# Patient Record
Sex: FEMALE | Race: WHITE | Employment: FULL TIME | URBAN - METROPOLITAN AREA
[De-identification: names, ages, dates, MRNs, and addresses within clinical notes are randomized per-mention and may not be internally consistent; named-entity substitution may affect disease eponyms.]

---

## 2017-06-06 ENCOUNTER — OFFICE VISIT (OUTPATIENT)
Dept: INTERNAL MEDICINE CLINIC | Facility: CLINIC | Age: 42
End: 2017-06-06

## 2017-06-06 VITALS
HEART RATE: 100 BPM | SYSTOLIC BLOOD PRESSURE: 144 MMHG | DIASTOLIC BLOOD PRESSURE: 98 MMHG | BODY MASS INDEX: 44.3 KG/M2 | OXYGEN SATURATION: 98 % | HEIGHT: 63 IN | WEIGHT: 250 LBS

## 2017-06-06 DIAGNOSIS — Z00.00 ANNUAL PHYSICAL EXAM: Primary | ICD-10-CM

## 2017-06-06 DIAGNOSIS — Z72.0 TOBACCO ABUSE: ICD-10-CM

## 2017-06-06 DIAGNOSIS — E66.01 MORBID OBESITY, UNSPECIFIED OBESITY TYPE (HCC): ICD-10-CM

## 2017-06-06 DIAGNOSIS — I10 ESSENTIAL HYPERTENSION: ICD-10-CM

## 2017-06-06 DIAGNOSIS — L83 ACANTHOSIS NIGRICANS, ACQUIRED: ICD-10-CM

## 2017-06-06 PROCEDURE — 99386 PREV VISIT NEW AGE 40-64: CPT | Performed by: FAMILY MEDICINE

## 2017-06-06 RX ORDER — LISINOPRIL AND HYDROCHLOROTHIAZIDE 25; 20 MG/1; MG/1
1 TABLET ORAL DAILY
Qty: 90 TABLET | Refills: 3 | Status: SHIPPED | OUTPATIENT
Start: 2017-06-06 | End: 2017-10-06

## 2017-06-06 RX ORDER — BUPROPION HYDROCHLORIDE 100 MG/1
100 TABLET, EXTENDED RELEASE ORAL DAILY
Qty: 90 TABLET | Refills: 1 | Status: ON HOLD | OUTPATIENT
Start: 2017-06-06 | End: 2018-01-29

## 2017-06-06 NOTE — PROGRESS NOTES
HPI:   Ulisses Burton is a 40year old female who presents for a complete physical exam.   Last pap:  2015-->NORMAL  Last mammogram:  10/16  Menses:  NONE (ON MIRENA IUD)  Family hx of breast, ovarian, cervical or colon CA:  NO    PMHX:  HTN  SOCIAL:  MAR developed, OBESE,in no apparent distress  SKIN: no rashes,no suspicious lesions  HEENT: atraumatic, normocephalic,throat are clear; dentition good  EYES:PERRLA, EOMI,conjunctiva are clear  NECK: supple,no adenopathy  LUNGS: clear to auscultation  CARDIO: R

## 2017-06-30 ENCOUNTER — NURSE ONLY (OUTPATIENT)
Dept: INTERNAL MEDICINE CLINIC | Facility: CLINIC | Age: 42
End: 2017-06-30

## 2017-06-30 PROCEDURE — 80053 COMPREHEN METABOLIC PANEL: CPT | Performed by: FAMILY MEDICINE

## 2017-06-30 PROCEDURE — 36415 COLL VENOUS BLD VENIPUNCTURE: CPT | Performed by: FAMILY MEDICINE

## 2017-06-30 PROCEDURE — 80061 LIPID PANEL: CPT | Performed by: FAMILY MEDICINE

## 2017-06-30 PROCEDURE — 84443 ASSAY THYROID STIM HORMONE: CPT | Performed by: FAMILY MEDICINE

## 2017-07-08 RX ORDER — HYDROCHLOROTHIAZIDE 25 MG/1
TABLET ORAL
Qty: 30 TABLET | Refills: 5 | OUTPATIENT
Start: 2017-07-08

## 2017-07-08 NOTE — TELEPHONE ENCOUNTER
Lisinopril-Hydrochlorothiazide 20-25 MG Oral Tab 90 tablet 3 6/6/2017     Sig - Route:  Take 1 tablet by mouth daily. - Oral    E-Prescribing Status: Receipt confirmed by pharmacy (6/6/2017  2:43 PM CDT)    Print Trail   Printed On Printed By Printed To Rep

## 2017-07-10 ENCOUNTER — OFFICE VISIT (OUTPATIENT)
Dept: INTERNAL MEDICINE CLINIC | Facility: CLINIC | Age: 42
End: 2017-07-10

## 2017-07-10 VITALS
TEMPERATURE: 99 F | BODY MASS INDEX: 44.3 KG/M2 | HEART RATE: 103 BPM | OXYGEN SATURATION: 98 % | WEIGHT: 250 LBS | HEIGHT: 63 IN | SYSTOLIC BLOOD PRESSURE: 128 MMHG | DIASTOLIC BLOOD PRESSURE: 94 MMHG
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
## 2017-07-10 PROCEDURE — 99212 OFFICE O/P EST SF 10 MIN: CPT | Performed by: FAMILY MEDICINE

## 2017-07-10 RX ORDER — DOXYCYCLINE HYCLATE 100 MG/1
100 CAPSULE ORAL 2 TIMES DAILY
Qty: 10 CAPSULE | Refills: 0 | Status: ON HOLD | OUTPATIENT
Start: 2017-07-10 | End: 2018-01-29

## 2017-07-10 NOTE — PROGRESS NOTES
CC:  Bite Sting,Insect (integumentary) (Pt presents to clinic for c/o possible bug bite on upper back by left shoulder-->causing redness and itchiness.  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

## 2017-10-05 RX ORDER — LISINOPRIL AND HYDROCHLOROTHIAZIDE 25; 20 MG/1; MG/1
1 TABLET ORAL DAILY
Qty: 90 TABLET | Refills: 3 | Status: CANCELLED
Start: 2017-10-05

## 2017-10-06 DIAGNOSIS — I10 ESSENTIAL HYPERTENSION: Primary | ICD-10-CM

## 2017-10-06 RX ORDER — LISINOPRIL AND HYDROCHLOROTHIAZIDE 25; 20 MG/1; MG/1
1 TABLET ORAL DAILY
Qty: 90 TABLET | Refills: 2 | Status: SHIPPED | OUTPATIENT
Start: 2017-10-06 | End: 2018-01-30

## 2018-01-29 ENCOUNTER — APPOINTMENT (OUTPATIENT)
Dept: CT IMAGING | Facility: HOSPITAL | Age: 43
DRG: 394 | End: 2018-01-29
Attending: EMERGENCY MEDICINE
Payer: COMMERCIAL

## 2018-01-29 ENCOUNTER — HOSPITAL ENCOUNTER (INPATIENT)
Facility: HOSPITAL | Age: 43
LOS: 1 days | Discharge: HOME | DRG: 394 | End: 2018-01-30
Attending: EMERGENCY MEDICINE | Admitting: HOSPITALIST
Payer: COMMERCIAL

## 2018-01-29 DIAGNOSIS — K52.9 COLITIS: Primary | ICD-10-CM

## 2018-01-29 DIAGNOSIS — K63.5 POLYP OF ASCENDING COLON: ICD-10-CM

## 2018-01-29 DIAGNOSIS — K63.5 POLYP OF SIGMOID COLON: ICD-10-CM

## 2018-01-29 LAB
ADENOVIRUS F 40/41 PCR: NEGATIVE
ANION GAP SERPL CALC-SCNC: 13 MMOL/L (ref 0–18)
ASTROVIRUS PCR: NEGATIVE
BASOPHILS # BLD: 0 K/UL (ref 0–0.2)
BASOPHILS NFR BLD: 0 %
BILIRUB UR QL: NEGATIVE
BUN SERPL-MCNC: 18 MG/DL (ref 8–20)
BUN/CREAT SERPL: 22.5 (ref 10–20)
C CAYETANENSIS DNA SPEC QL NAA+PROBE: NEGATIVE
C. DIFFICILE TOXIN A/B PCR: NEGATIVE
CALCIUM SERPL-MCNC: 10.9 MG/DL (ref 8.5–10.5)
CAMPY SP DNA.DIARRHEA STL QL NAA+PROBE: NEGATIVE
CHLORIDE SERPL-SCNC: 99 MMOL/L (ref 95–110)
CO2 SERPL-SCNC: 21 MMOL/L (ref 22–32)
COLOR UR: YELLOW
CREAT SERPL-MCNC: 0.8 MG/DL (ref 0.5–1.5)
CRYPTOSP DNA SPEC QL NAA+PROBE: NEGATIVE
EAEC PAA PLAS AGGR+AATA ST NAA+NON-PRB: NEGATIVE
EC STX1+STX2 + H7 FLIC SPEC NAA+PROBE: NEGATIVE
ENTAMOEBA HISTOLYTICA PCR: NEGATIVE
EOSINOPHIL # BLD: 0.1 K/UL (ref 0–0.7)
EOSINOPHIL NFR BLD: 0 %
EPEC EAE GENE STL QL NAA+NON-PROBE: NEGATIVE
ERYTHROCYTE [DISTWIDTH] IN BLOOD BY AUTOMATED COUNT: 13.6 % (ref 11–15)
ETEC LTA+ST1A+ST1B TOX ST NAA+NON-PROBE: NEGATIVE
GIARDIA LAMBLIA PCR: NEGATIVE
GLUCOSE SERPL-MCNC: 110 MG/DL (ref 70–99)
GLUCOSE UR-MCNC: NEGATIVE MG/DL
HCT VFR BLD AUTO: 45 % (ref 35–48)
HGB BLD-MCNC: 14.8 G/DL (ref 12–16)
KETONES UR-MCNC: NEGATIVE MG/DL
LYMPHOCYTES # BLD: 2.3 K/UL (ref 1–4)
LYMPHOCYTES NFR BLD: 15 %
MCH RBC QN AUTO: 29 PG (ref 27–32)
MCHC RBC AUTO-ENTMCNC: 32.9 G/DL (ref 32–37)
MCV RBC AUTO: 88.1 FL (ref 80–100)
MONOCYTES # BLD: 0.9 K/UL (ref 0–1)
MONOCYTES NFR BLD: 6 %
NEUTROPHILS # BLD AUTO: 11.6 K/UL (ref 1.8–7.7)
NEUTROPHILS NFR BLD: 78 %
NITRITE UR QL STRIP.AUTO: NEGATIVE
NOROVIRUS GI/GII PCR: NEGATIVE
OSMOLALITY UR CALC.SUM OF ELEC: 279 MOSM/KG (ref 275–295)
P SHIGELLOIDES DNA STL QL NAA+PROBE: NEGATIVE
PH UR: 5 [PH] (ref 5–8)
PLATELET # BLD AUTO: 354 K/UL (ref 140–400)
PMV BLD AUTO: 8.6 FL (ref 7.4–10.3)
POTASSIUM SERPL-SCNC: 3.9 MMOL/L (ref 3.3–5.1)
PROT UR-MCNC: NEGATIVE MG/DL
RBC # BLD AUTO: 5.11 M/UL (ref 3.7–5.4)
RBC #/AREA URNS AUTO: 5 /HPF
ROTAVIRUS A PCR: NEGATIVE
SALMONELLA DNA SPEC QL NAA+PROBE: NEGATIVE
SAPOVIRUS PCR: NEGATIVE
SHIGELLA SP+EIEC IPAH ST NAA+NON-PROBE: NEGATIVE
SODIUM SERPL-SCNC: 133 MMOL/L (ref 136–144)
SP GR UR STRIP: 1.01 (ref 1–1.03)
UROBILINOGEN UR STRIP-ACNC: <2
V CHOLERAE DNA SPEC QL NAA+PROBE: NEGATIVE
VIBRIO DNA SPEC NAA+PROBE: NEGATIVE
VIT C UR-MCNC: NEGATIVE MG/DL
WBC # BLD AUTO: 14.8 K/UL (ref 4–11)
WBC #/AREA URNS AUTO: 6 /HPF
YERSINIA DNA SPEC NAA+PROBE: NEGATIVE

## 2018-01-29 PROCEDURE — 99222 1ST HOSP IP/OBS MODERATE 55: CPT | Performed by: HOSPITALIST

## 2018-01-29 PROCEDURE — 74177 CT ABD & PELVIS W/CONTRAST: CPT | Performed by: EMERGENCY MEDICINE

## 2018-01-29 PROCEDURE — 99254 IP/OBS CNSLTJ NEW/EST MOD 60: CPT | Performed by: INTERNAL MEDICINE

## 2018-01-29 RX ORDER — ACETAMINOPHEN 325 MG/1
650 TABLET ORAL EVERY 6 HOURS PRN
Status: DISCONTINUED | OUTPATIENT
Start: 2018-01-29 | End: 2018-01-30

## 2018-01-29 RX ORDER — SODIUM CHLORIDE 0.9 % (FLUSH) 0.9 %
3 SYRINGE (ML) INJECTION AS NEEDED
Status: DISCONTINUED | OUTPATIENT
Start: 2018-01-29 | End: 2018-01-30

## 2018-01-29 RX ORDER — BISACODYL 10 MG
10 SUPPOSITORY, RECTAL RECTAL
Status: DISCONTINUED | OUTPATIENT
Start: 2018-01-29 | End: 2018-01-30

## 2018-01-29 RX ORDER — LISINOPRIL AND HYDROCHLOROTHIAZIDE 25; 20 MG/1; MG/1
1 TABLET ORAL DAILY
Status: DISCONTINUED | OUTPATIENT
Start: 2018-01-29 | End: 2018-01-29 | Stop reason: RX

## 2018-01-29 RX ORDER — ZOLPIDEM TARTRATE 5 MG/1
5 TABLET ORAL NIGHTLY PRN
Status: DISCONTINUED | OUTPATIENT
Start: 2018-01-29 | End: 2018-01-30

## 2018-01-29 RX ORDER — ONDANSETRON 2 MG/ML
4 INJECTION INTRAMUSCULAR; INTRAVENOUS ONCE
Status: COMPLETED | OUTPATIENT
Start: 2018-01-29 | End: 2018-01-29

## 2018-01-29 RX ORDER — HYDROCODONE BITARTRATE AND ACETAMINOPHEN 5; 325 MG/1; MG/1
2 TABLET ORAL EVERY 4 HOURS PRN
Status: DISCONTINUED | OUTPATIENT
Start: 2018-01-29 | End: 2018-01-30

## 2018-01-29 RX ORDER — ONDANSETRON 2 MG/ML
4 INJECTION INTRAMUSCULAR; INTRAVENOUS EVERY 6 HOURS PRN
Status: DISCONTINUED | OUTPATIENT
Start: 2018-01-29 | End: 2018-01-30

## 2018-01-29 RX ORDER — POLYETHYLENE GLYCOL 3350 17 G/17G
17 POWDER, FOR SOLUTION ORAL DAILY PRN
Status: DISCONTINUED | OUTPATIENT
Start: 2018-01-29 | End: 2018-01-30

## 2018-01-29 RX ORDER — MORPHINE SULFATE 2 MG/ML
1 INJECTION, SOLUTION INTRAMUSCULAR; INTRAVENOUS EVERY 2 HOUR PRN
Status: DISCONTINUED | OUTPATIENT
Start: 2018-01-29 | End: 2018-01-30

## 2018-01-29 RX ORDER — SODIUM CHLORIDE 9 MG/ML
INJECTION, SOLUTION INTRAVENOUS CONTINUOUS
Status: DISCONTINUED | OUTPATIENT
Start: 2018-01-29 | End: 2018-01-30

## 2018-01-29 RX ORDER — ACETAMINOPHEN 325 MG/1
650 TABLET ORAL EVERY 4 HOURS PRN
Status: DISCONTINUED | OUTPATIENT
Start: 2018-01-29 | End: 2018-01-30

## 2018-01-29 RX ORDER — MORPHINE SULFATE 4 MG/ML
4 INJECTION, SOLUTION INTRAMUSCULAR; INTRAVENOUS EVERY 2 HOUR PRN
Status: DISCONTINUED | OUTPATIENT
Start: 2018-01-29 | End: 2018-01-30

## 2018-01-29 RX ORDER — HEPARIN SODIUM 5000 [USP'U]/ML
5000 INJECTION, SOLUTION INTRAVENOUS; SUBCUTANEOUS EVERY 12 HOURS SCHEDULED
Status: DISCONTINUED | OUTPATIENT
Start: 2018-01-29 | End: 2018-01-30

## 2018-01-29 RX ORDER — BUPROPION HYDROCHLORIDE 100 MG/1
100 TABLET, EXTENDED RELEASE ORAL DAILY
Status: DISCONTINUED | OUTPATIENT
Start: 2018-01-29 | End: 2018-01-29

## 2018-01-29 RX ORDER — DOCUSATE SODIUM 100 MG/1
100 CAPSULE, LIQUID FILLED ORAL 2 TIMES DAILY PRN
Status: DISCONTINUED | OUTPATIENT
Start: 2018-01-29 | End: 2018-01-30

## 2018-01-29 RX ORDER — MORPHINE SULFATE 2 MG/ML
2 INJECTION, SOLUTION INTRAMUSCULAR; INTRAVENOUS EVERY 2 HOUR PRN
Status: DISCONTINUED | OUTPATIENT
Start: 2018-01-29 | End: 2018-01-30

## 2018-01-29 RX ORDER — HYDROCODONE BITARTRATE AND ACETAMINOPHEN 5; 325 MG/1; MG/1
1 TABLET ORAL EVERY 4 HOURS PRN
Status: DISCONTINUED | OUTPATIENT
Start: 2018-01-29 | End: 2018-01-30

## 2018-01-29 NOTE — CONSULTS
[facility]   Gastroenterology Consultation Note    Kvng Galvez  Patient Status:    Emergency  Date of Admission:         1/29/2018, Hospital day #0  Attending:   No att. providers found  PCP:     Danielle De Souza MD    Reason for [facility] ill. She endorses episodes of cramping previously, however no blood in her BMs. No hematemesis, dysphagia, odynophagia, heartburn/dyspepsia or epigastric pain. The lower abdominal cramping did radiate to the flanks yesterday.  Endorses feeling warm, no feve Systems:  CONSTITUTIONAL:  +subjective fevers yesterday   EYES:  negative for diplopia or change in vision   RESPIRATORY:  negative for severe shortness of breath  CARDIOVASCULAR:  negative for crushing sub-sternal chest pain  GASTROINTESTINAL:  see HPI  G nonspecific colitis of infectious or inflammatory etiology. 2.  Post cholecystectomy. No biliary ductal dilatation. 3.  13 x 16 mm left adrenal nodule, which could represent an adenoma however it is not well characterized due to imaging technique.  4.  Sat Colonoscopy consent: I have discussed the risks, benefits, and alternatives to colonoscopy with the patient [who demonstrated understanding], including but not limited to the risks of bleeding, infection, pain, death, as well as the risks of anesthesia discontinue the antihypertensive medication.

## 2018-01-29 NOTE — H&P
[facility]    PATIENT'S NAME: Georgie Lozano   ATTENDING PHYSICIAN: Patel Bullock MD   PATIENT ACCOUNT#:   [de-identified]    LOCATION:  [place] [address] #:   [id number]       YOB: 1975  ADMISSION DATE:       01/29/2018 HISTORY:  Father is 77 and has MS. Mother is 58 and well. She has a sister who had Hodgkin lymphoma at age 25. Maternal grandmother who had a breast cancer at age 48. SOCIAL HISTORY:  She smokes half-pack a day and has a 10-pack-year smoking history. creatinine 0.8. Abdomen and pelvis CT shows descending colon, thickening colitis. Await scope and other recommendations. ASSESSMENT AND PLAN:    1. Biliary duct dilatation. 2.   Adrenal nodule, may need to be rechecked.   3.   Implanted intrauteri

## 2018-01-29 NOTE — ED PROVIDER NOTES
Patient Seen in: [facility] AND [facility] Emergency Department    History   Patient presents with:  Rectal Bleeding    Stated Complaint: rectal bleed    HPI    18-year-old female with history of hypertension and IBS presents with complaints of abdominal pain an injection  ENT, Mouth: mucous membranes moist  Respiratory: there are no retractions, lungs are clear to auscultation  Cardiovascular: regular rate and rhythm  Gastrointestinal:  abdomen is soft with some tenderness to the epigastric area and left lower qu aaron Daniel.      Admission disposition: 1/29/2018 11:10 AM           Disposition and Plan     Clinical Impression:  Colitis  (primary encounter diagnosis)    Disposition:  Admit  1/29/2018 11:10 am    Follow-up:  No follow-up provider specified.

## 2018-01-29 NOTE — ED INITIAL ASSESSMENT (HPI)
Pt reports \"bright red\" bleeding from rectum when attempting to have a bm. Bleeding onset yesterday. Had one episode of diarrhea last night. Also has intermittent lower abd cramping.

## 2018-01-30 ENCOUNTER — ANESTHESIA EVENT (OUTPATIENT)
Dept: ENDOSCOPY | Facility: HOSPITAL | Age: 43
DRG: 394 | End: 2018-01-30
Payer: COMMERCIAL

## 2018-01-30 ENCOUNTER — ANESTHESIA (OUTPATIENT)
Dept: ENDOSCOPY | Facility: HOSPITAL | Age: 43
DRG: 394 | End: 2018-01-30
Payer: COMMERCIAL

## 2018-01-30 ENCOUNTER — SURGERY (OUTPATIENT)
Age: 43
End: 2018-01-30

## 2018-01-30 VITALS
TEMPERATURE: 98 F | SYSTOLIC BLOOD PRESSURE: 125 MMHG | DIASTOLIC BLOOD PRESSURE: 56 MMHG | WEIGHT: 241.63 LBS | BODY MASS INDEX: 42.81 KG/M2 | HEIGHT: 63 IN | OXYGEN SATURATION: 98 % | RESPIRATION RATE: 16 BRPM | HEART RATE: 58 BPM

## 2018-01-30 LAB
ALBUMIN SERPL BCP-MCNC: 3.5 G/DL (ref 3.5–4.8)
ALBUMIN/GLOB SERPL: 1.5 {RATIO} (ref 1–2)
ALP SERPL-CCNC: 68 U/L (ref 32–100)
ALT SERPL-CCNC: 25 U/L (ref 14–54)
ANION GAP SERPL CALC-SCNC: 7 MMOL/L (ref 0–18)
AST SERPL-CCNC: 22 U/L (ref 15–41)
BASOPHILS # BLD: 0 K/UL (ref 0–0.2)
BASOPHILS NFR BLD: 0 %
BILIRUB SERPL-MCNC: 0.9 MG/DL (ref 0.3–1.2)
BUN SERPL-MCNC: 11 MG/DL (ref 8–20)
BUN/CREAT SERPL: 16.2 (ref 10–20)
CALCIUM SERPL-MCNC: 9.3 MG/DL (ref 8.5–10.5)
CHLORIDE SERPL-SCNC: 105 MMOL/L (ref 95–110)
CHOLEST SERPL-MCNC: 118 MG/DL (ref 110–200)
CO2 SERPL-SCNC: 23 MMOL/L (ref 22–32)
CREAT SERPL-MCNC: 0.68 MG/DL (ref 0.5–1.5)
EOSINOPHIL # BLD: 0.1 K/UL (ref 0–0.7)
EOSINOPHIL NFR BLD: 1 %
ERYTHROCYTE [DISTWIDTH] IN BLOOD BY AUTOMATED COUNT: 14 % (ref 11–15)
GLOBULIN PLAS-MCNC: 2.3 G/DL (ref 2.5–3.7)
GLUCOSE SERPL-MCNC: 87 MG/DL (ref 70–99)
HCT VFR BLD AUTO: 39.5 % (ref 35–48)
HDLC SERPL-MCNC: 27 MG/DL
HGB BLD-MCNC: 13.3 G/DL (ref 12–16)
LDLC SERPL CALC-MCNC: 67 MG/DL (ref 0–99)
LYMPHOCYTES # BLD: 3.8 K/UL (ref 1–4)
LYMPHOCYTES NFR BLD: 35 %
MAGNESIUM SERPL-MCNC: 1.7 MG/DL (ref 1.8–2.5)
MCH RBC QN AUTO: 29.5 PG (ref 27–32)
MCHC RBC AUTO-ENTMCNC: 33.6 G/DL (ref 32–37)
MCV RBC AUTO: 87.8 FL (ref 80–100)
MONOCYTES # BLD: 0.7 K/UL (ref 0–1)
MONOCYTES NFR BLD: 6 %
NEUTROPHILS # BLD AUTO: 6.3 K/UL (ref 1.8–7.7)
NEUTROPHILS NFR BLD: 57 %
NONHDLC SERPL-MCNC: 91 MG/DL
OSMOLALITY UR CALC.SUM OF ELEC: 279 MOSM/KG (ref 275–295)
PHOSPHATE SERPL-MCNC: 2.7 MG/DL (ref 2.4–4.7)
PLATELET # BLD AUTO: 272 K/UL (ref 140–400)
PMV BLD AUTO: 8.4 FL (ref 7.4–10.3)
POTASSIUM SERPL-SCNC: 3.2 MMOL/L (ref 3.3–5.1)
PROT SERPL-MCNC: 5.8 G/DL (ref 5.9–8.4)
RBC # BLD AUTO: 4.5 M/UL (ref 3.7–5.4)
SODIUM SERPL-SCNC: 135 MMOL/L (ref 136–144)
TRIGL SERPL-MCNC: 121 MG/DL (ref 1–149)
TSH SERPL-ACNC: 2.08 UIU/ML (ref 0.45–5.33)
WBC # BLD AUTO: 10.9 K/UL (ref 4–11)

## 2018-01-30 PROCEDURE — 0DBN8ZZ EXCISION OF SIGMOID COLON, VIA NATURAL OR ARTIFICIAL OPENING ENDOSCOPIC: ICD-10-PCS | Performed by: INTERNAL MEDICINE

## 2018-01-30 PROCEDURE — 0DBM8ZX EXCISION OF DESCENDING COLON, VIA NATURAL OR ARTIFICIAL OPENING ENDOSCOPIC, DIAGNOSTIC: ICD-10-PCS | Performed by: INTERNAL MEDICINE

## 2018-01-30 PROCEDURE — 99239 HOSP IP/OBS DSCHRG MGMT >30: CPT | Performed by: HOSPITALIST

## 2018-01-30 PROCEDURE — 0DBK8ZZ EXCISION OF ASCENDING COLON, VIA NATURAL OR ARTIFICIAL OPENING ENDOSCOPIC: ICD-10-PCS | Performed by: INTERNAL MEDICINE

## 2018-01-30 PROCEDURE — 45385 COLONOSCOPY W/LESION REMOVAL: CPT | Performed by: INTERNAL MEDICINE

## 2018-01-30 RX ORDER — NALOXONE HYDROCHLORIDE 0.4 MG/ML
80 INJECTION, SOLUTION INTRAMUSCULAR; INTRAVENOUS; SUBCUTANEOUS AS NEEDED
Status: DISCONTINUED | OUTPATIENT
Start: 2018-01-30 | End: 2018-01-30 | Stop reason: HOSPADM

## 2018-01-30 RX ORDER — LIDOCAINE HYDROCHLORIDE 10 MG/ML
INJECTION, SOLUTION EPIDURAL; INFILTRATION; INTRACAUDAL; PERINEURAL AS NEEDED
Status: DISCONTINUED | OUTPATIENT
Start: 2018-01-30 | End: 2018-01-30 | Stop reason: SURG

## 2018-01-30 RX ORDER — SODIUM CHLORIDE, SODIUM LACTATE, POTASSIUM CHLORIDE, CALCIUM CHLORIDE 600; 310; 30; 20 MG/100ML; MG/100ML; MG/100ML; MG/100ML
INJECTION, SOLUTION INTRAVENOUS CONTINUOUS
Status: DISCONTINUED | OUTPATIENT
Start: 2018-01-30 | End: 2018-01-30

## 2018-01-30 RX ORDER — MIDAZOLAM HYDROCHLORIDE 1 MG/ML
INJECTION INTRAMUSCULAR; INTRAVENOUS AS NEEDED
Status: DISCONTINUED | OUTPATIENT
Start: 2018-01-30 | End: 2018-01-30 | Stop reason: SURG

## 2018-01-30 RX ORDER — PSEUDOEPHEDRINE HCL 30 MG
100 TABLET ORAL 2 TIMES DAILY PRN
Qty: 20 CAPSULE | Refills: 0 | Status: SHIPPED | OUTPATIENT
Start: 2018-01-30 | End: 2018-02-06

## 2018-01-30 RX ORDER — MAGNESIUM OXIDE 400 MG (241.3 MG MAGNESIUM) TABLET
400 TABLET ONCE
Status: COMPLETED | OUTPATIENT
Start: 2018-01-30 | End: 2018-01-30

## 2018-01-30 RX ORDER — POTASSIUM CHLORIDE 20 MEQ/1
20 TABLET, EXTENDED RELEASE ORAL EVERY 4 HOURS
Status: DISCONTINUED | OUTPATIENT
Start: 2018-01-30 | End: 2018-01-30

## 2018-01-30 RX ADMIN — SODIUM CHLORIDE: 9 INJECTION, SOLUTION INTRAVENOUS at 12:55:00

## 2018-01-30 RX ADMIN — LIDOCAINE HYDROCHLORIDE 50 MG: 10 INJECTION, SOLUTION EPIDURAL; INFILTRATION; INTRACAUDAL; PERINEURAL at 13:03:00

## 2018-01-30 RX ADMIN — SODIUM CHLORIDE: 9 INJECTION, SOLUTION INTRAVENOUS at 13:33:00

## 2018-01-30 RX ADMIN — MIDAZOLAM HYDROCHLORIDE 2 MG: 1 INJECTION INTRAMUSCULAR; INTRAVENOUS at 12:57:00

## 2018-01-30 NOTE — DISCHARGE SUMMARY
More than 30 min spent on dc  Dc summary # [id number]  Hospital Discharge Diagnoses: colitis poss ischemic    Lace+ Score: 16  59-90 High Risk  29-58 Medium Risk  0-28   Low Risk. TCM Follow-Up Recommendation:  LACE 29-58:  Moderate Risk of readmission aft

## 2018-01-30 NOTE — ANESTHESIA PREPROCEDURE EVALUATION
Anesthesia PreOp Note    HPI:     Simba Shen is a 40year old female who presents for preoperative consultation requested by: Sierra Galvan MD    Date of Surgery: 1/29/2018 - 1/30/2018    Procedure(s):  COLONOSCOPY  Indication: Abdominal P (NORCO) 5-325 MG per tab 1 tablet 1 tablet Oral Q4H PRN Delmy Angel MD    Or        HYDROcodone-acetaminophen (NORCO) 5-325 MG per tab 2 tablet 2 tablet Oral Q4H PRN Delmy Angel MD    morphINE sulfate (PF) 2 MG/ML injection 1 mg 1 Narrative   None on file       Available pre-op labs reviewed.     Lab Results  Component Value Date   WBC 10.9 01/30/2018   RBC 4.50 01/30/2018   HGB 13.3 01/30/2018   HCT 39.5 01/30/2018   MCV 87.8 01/30/2018   MCH 29.5 01/30/2018   MCHC 33.6 01/30/2018 management. All of the patient's questions were answered to the best of my ability. The patient desires the anesthetic management as planned.   Pritesh Montiel  1/30/2018 11:45 AM

## 2018-01-30 NOTE — PLAN OF CARE
Patient/Family Goals    • Patient/Family Long Term Goal Completed    • Patient/Family Short Term Goal Completed        Pt will be D/C home today with her father who is at the bedside. Pt's belongings are at the bedside.  Pt was explained all D/C instruction

## 2018-01-30 NOTE — OPERATIVE REPORT
COLONOSCOPY PROCEDURE REPORT     DATE OF PROCEDURE:  1/30/2018     PCP: Dari Jenkins MD     PREOPERATIVE DIAGNOSIS: Acute abdominal pain, change in bowel patterns, blood in stool, abnormal CT scan of the colon. POSTOPERATIVE DIAGNOSIS:  See impression. hemorrhoids    RECOMMENDATIONS:  · Follow-up above colitis biopsy results. Suspect ischemic colitis as per admitting consultation.   · Follow-up above colon polyp pathology results  · Repeat colonoscopy examination in 3 years or as per colon polyp patholog

## 2018-01-30 NOTE — ANESTHESIA POSTPROCEDURE EVALUATION
Patient: Cullen Avila    Procedure Summary     Date:  01/30/18 Room / Location:  [facility] ENDOSCOPY 01 / [facility] ENDOSCOPY    Anesthesia Start:  6813 Anesthesia Stop:  7332    Procedure:  COLONOSCOPY (N/A ) Diagnosis:  (polyps, colitis)    Surgeon:  Abran Bowser

## 2018-01-31 ENCOUNTER — TELEPHONE (OUTPATIENT)
Dept: INTERNAL MEDICINE UNIT | Facility: HOSPITAL | Age: 43
End: 2018-01-31

## 2018-01-31 LAB — PTH-INTACT SERPL-MCNC: 43.5 PG/ML (ref 12–88)

## 2018-01-31 NOTE — TELEPHONE ENCOUNTER
Phoned to schedule Hospital Follow Up. Patient states her PCP is Dr. Octavio Galvan not Dr. Evi Rader.

## 2018-02-01 LAB — B-HCG UR QL: NEGATIVE

## 2018-02-01 NOTE — DISCHARGE SUMMARY
[facility]    PATIENT'S NAME: Liana Adam   ATTENDING PHYSICIAN: Tanya Angel MD   PATIENT ACCOUNT#:   [id number]    LOCATION:  [address] [place] [address] #:   [id number]       YOB: 1975  ADMISSION DATE:       01/29 Adrenal nodule. Will need to be rechecked with a scan. 6.   Hypercalcemia. Await PTH, but unfortunately is seems that TSH was done instead. Endocrine workup as an outpatient. CONDITION UPON DISCHARGE:  Stable. CODE STATUS:  FULL CODE.     DIET:

## 2018-02-02 ENCOUNTER — OFFICE VISIT (OUTPATIENT)
Dept: INTERNAL MEDICINE CLINIC | Facility: CLINIC | Age: 43
End: 2018-02-02

## 2018-02-02 VITALS
HEART RATE: 82 BPM | BODY MASS INDEX: 41.64 KG/M2 | SYSTOLIC BLOOD PRESSURE: 118 MMHG | RESPIRATION RATE: 17 BRPM | DIASTOLIC BLOOD PRESSURE: 90 MMHG | OXYGEN SATURATION: 100 % | WEIGHT: 235 LBS | HEIGHT: 63 IN

## 2018-02-02 DIAGNOSIS — E27.8 ADRENAL NODULE (HCC): ICD-10-CM

## 2018-02-02 DIAGNOSIS — I95.2 HYPOTENSION DUE TO DRUGS: ICD-10-CM

## 2018-02-02 DIAGNOSIS — Z09 EXAMINATION, FOLLOW UP: ICD-10-CM

## 2018-02-02 DIAGNOSIS — D12.6 ADENOMATOUS POLYP OF COLON, UNSPECIFIED PART OF COLON: Primary | ICD-10-CM

## 2018-02-02 PROCEDURE — 99213 OFFICE O/P EST LOW 20 MIN: CPT | Performed by: FAMILY MEDICINE

## 2018-02-03 NOTE — PROGRESS NOTES
CC:  Hospital F/U (Pt presents to clinic for hospital f/up to hospital visit for colitis-->still feeling week. )      Hx of CC:  S/P LIGHTHEADEDNESS AND ABDOMINAL PAIN/RECTAL BLEEDING-->SEEN AT ER & HOSPITALIZED. HAD LOW BP'S AND BP MEDICINE STOPPED.   ALS

## 2018-02-06 ENCOUNTER — OFFICE VISIT (OUTPATIENT)
Dept: OBGYN CLINIC | Facility: CLINIC | Age: 43
End: 2018-02-06

## 2018-02-06 VITALS
BODY MASS INDEX: 41.5 KG/M2 | DIASTOLIC BLOOD PRESSURE: 92 MMHG | HEIGHT: 63 IN | SYSTOLIC BLOOD PRESSURE: 136 MMHG | WEIGHT: 234.19 LBS | HEART RATE: 92 BPM

## 2018-02-06 DIAGNOSIS — Z12.31 ENCOUNTER FOR SCREENING MAMMOGRAM FOR BREAST CANCER: ICD-10-CM

## 2018-02-06 DIAGNOSIS — Z01.419 ENCOUNTER FOR GYNECOLOGICAL EXAMINATION: Primary | ICD-10-CM

## 2018-02-06 PROCEDURE — 99396 PREV VISIT EST AGE 40-64: CPT | Performed by: OBSTETRICS & GYNECOLOGY

## 2018-02-06 RX ORDER — LISINOPRIL AND HYDROCHLOROTHIAZIDE 25; 20 MG/1; MG/1
1 TABLET ORAL
Refills: 3 | COMMUNITY
Start: 2017-12-04 | End: 2018-02-06

## 2018-02-07 NOTE — PROGRESS NOTES
León Winters is a 40year old female [id number] Patient's last menstrual period was 01/14/2018. here for annual exam.       Last seen 9/6/16. Last pap 9/2015 normal with neg HPV. Had 2nd Mirena IUD placed by YVETTE in 3/2013. No periods.   However had pe blurred or double vision  Cardiovascular:  denies chest pain or palpitations  Respiratory:  denies shortness of breath  Gastrointestinal:  denies heartburn, abdominal pain, diarrhea or constipation  Genitourinary:  denies dysuria, incontinence, abnormal va reviewed. Encouraged annual exam.   Order for mammogram.   Pt will make appt for IUD removal/reinsertion. RTC 1 year or prn    2.  Encounter for screening mammogram for breast cancer        No prescriptions requested or ordered in this encounter

## 2018-02-10 ENCOUNTER — HOSPITAL ENCOUNTER (OUTPATIENT)
Dept: CT IMAGING | Facility: HOSPITAL | Age: 43
Discharge: HOME | End: 2018-02-10
Attending: FAMILY MEDICINE
Payer: COMMERCIAL

## 2018-02-10 DIAGNOSIS — E27.8 ADRENAL NODULE (HCC): ICD-10-CM

## 2018-02-10 PROCEDURE — 74176 CT ABD & PELVIS W/O CONTRAST: CPT | Performed by: FAMILY MEDICINE

## 2018-02-15 ENCOUNTER — TELEPHONE (OUTPATIENT)
Dept: GASTROENTEROLOGY | Facility: CLINIC | Age: 43
End: 2018-02-15

## 2018-02-15 NOTE — TELEPHONE ENCOUNTER
Message   Received: Today   Message Contents   Ghada Dykes MD  [facility] Clinical Staff             GI RNs - 1.  Please print and mail this letter to patient; 2. Recall for colonoscopy exam in 3 years      Results letter mailed to patient and co

## 2018-03-03 ENCOUNTER — HOSPITAL ENCOUNTER (OUTPATIENT)
Dept: MAMMOGRAPHY | Facility: HOSPITAL | Age: 43
Discharge: HOME | End: 2018-03-03
Attending: OBSTETRICS & GYNECOLOGY
Payer: COMMERCIAL

## 2018-03-03 DIAGNOSIS — Z12.31 ENCOUNTER FOR SCREENING MAMMOGRAM FOR BREAST CANCER: ICD-10-CM

## 2018-03-03 PROCEDURE — 77067 SCR MAMMO BI INCL CAD: CPT | Performed by: OBSTETRICS & GYNECOLOGY

## 2018-03-06 ENCOUNTER — OFFICE VISIT (OUTPATIENT)
Dept: OBGYN CLINIC | Facility: CLINIC | Age: 43
End: 2018-03-06

## 2018-03-06 VITALS
WEIGHT: 230 LBS | DIASTOLIC BLOOD PRESSURE: 85 MMHG | HEART RATE: 101 BPM | BODY MASS INDEX: 41 KG/M2 | SYSTOLIC BLOOD PRESSURE: 139 MMHG

## 2018-03-06 DIAGNOSIS — Z30.433 ENCOUNTER FOR REMOVAL AND REINSERTION OF INTRAUTERINE CONTRACEPTIVE DEVICE: Primary | ICD-10-CM

## 2018-03-06 PROCEDURE — 58301 REMOVE INTRAUTERINE DEVICE: CPT | Performed by: OBSTETRICS & GYNECOLOGY

## 2018-03-06 PROCEDURE — 58300 INSERT INTRAUTERINE DEVICE: CPT | Performed by: OBSTETRICS & GYNECOLOGY

## 2018-03-08 NOTE — PROCEDURES
IUD Removal     Consent signed. Procedure discussed with the patient in detail including indication, risks, benefits, alternatives and complications.     Pelvic Exam Findings:  Lesion description:  IUD strings seen from cervix    Procedure:  Speculum lee

## 2018-05-29 ENCOUNTER — OFFICE VISIT (OUTPATIENT)
Dept: INTERNAL MEDICINE CLINIC | Facility: CLINIC | Age: 43
End: 2018-05-29

## 2018-05-29 VITALS
RESPIRATION RATE: 13 BRPM | TEMPERATURE: 99 F | OXYGEN SATURATION: 98 % | DIASTOLIC BLOOD PRESSURE: 80 MMHG | WEIGHT: 232 LBS | SYSTOLIC BLOOD PRESSURE: 124 MMHG | HEIGHT: 63 IN | HEART RATE: 98 BPM | BODY MASS INDEX: 41.11 KG/M2

## 2018-05-29 DIAGNOSIS — Z72.0 TOBACCO ABUSE: ICD-10-CM

## 2018-05-29 DIAGNOSIS — R60.0 BILATERAL LEG EDEMA: Primary | ICD-10-CM

## 2018-05-29 DIAGNOSIS — I73.9: ICD-10-CM

## 2018-05-29 DIAGNOSIS — E65 LIPOHYPERTROPHY: ICD-10-CM

## 2018-05-29 PROCEDURE — 99213 OFFICE O/P EST LOW 20 MIN: CPT | Performed by: FAMILY MEDICINE

## 2018-05-29 NOTE — PROGRESS NOTES
CC:  Swelling (c/c left leg swelling) and Other (due for pneumonia vaccine and tobacco cessation)      Hx of CC:  H/O CHRONIC ( X YEARS) LEG EDEMA (L > R) WITH LONG CAR TRIPS OR WITH HOT WEATHER.   HAD LEG ULTRASOUND SEVERAL YEARS AGO WHICH WAS NEGATIVE FOR

## 2019-01-20 ENCOUNTER — HOSPITAL ENCOUNTER (EMERGENCY)
Facility: HOSPITAL | Age: 44
Discharge: HOME | End: 2019-01-20
Attending: EMERGENCY MEDICINE
Payer: COMMERCIAL

## 2019-01-20 VITALS
HEIGHT: 63 IN | HEART RATE: 104 BPM | SYSTOLIC BLOOD PRESSURE: 137 MMHG | TEMPERATURE: 98 F | BODY MASS INDEX: 40.75 KG/M2 | RESPIRATION RATE: 18 BRPM | DIASTOLIC BLOOD PRESSURE: 98 MMHG | WEIGHT: 230 LBS | OXYGEN SATURATION: 97 %

## 2019-01-20 DIAGNOSIS — T78.40XA ALLERGIC REACTION, INITIAL ENCOUNTER: Primary | ICD-10-CM

## 2019-01-20 DIAGNOSIS — L50.9 URTICARIA: ICD-10-CM

## 2019-01-20 PROCEDURE — S0028 INJECTION, FAMOTIDINE, 20 MG: HCPCS | Performed by: EMERGENCY MEDICINE

## 2019-01-20 PROCEDURE — 96375 TX/PRO/DX INJ NEW DRUG ADDON: CPT

## 2019-01-20 PROCEDURE — 99284 EMERGENCY DEPT VISIT MOD MDM: CPT

## 2019-01-20 PROCEDURE — 96374 THER/PROPH/DIAG INJ IV PUSH: CPT

## 2019-01-20 RX ORDER — DIPHENHYDRAMINE HYDROCHLORIDE 50 MG/ML
50 INJECTION INTRAMUSCULAR; INTRAVENOUS ONCE
Status: COMPLETED | OUTPATIENT
Start: 2019-01-20 | End: 2019-01-20

## 2019-01-20 RX ORDER — FAMOTIDINE 10 MG/ML
20 INJECTION, SOLUTION INTRAVENOUS ONCE
Status: COMPLETED | OUTPATIENT
Start: 2019-01-20 | End: 2019-01-20

## 2019-01-20 RX ORDER — METHYLPREDNISOLONE SODIUM SUCCINATE 125 MG/2ML
125 INJECTION, POWDER, LYOPHILIZED, FOR SOLUTION INTRAMUSCULAR; INTRAVENOUS ONCE
Status: COMPLETED | OUTPATIENT
Start: 2019-01-20 | End: 2019-01-20

## 2019-01-20 RX ORDER — FAMOTIDINE 20 MG/1
20 TABLET ORAL DAILY
Qty: 7 TABLET | Refills: 0 | Status: SHIPPED | OUTPATIENT
Start: 2019-01-20 | End: 2019-01-27

## 2019-01-20 RX ORDER — DIPHENHYDRAMINE HCL 25 MG
50 CAPSULE ORAL EVERY 6 HOURS PRN
Qty: 40 CAPSULE | Refills: 0 | Status: SHIPPED | OUTPATIENT
Start: 2019-01-20 | End: 2019-01-25

## 2019-01-20 RX ORDER — PREDNISONE 20 MG/1
60 TABLET ORAL DAILY
Qty: 15 TABLET | Refills: 0 | Status: SHIPPED | OUTPATIENT
Start: 2019-01-20 | End: 2019-01-25

## 2019-01-21 NOTE — ED NOTES
Pt comes in w generalized rash to entire body since 1pm. Denies SOB/FLAVIO. Face slightly swollen. Airway patient. Iv started.  MD at bedside

## 2019-01-22 ENCOUNTER — OFFICE VISIT (OUTPATIENT)
Dept: INTERNAL MEDICINE CLINIC | Facility: CLINIC | Age: 44
End: 2019-01-22

## 2019-01-22 VITALS
BODY MASS INDEX: 40.93 KG/M2 | SYSTOLIC BLOOD PRESSURE: 120 MMHG | WEIGHT: 231 LBS | OXYGEN SATURATION: 98 % | RESPIRATION RATE: 17 BRPM | HEIGHT: 63 IN | DIASTOLIC BLOOD PRESSURE: 82 MMHG | HEART RATE: 92 BPM

## 2019-01-22 DIAGNOSIS — L50.9 FULL BODY HIVES: ICD-10-CM

## 2019-01-22 DIAGNOSIS — Z09 EXAMINATION, FOLLOW UP: Primary | ICD-10-CM

## 2019-01-22 DIAGNOSIS — Z78.9 PATIENT ON KETOGENIC DIET: ICD-10-CM

## 2019-01-22 PROCEDURE — 99213 OFFICE O/P EST LOW 20 MIN: CPT | Performed by: FAMILY MEDICINE

## 2019-01-23 NOTE — PROGRESS NOTES
CC:  ER F/U (Pt following up on ER cisit-->diagnosed with rash. Pt wondering if can be \"keto rash\".)      Hx of CC:  SEEN AT ER FOR URTICARIA/HIVES. ON PREDNISONE 60 MG QD, PEPCID AND BENADRYL. IMPROVING.   NO NEW TRIGGERS BUT ON KETOGENIC DIET AND HAS

## 2019-02-15 ENCOUNTER — OFFICE VISIT (OUTPATIENT)
Dept: OBGYN CLINIC | Facility: CLINIC | Age: 44
End: 2019-02-15

## 2019-02-15 VITALS
DIASTOLIC BLOOD PRESSURE: 98 MMHG | HEART RATE: 105 BPM | BODY MASS INDEX: 41.22 KG/M2 | SYSTOLIC BLOOD PRESSURE: 136 MMHG | HEIGHT: 63 IN | WEIGHT: 232.63 LBS

## 2019-02-15 DIAGNOSIS — Z01.419 ENCOUNTER FOR GYNECOLOGICAL EXAMINATION: Primary | ICD-10-CM

## 2019-02-15 DIAGNOSIS — Z12.31 ENCOUNTER FOR SCREENING MAMMOGRAM FOR BREAST CANCER: ICD-10-CM

## 2019-02-15 DIAGNOSIS — Z12.4 SCREENING FOR MALIGNANT NEOPLASM OF CERVIX: ICD-10-CM

## 2019-02-15 PROCEDURE — 99396 PREV VISIT EST AGE 40-64: CPT | Performed by: OBSTETRICS & GYNECOLOGY

## 2019-02-15 NOTE — PROGRESS NOTES
Chilo Coates is a 44year old female [id number] No LMP recorded. Patient is not currently having periods (Reason: IUD - Intrauterine Device). here for annual exam.       Last seen 3/6/18. Had old Mirena IUD removed and new one replaced on 3/2018.   Has occa vision  Cardiovascular:  denies chest pain or palpitations  Respiratory:  denies shortness of breath  Gastrointestinal:  denies heartburn, abdominal pain, diarrhea or constipation  Genitourinary:  denies dysuria, incontinence, abnormal vaginal discharge, v mammogram.  RTC 1 year or prn    2. Encounter for screening mammogram for breast cancer   [facility]ON 2D+3D SCREENING BILAT (CPT=77067/11718);  Future        Requested Prescriptions      No prescriptions requested or ordered in this encounter         Belinda Dailey

## 2019-02-17 LAB — HPV I/H RISK 1 DNA SPEC QL NAA+PROBE: NEGATIVE

## 2019-03-16 ENCOUNTER — HOSPITAL ENCOUNTER (OUTPATIENT)
Dept: MAMMOGRAPHY | Facility: HOSPITAL | Age: 44
Discharge: HOME | End: 2019-03-16
Attending: OBSTETRICS & GYNECOLOGY
Payer: COMMERCIAL

## 2019-03-16 DIAGNOSIS — Z12.31 ENCOUNTER FOR SCREENING MAMMOGRAM FOR BREAST CANCER: ICD-10-CM

## 2019-03-16 PROCEDURE — 77063 BREAST TOMOSYNTHESIS BI: CPT | Performed by: OBSTETRICS & GYNECOLOGY

## 2019-03-16 PROCEDURE — 77067 SCR MAMMO BI INCL CAD: CPT | Performed by: OBSTETRICS & GYNECOLOGY

## 2019-05-07 DIAGNOSIS — I10 ESSENTIAL HYPERTENSION: ICD-10-CM

## 2019-05-08 RX ORDER — LISINOPRIL AND HYDROCHLOROTHIAZIDE 25; 20 MG/1; MG/1
1 TABLET ORAL DAILY
Qty: 90 TABLET | Refills: 0 | OUTPATIENT
Start: 2019-05-08

## 2019-05-08 RX ORDER — LISINOPRIL AND HYDROCHLOROTHIAZIDE 12.5; 1 MG/1; MG/1
1 TABLET ORAL DAILY
Qty: 90 TABLET | Refills: 1 | Status: SHIPPED | OUTPATIENT
Start: 2019-05-08 | End: 2019-10-29

## 2019-05-09 DIAGNOSIS — I10 ESSENTIAL HYPERTENSION: ICD-10-CM

## 2019-05-09 RX ORDER — LISINOPRIL AND HYDROCHLOROTHIAZIDE 25; 20 MG/1; MG/1
1 TABLET ORAL DAILY
Qty: 90 TABLET | Refills: 0 | OUTPATIENT
Start: 2019-05-09

## 2019-05-09 NOTE — TELEPHONE ENCOUNTER
Hypertension Medications Protocol Failed5/9 11:46 AM   CMP or BMP in past 12 months    Last serum creatinine< 2.0    Appointment in past 6 or next 3 months        Last OV 01/22/19  No Future appt

## 2019-10-29 RX ORDER — LISINOPRIL AND HYDROCHLOROTHIAZIDE 12.5; 1 MG/1; MG/1
TABLET ORAL
Qty: 90 TABLET | Refills: 1 | Status: SHIPPED | OUTPATIENT
Start: 2019-10-29 | End: 2020-05-04

## 2020-01-16 NOTE — ED PROVIDER NOTES
Patient Seen in: [facility] AND [facility] Emergency Department    History   Patient presents with:  Hives    Stated Complaint:     HPI    45-year-old female who is healthy who started having urticaria and itching and hives across the body earlier today that is distress. Head: Normocephalic and atraumatic. Eyes: Conjunctivae are normal. Pupils are equal, round, and reactive to light. Neck: Normal range of motion. Neck supple. No stridor. No lymphadenopathy.   Cardiovascular: Normal rate, regular rhythm and (50 mg total) by mouth every 6 (six) hours as needed for Itching or Allergies. Qty: 40 capsule Refills: 0    famoTIDine (PEPCID) 20 MG Oral Tab  Take 1 tablet (20 mg total) by mouth daily for 7 days.   Qty: 7 tablet Refills: 0 Bilateral Helical Rim Advancement Flap Text: The defect edges were debeveled with a #15 blade scalpel.  Given the location of the defect and the proximity to free margins (helical rim) a bilateral helical rim advancement flap was deemed most appropriate.  Using a sterile surgical marker, the appropriate advancement flaps were drawn incorporating the defect and placing the expected incisions between the helical rim and antihelix where possible.  The area thus outlined was incised through and through with a #15 scalpel blade.  With a skin hook and iris scissors, the flaps were gently and sharply undermined and freed up.

## 2020-05-04 RX ORDER — LISINOPRIL AND HYDROCHLOROTHIAZIDE 12.5; 1 MG/1; MG/1
TABLET ORAL
Qty: 90 TABLET | Refills: 1 | Status: SHIPPED | OUTPATIENT
Start: 2020-05-04 | End: 2020-09-29

## 2020-06-22 DIAGNOSIS — D49.2 ABNORMAL SKIN GROWTH: Primary | ICD-10-CM

## 2020-06-23 ENCOUNTER — OFFICE VISIT (OUTPATIENT)
Dept: INTERNAL MEDICINE CLINIC | Facility: CLINIC | Age: 45
End: 2020-06-23

## 2020-06-23 VITALS
HEART RATE: 127 BPM | OXYGEN SATURATION: 98 % | SYSTOLIC BLOOD PRESSURE: 154 MMHG | HEIGHT: 63 IN | DIASTOLIC BLOOD PRESSURE: 118 MMHG | BODY MASS INDEX: 47.66 KG/M2 | WEIGHT: 269 LBS

## 2020-06-23 DIAGNOSIS — Z12.39 BREAST CANCER SCREENING: ICD-10-CM

## 2020-06-23 DIAGNOSIS — R21 RASH AND NONSPECIFIC SKIN ERUPTION: ICD-10-CM

## 2020-06-23 DIAGNOSIS — J45.20 MILD INTERMITTENT ASTHMA WITHOUT COMPLICATION: ICD-10-CM

## 2020-06-23 DIAGNOSIS — R00.0 TACHYCARDIA: ICD-10-CM

## 2020-06-23 DIAGNOSIS — I10 ESSENTIAL HYPERTENSION: Primary | ICD-10-CM

## 2020-06-23 DIAGNOSIS — Z00.00 ANNUAL PHYSICAL EXAM: ICD-10-CM

## 2020-06-23 DIAGNOSIS — K52.9 COLITIS: ICD-10-CM

## 2020-06-23 DIAGNOSIS — E66.01 MORBID OBESITY (HCC): ICD-10-CM

## 2020-06-23 DIAGNOSIS — Z72.0 TOBACCO ABUSE: ICD-10-CM

## 2020-06-23 PROCEDURE — 99214 OFFICE O/P EST MOD 30 MIN: CPT | Performed by: FAMILY MEDICINE

## 2020-06-23 RX ORDER — BUDESONIDE AND FORMOTEROL FUMARATE DIHYDRATE 160; 4.5 UG/1; UG/1
2 AEROSOL RESPIRATORY (INHALATION) 2 TIMES DAILY
Qty: 3 INHALER | Refills: 1 | Status: SHIPPED | OUTPATIENT
Start: 2020-06-23 | End: 2020-11-27

## 2020-06-23 RX ORDER — PEN NEEDLE, DIABETIC 30 GX3/16"
1 NEEDLE, DISPOSABLE MISCELLANEOUS DAILY
Qty: 90 EACH | Refills: 0 | Status: SHIPPED | OUTPATIENT
Start: 2020-06-23 | End: 2020-08-11

## 2020-06-23 RX ORDER — ATENOLOL 50 MG/1
50 TABLET ORAL DAILY
Qty: 90 TABLET | Refills: 1 | Status: SHIPPED | OUTPATIENT
Start: 2020-06-23 | End: 2020-11-30

## 2020-06-24 PROBLEM — J45.20 MILD INTERMITTENT ASTHMA WITHOUT COMPLICATION: Status: ACTIVE | Noted: 2020-06-24

## 2020-06-24 PROBLEM — J45.20 MILD INTERMITTENT ASTHMA WITHOUT COMPLICATION (HCC): Status: ACTIVE | Noted: 2020-06-24

## 2020-06-24 NOTE — PROGRESS NOTES
CC:  Derm Problem (red spots on arm )      Hx of CC:  HAS NOTICED SEVERAL RED SPOTS ON ARMS (FOREARMS) & CONCERNED THEY MAY BE PRE-CANCEROUS. HAS APPT WITH DERMATOLOGY IN 6 WEEKS.     W[facility] NEED LAB[facility] & FORM FILLE Future    4. Tobacco abuse  Encouraged quitting, nicotine replacement to help    5. Colitis  Qualifies for medical marihuana if desired    6. Annual physical exam  CBC  - COMP METABOLIC PANEL (14); Future  - LIPID PANEL;  Future  - TSH W REFLEX TO FREE T4;

## 2020-06-25 DIAGNOSIS — R61 NIGHT SWEATS: Primary | ICD-10-CM

## 2020-07-01 ENCOUNTER — NURSE ONLY (OUTPATIENT)
Dept: INTERNAL MEDICINE CLINIC | Facility: CLINIC | Age: 45
End: 2020-07-01

## 2020-07-01 DIAGNOSIS — Z00.00 ANNUAL PHYSICAL EXAM: ICD-10-CM

## 2020-07-01 DIAGNOSIS — E65 LIPOHYPERTROPHY: ICD-10-CM

## 2020-07-01 DIAGNOSIS — I10 ESSENTIAL HYPERTENSION: ICD-10-CM

## 2020-07-01 DIAGNOSIS — R61 NIGHT SWEATS: ICD-10-CM

## 2020-07-01 DIAGNOSIS — E66.01 MORBID OBESITY (HCC): ICD-10-CM

## 2020-07-01 DIAGNOSIS — D12.6 ADENOMATOUS POLYP OF COLON, UNSPECIFIED PART OF COLON: ICD-10-CM

## 2020-07-01 LAB
ALBUMIN SERPL-MCNC: 3.8 G/DL (ref 3.4–5)
ALBUMIN/GLOB SERPL: 1.1 {RATIO} (ref 1–2)
ALP LIVER SERPL-CCNC: 163 U/L (ref 37–98)
ALT SERPL-CCNC: 80 U/L (ref 13–56)
ANION GAP SERPL CALC-SCNC: 7 MMOL/L (ref 0–18)
AST SERPL-CCNC: 30 U/L (ref 15–37)
BILIRUB SERPL-MCNC: 0.3 MG/DL (ref 0.1–2)
BUN BLD-MCNC: 20 MG/DL (ref 7–18)
BUN/CREAT SERPL: 24.7 (ref 10–20)
CALCIUM BLD-MCNC: 10 MG/DL (ref 8.5–10.1)
CHLORIDE SERPL-SCNC: 112 MMOL/L (ref 98–112)
CHOLEST SMN-MCNC: 165 MG/DL (ref ?–200)
CO2 SERPL-SCNC: 23 MMOL/L (ref 21–32)
CREAT BLD-MCNC: 0.81 MG/DL (ref 0.55–1.02)
DEPRECATED RDW RBC AUTO: 43.8 FL (ref 35.1–46.3)
ERYTHROCYTE [DISTWIDTH] IN BLOOD BY AUTOMATED COUNT: 13.1 % (ref 11–15)
FSH SERPL-ACNC: 2.7 MIU/ML
GLOBULIN PLAS-MCNC: 3.6 G/DL (ref 2.8–4.4)
GLUCOSE BLD-MCNC: 95 MG/DL (ref 70–99)
HCT VFR BLD AUTO: 47.6 % (ref 35–48)
HDLC SERPL-MCNC: 33 MG/DL (ref 40–59)
HGB BLD-MCNC: 15.6 G/DL (ref 12–16)
LDLC SERPL CALC-MCNC: 106 MG/DL (ref ?–100)
LH SERPL-ACNC: 5 MIU/ML
M PROTEIN MFR SERPL ELPH: 7.4 G/DL (ref 6.4–8.2)
MCH RBC QN AUTO: 29.8 PG (ref 26–34)
MCHC RBC AUTO-ENTMCNC: 32.8 G/DL (ref 31–37)
MCV RBC AUTO: 91 FL (ref 80–100)
NONHDLC SERPL-MCNC: 132 MG/DL (ref ?–130)
OSMOLALITY SERPL CALC.SUM OF ELEC: 296 MOSM/KG (ref 275–295)
PATIENT FASTING Y/N/NP: YES
PATIENT FASTING Y/N/NP: YES
PLATELET # BLD AUTO: 331 10(3)UL (ref 150–450)
POTASSIUM SERPL-SCNC: 4.1 MMOL/L (ref 3.5–5.1)
RBC # BLD AUTO: 5.23 X10(6)UL (ref 3.8–5.3)
SODIUM SERPL-SCNC: 142 MMOL/L (ref 136–145)
T3FREE SERPL-MCNC: 2.78 PG/ML (ref 2.4–4.2)
T4 FREE SERPL-MCNC: 1 NG/DL (ref 0.8–1.7)
TRIGL SERPL-MCNC: 132 MG/DL (ref 30–149)
TSI SER-ACNC: 0.29 MIU/ML (ref 0.36–3.74)
VLDLC SERPL CALC-MCNC: 26 MG/DL (ref 0–30)
WBC # BLD AUTO: 14.2 X10(3) UL (ref 4–11)

## 2020-07-01 PROCEDURE — 36415 COLL VENOUS BLD VENIPUNCTURE: CPT | Performed by: FAMILY MEDICINE

## 2020-07-01 PROCEDURE — 83001 ASSAY OF GONADOTROPIN (FSH): CPT | Performed by: FAMILY MEDICINE

## 2020-07-01 PROCEDURE — 84481 FREE ASSAY (FT-3): CPT | Performed by: FAMILY MEDICINE

## 2020-07-01 PROCEDURE — 80061 LIPID PANEL: CPT | Performed by: FAMILY MEDICINE

## 2020-07-01 PROCEDURE — 84439 ASSAY OF FREE THYROXINE: CPT | Performed by: FAMILY MEDICINE

## 2020-07-01 PROCEDURE — 85027 COMPLETE CBC AUTOMATED: CPT | Performed by: FAMILY MEDICINE

## 2020-07-01 PROCEDURE — 80053 COMPREHEN METABOLIC PANEL: CPT | Performed by: FAMILY MEDICINE

## 2020-07-01 PROCEDURE — 83002 ASSAY OF GONADOTROPIN (LH): CPT | Performed by: FAMILY MEDICINE

## 2020-07-01 PROCEDURE — 84443 ASSAY THYROID STIM HORMONE: CPT | Performed by: FAMILY MEDICINE

## 2020-07-01 NOTE — PROGRESS NOTES
Pt presented to clinic today for blood draw. Per physician able to draw orders. Orders  documented within chart. Pt tolerated lab draw well.  verified.   Orders drawn include: cmp, lipid, tdh, cbc, LH, FSH  Site of draw: rt shayla Strong CMA

## 2020-07-27 ENCOUNTER — HOSPITAL ENCOUNTER (OUTPATIENT)
Dept: MAMMOGRAPHY | Age: 45
Discharge: HOME | End: 2020-07-27
Attending: FAMILY MEDICINE
Payer: COMMERCIAL

## 2020-07-27 DIAGNOSIS — Z12.39 BREAST CANCER SCREENING: ICD-10-CM

## 2020-07-27 PROCEDURE — 77063 BREAST TOMOSYNTHESIS BI: CPT | Performed by: FAMILY MEDICINE

## 2020-07-27 PROCEDURE — 77067 SCR MAMMO BI INCL CAD: CPT | Performed by: FAMILY MEDICINE

## 2020-08-11 DIAGNOSIS — E66.01 MORBID OBESITY (HCC): ICD-10-CM

## 2020-08-11 RX ORDER — PEN NEEDLE, DIABETIC 30 GX3/16"
1 NEEDLE, DISPOSABLE MISCELLANEOUS DAILY
Qty: 90 EACH | Refills: 0 | Status: SHIPPED | OUTPATIENT
Start: 2020-08-11 | End: 2020-09-29

## 2020-08-12 DIAGNOSIS — E66.01 MORBID OBESITY (HCC): ICD-10-CM

## 2020-08-19 ENCOUNTER — OFFICE VISIT (OUTPATIENT)
Dept: DERMATOLOGY CLINIC | Facility: CLINIC | Age: 45
End: 2020-08-19

## 2020-08-19 DIAGNOSIS — D23.30 BENIGN NEOPLASM OF SKIN OF FACE: ICD-10-CM

## 2020-08-19 DIAGNOSIS — D23.60 BENIGN NEOPLASM OF SKIN OF UPPER LIMB, INCLUDING SHOULDER, UNSPECIFIED LATERALITY: ICD-10-CM

## 2020-08-19 DIAGNOSIS — D23.5 BENIGN NEOPLASM OF SKIN OF TRUNK, EXCEPT SCROTUM: ICD-10-CM

## 2020-08-19 DIAGNOSIS — D23.70 BENIGN NEOPLASM OF SKIN OF LOWER LIMB, INCLUDING HIP, UNSPECIFIED LATERALITY: ICD-10-CM

## 2020-08-19 DIAGNOSIS — D23.4 BENIGN NEOPLASM OF SCALP AND SKIN OF NECK: ICD-10-CM

## 2020-08-19 DIAGNOSIS — D22.9 MULTIPLE NEVI: Primary | ICD-10-CM

## 2020-08-19 PROCEDURE — 99203 OFFICE O/P NEW LOW 30 MIN: CPT | Performed by: DERMATOLOGY

## 2020-08-24 NOTE — PROGRESS NOTES
Andrew Hsieh is a 41year old female. HPI:     CC:  Patient presents with:  Full Skin Exam: LOV 6/28/2016 Patient present for full body skin exam. Patient denies any hx of sc        Allergies:  Patient has no known allergies.     HISTORY:    Past Medica Diagnosis Date   • Asthma    • Disorder of liver     Fatty Liver   • Essential hypertension    • Obesity    • Sleep apnea      Past Surgical History:   Procedure Laterality Date   • CHOLECYSTECTOMY  2002   • COLONOSCOPY N/A 1/30/2018    Performed by Joe Not Asked        Blood Transfusions: Not Asked        Caffeine Concern: Yes          1 cup daily soda/coffee        Occupational Exposure: Not Asked        Hobby Hazards: Not Asked        Sleep Concern: Not Asked        Stress Concern: Not Asked        Evelia Jacobo medications, allergies reviewed as noted. Physical Examination:     Well-developed well-nourished patient alert oriented in no acute distress.   Exam total-body performed, including scalp, head, neck, face,nails, hair, external eyes, including conjunc reviewed. See  Medications in grid. Instructions reviewed at length. Benign nevi, seborrheic  keratoses, cherry angiomas:  Reassurance regarding other benign skin lesions. Signs and symptoms of skin cancer, ABCDE's of melanoma discussed with patient.  Nito Sweeney

## 2020-09-28 DIAGNOSIS — E66.01 MORBID OBESITY (HCC): ICD-10-CM

## 2020-09-29 RX ORDER — LISINOPRIL AND HYDROCHLOROTHIAZIDE 12.5; 1 MG/1; MG/1
TABLET ORAL
Qty: 90 TABLET | Refills: 1 | Status: SHIPPED | OUTPATIENT
Start: 2020-09-29 | End: 2021-06-17 | Stop reason: SINTOL

## 2020-09-29 RX ORDER — PEN NEEDLE, DIABETIC 32GX 5/32"
NEEDLE, DISPOSABLE MISCELLANEOUS
Qty: 100 EACH | Refills: 0 | Status: SHIPPED | OUTPATIENT
Start: 2020-09-29 | End: 2021-08-11 | Stop reason: ALTCHOICE

## 2020-10-04 DIAGNOSIS — E66.01 MORBID OBESITY (HCC): ICD-10-CM

## 2020-10-05 RX ORDER — LIRAGLUTIDE 6 MG/ML
3 INJECTION, SOLUTION SUBCUTANEOUS DAILY
Qty: 5 PEN | Refills: 5 | Status: SHIPPED | OUTPATIENT
Start: 2020-10-05 | End: 2021-06-17 | Stop reason: ALTCHOICE

## 2020-10-28 ENCOUNTER — TELEPHONE (OUTPATIENT)
Dept: CASE MANAGEMENT | Age: 45
End: 2020-10-28

## 2020-10-28 NOTE — TELEPHONE ENCOUNTER
Insurance prefers Advair Diskus inhaler over Symbicort. Spoke with patient who states she had been on the Advair in the past and it did not work as well for her. She prefers to continue with Symbicort at this time.

## 2020-11-25 DIAGNOSIS — J45.20 MILD INTERMITTENT ASTHMA WITHOUT COMPLICATION: ICD-10-CM

## 2020-11-27 RX ORDER — BUDESONIDE AND FORMOTEROL FUMARATE DIHYDRATE 160; 4.5 UG/1; UG/1
2 AEROSOL RESPIRATORY (INHALATION) 2 TIMES DAILY
Qty: 2 INHALER | Refills: 0 | Status: SHIPPED | OUTPATIENT
Start: 2020-11-27 | End: 2021-01-26

## 2020-11-28 DIAGNOSIS — I10 ESSENTIAL HYPERTENSION: ICD-10-CM

## 2020-11-28 DIAGNOSIS — R00.0 TACHYCARDIA: ICD-10-CM

## 2020-11-28 RX ORDER — ATENOLOL 50 MG/1
50 TABLET ORAL DAILY
Qty: 90 TABLET | Refills: 1 | Status: CANCELLED | OUTPATIENT
Start: 2020-11-28

## 2020-11-30 ENCOUNTER — TELEPHONE (OUTPATIENT)
Dept: GASTROENTEROLOGY | Facility: CLINIC | Age: 45
End: 2020-11-30

## 2020-11-30 DIAGNOSIS — I10 ESSENTIAL HYPERTENSION: ICD-10-CM

## 2020-11-30 DIAGNOSIS — R00.0 TACHYCARDIA: ICD-10-CM

## 2020-11-30 RX ORDER — ATENOLOL 50 MG/1
50 TABLET ORAL DAILY
Qty: 90 TABLET | Refills: 1 | Status: SHIPPED | OUTPATIENT
Start: 2020-11-30 | End: 2021-07-31

## 2020-11-30 NOTE — TELEPHONE ENCOUNTER
----- Message from Anthony Amaya, [address] sent at 2/15/2018  5:07 PM CST -----  Regarding: recall colon  Recall colon in 3 years per CB.  Colon done 1-30-18

## 2020-12-01 ENCOUNTER — PATIENT MESSAGE (OUTPATIENT)
Dept: INTERNAL MEDICINE CLINIC | Facility: CLINIC | Age: 45
End: 2020-12-01

## 2020-12-01 DIAGNOSIS — Z12.11 SCREENING FOR COLON CANCER: Primary | ICD-10-CM

## 2020-12-02 NOTE — TELEPHONE ENCOUNTER
From: Gary Dockery  To: Matt Tavarez MD  Sent: 12/1/2020 3:20 PM CST  Subject: Other    Hello,    I would like a referral to see Dr Yash Corrales in January for my 3 year colonoscopy.  Please let me know when you send it over, so I can schedule my ap

## 2020-12-07 ENCOUNTER — TELEPHONE (OUTPATIENT)
Dept: GASTROENTEROLOGY | Facility: CLINIC | Age: 45
End: 2020-12-07

## 2020-12-07 DIAGNOSIS — Z86.010 PERSONAL HISTORY OF COLONIC POLYPS: Primary | ICD-10-CM

## 2020-12-09 RX ORDER — POLYETHYLENE GLYCOL 3350, SODIUM CHLORIDE, POTASSIUM CHLORIDE, SODIUM BICARBONATE, AND SODIUM SULFATE 240; 5.84; 2.98; 6.72; 22.72 G/4L; G/4L; G/4L; G/4L; G/4L
POWDER, FOR SOLUTION ORAL
Qty: 1 BOTTLE | Refills: 0 | Status: SHIPPED | OUTPATIENT
Start: 2020-12-09 | End: 2021-06-17 | Stop reason: ALTCHOICE

## 2020-12-09 NOTE — TELEPHONE ENCOUNTER
Last Procedure, Date, MD:  01/302018, colonoscopy, Dr Andrea Sutherland  Last Diagnosis:  Hyperplastic and adenomas  Recalled for (mth/yrs): 3 years  Sedation used previously: MAC   Last Prep Used (if known: Golytely  Quality of prep (if known): fair  Anticoagulants:

## 2020-12-09 NOTE — TELEPHONE ENCOUNTER
Thanks all. OV with Dr Bob Mina 6/23/2020 reviewed. Morbid obesity and smoking discussed.     Labs 7/1/2020 showed new abnormal LFTs      GI schedulers –    Please schedule colonoscopy exam at [address] [place] [address] only    BMI Readings from Last 1 Encounters:  06/23/

## 2020-12-09 NOTE — TELEPHONE ENCOUNTER
COLONOSCOPY PROCEDURE REPORT     DATE OF PROCEDURE:  1/30/2018      PCP: Tuyet Waite MD     PREOPERATIVE DIAGNOSIS: Acute abdominal pain, change in bowel patterns, blood in stool, abnormal CT scan of the colon. POSTOPERATIVE DIAGNOSIS:  See impression. hemorrhoids     RECOMMENDATIONS:  · Follow-up above colitis biopsy results. Suspect ischemic colitis as per admitting consultation.   · Follow-up above colon polyp pathology results  · Repeat colonoscopy examination in 3 years or as per colon polyp patholo

## 2020-12-15 NOTE — TELEPHONE ENCOUNTER
Scheduled for:  Colonoscopy - 94926  Provider Name:  Dr. Ernst Reynoso  Date:  2/2/21  Location:  [facility]  Sedation:  MAC  Time:  1:30 pm (pt is aware to arrive at 12:30 pm)  Prep:  Golytely, Prep instructions were given to pt over the phone, pt verbalized understand

## 2020-12-30 ENCOUNTER — TELEPHONE (OUTPATIENT)
Dept: GASTROENTEROLOGY | Facility: CLINIC | Age: 45
End: 2020-12-30

## 2020-12-30 DIAGNOSIS — Z86.010 PERSONAL HISTORY OF COLONIC POLYPS: Primary | ICD-10-CM

## 2020-12-30 RX ORDER — SODIUM, POTASSIUM,MAG SULFATES 17.5-3.13G
SOLUTION, RECONSTITUTED, ORAL ORAL
Qty: 1 BOTTLE | Refills: 0 | Status: SHIPPED | OUTPATIENT
Start: 2020-12-30 | End: 2021-06-17 | Stop reason: ALTCHOICE

## 2020-12-30 NOTE — TELEPHONE ENCOUNTER
Recheduled for:  Colonoscopy - 12951  Provider Name:  Dr. Doroteo Orantes  Date:  FROM - 2/2/21                    TO - 3/30/21  Location:  [facility]  Sedation:  MAC  Time:  FROM - 1:30 pm                  TO - 7:30 am (pt is aware to arrive at 6:30 am)  Prep:  Golytely,

## 2020-12-30 NOTE — TELEPHONE ENCOUNTER
I spoke to the pharmacist at [facility].    Golytely is on backorder    Suprep is $60.00. I sent an order for the [address].  Pt can decide if they want to  the Suprep or the Golytely closer to the procedure date  03/30/2021

## 2020-12-30 NOTE — TELEPHONE ENCOUNTER
Pt has CLN scheduled for 2/2/2021 and inquiring if there is an appt available to have CLN on 3/30/2021.  Please call [phone]

## 2020-12-30 NOTE — TELEPHONE ENCOUNTER
Pt states pharmacy does not carry prep and requesting to have script changed to SUPREP bowel kit.          Current Outpatient Medications:   •  PEG 3350-KCl-NaBcb-NaCl-NaSulf (PEG 3350/ELECTROLYTES) 240 g Oral Recon Soln, Take as directed according to colon

## 2021-01-26 DIAGNOSIS — J45.20 MILD INTERMITTENT ASTHMA WITHOUT COMPLICATION: ICD-10-CM

## 2021-01-26 RX ORDER — BUDESONIDE AND FORMOTEROL FUMARATE DIHYDRATE 160; 4.5 UG/1; UG/1
2 AEROSOL RESPIRATORY (INHALATION) 2 TIMES DAILY
Qty: 1 INHALER | Refills: 0 | Status: SHIPPED | OUTPATIENT
Start: 2021-01-26 | End: 2021-03-10

## 2021-02-01 DIAGNOSIS — Z23 NEED FOR VACCINATION: ICD-10-CM

## 2021-02-06 ENCOUNTER — IMMUNIZATION (OUTPATIENT)
Dept: LAB | Age: 46
End: 2021-02-06
Attending: HOSPITALIST
Payer: COMMERCIAL

## 2021-02-06 DIAGNOSIS — Z23 NEED FOR VACCINATION: Primary | ICD-10-CM

## 2021-02-06 PROCEDURE — 0001A SARSCOV2 VAC 30MCG/0.3ML IM: CPT

## 2021-02-17 DIAGNOSIS — J45.20 MILD INTERMITTENT ASTHMA WITHOUT COMPLICATION: ICD-10-CM

## 2021-02-17 RX ORDER — BUDESONIDE AND FORMOTEROL FUMARATE DIHYDRATE 160; 4.5 UG/1; UG/1
AEROSOL RESPIRATORY (INHALATION)
Qty: 10.2 INHALER | Refills: 0 | OUTPATIENT
Start: 2021-02-17

## 2021-02-27 ENCOUNTER — IMMUNIZATION (OUTPATIENT)
Dept: LAB | Age: 46
End: 2021-02-27
Attending: HOSPITALIST
Payer: COMMERCIAL

## 2021-02-27 DIAGNOSIS — Z23 NEED FOR VACCINATION: Primary | ICD-10-CM

## 2021-02-27 PROCEDURE — 0002A SARSCOV2 VAC 30MCG/0.3ML IM: CPT

## 2021-03-09 DIAGNOSIS — J45.20 MILD INTERMITTENT ASTHMA WITHOUT COMPLICATION: ICD-10-CM

## 2021-03-10 RX ORDER — BUDESONIDE AND FORMOTEROL FUMARATE DIHYDRATE 160; 4.5 UG/1; UG/1
2 AEROSOL RESPIRATORY (INHALATION) 2 TIMES DAILY
Qty: 10.2 INHALER | Refills: 0 | Status: SHIPPED | OUTPATIENT
Start: 2021-03-10 | End: 2021-06-17

## 2021-03-26 ENCOUNTER — TELEPHONE (OUTPATIENT)
Dept: GASTROENTEROLOGY | Facility: CLINIC | Age: 46
End: 2021-03-26

## 2021-03-27 ENCOUNTER — LAB ENCOUNTER (OUTPATIENT)
Dept: LAB | Age: 46
End: 2021-03-27
Attending: INTERNAL MEDICINE
Payer: COMMERCIAL

## 2021-03-27 DIAGNOSIS — Z01.818 PRE-OP TESTING: ICD-10-CM

## 2021-03-27 LAB — SARS-COV-2 RNA RESP QL NAA+PROBE: NOT DETECTED

## 2021-03-29 NOTE — TELEPHONE ENCOUNTER
Pt just wanted to make sure she is supposed to start her prep at 5 pm this evening. I confirmed this was correct.      She will start the second half at 1:30 am and will arrive at the hospital at 6:30 am    All questions were answered

## 2021-03-30 ENCOUNTER — ANESTHESIA EVENT (OUTPATIENT)
Dept: ENDOSCOPY | Facility: HOSPITAL | Age: 46
End: 2021-03-30
Payer: COMMERCIAL

## 2021-03-30 ENCOUNTER — HOSPITAL ENCOUNTER (OUTPATIENT)
Facility: HOSPITAL | Age: 46
Setting detail: HOSPITAL OUTPATIENT SURGERY
Discharge: HOME | End: 2021-03-30
Attending: INTERNAL MEDICINE | Admitting: INTERNAL MEDICINE
Payer: COMMERCIAL

## 2021-03-30 ENCOUNTER — ANESTHESIA (OUTPATIENT)
Dept: ENDOSCOPY | Facility: HOSPITAL | Age: 46
End: 2021-03-30
Payer: COMMERCIAL

## 2021-03-30 VITALS
DIASTOLIC BLOOD PRESSURE: 93 MMHG | TEMPERATURE: 97 F | OXYGEN SATURATION: 96 % | RESPIRATION RATE: 14 BRPM | WEIGHT: 260 LBS | HEIGHT: 63 IN | HEART RATE: 88 BPM | SYSTOLIC BLOOD PRESSURE: 136 MMHG | BODY MASS INDEX: 46.07 KG/M2

## 2021-03-30 DIAGNOSIS — Z86.010 PERSONAL HISTORY OF COLONIC POLYPS: ICD-10-CM

## 2021-03-30 DIAGNOSIS — Z01.818 PRE-OP TESTING: Primary | ICD-10-CM

## 2021-03-30 PROCEDURE — 0DBN8ZX EXCISION OF SIGMOID COLON, VIA NATURAL OR ARTIFICIAL OPENING ENDOSCOPIC, DIAGNOSTIC: ICD-10-PCS | Performed by: INTERNAL MEDICINE

## 2021-03-30 PROCEDURE — 45385 COLONOSCOPY W/LESION REMOVAL: CPT | Performed by: INTERNAL MEDICINE

## 2021-03-30 PROCEDURE — 0DBL8ZX EXCISION OF TRANSVERSE COLON, VIA NATURAL OR ARTIFICIAL OPENING ENDOSCOPIC, DIAGNOSTIC: ICD-10-PCS | Performed by: INTERNAL MEDICINE

## 2021-03-30 RX ORDER — NALOXONE HYDROCHLORIDE 0.4 MG/ML
80 INJECTION, SOLUTION INTRAMUSCULAR; INTRAVENOUS; SUBCUTANEOUS AS NEEDED
Status: DISCONTINUED | OUTPATIENT
Start: 2021-03-30 | End: 2021-03-30

## 2021-03-30 RX ORDER — LIDOCAINE HYDROCHLORIDE 10 MG/ML
INJECTION, SOLUTION EPIDURAL; INFILTRATION; INTRACAUDAL; PERINEURAL AS NEEDED
Status: DISCONTINUED | OUTPATIENT
Start: 2021-03-30 | End: 2021-03-30 | Stop reason: SURG

## 2021-03-30 RX ORDER — SODIUM CHLORIDE, SODIUM LACTATE, POTASSIUM CHLORIDE, CALCIUM CHLORIDE 600; 310; 30; 20 MG/100ML; MG/100ML; MG/100ML; MG/100ML
INJECTION, SOLUTION INTRAVENOUS CONTINUOUS
Status: DISCONTINUED | OUTPATIENT
Start: 2021-03-30 | End: 2021-03-30

## 2021-03-30 RX ADMIN — LIDOCAINE HYDROCHLORIDE 30 MG: 10 INJECTION, SOLUTION EPIDURAL; INFILTRATION; INTRACAUDAL; PERINEURAL at 07:47:00

## 2021-03-30 RX ADMIN — SODIUM CHLORIDE, SODIUM LACTATE, POTASSIUM CHLORIDE, CALCIUM CHLORIDE: 600; 310; 30; 20 INJECTION, SOLUTION INTRAVENOUS at 08:15:00

## 2021-03-30 NOTE — ANESTHESIA PREPROCEDURE EVALUATION
Anesthesia PreOp Note    HPI:     Nimco Comer is a 41year old female who presents for preoperative consultation requested by: Lucia De La Cruz MD    Date of Surgery: 3/30/2021    Procedure(s):  COLONOSCOPY  Indication: Personal history of col 3/23/2021  Liraglutide -Weight Management (SAXENDA) 18 MG/3ML Subcutaneous Solution Pen-injector, Inject 3 mg into the skin daily. , Disp: 5 pen, Rfl: 5, 3/23/2021  LISINOPRIL-HYDROCHLOROTHIAZIDE 10-12.5 MG Oral Tab, TAKE 1 TABLET BY MOUTH EVERY DAY, Disp: Caffeine Concern: Yes          1 cup daily soda/coffee        Occupational Exposure: Not Asked        Hobby Hazards: Not Asked        Sleep Concern: Not Asked        Stress Concern: Not Asked        Weight Concern: Not Asked        Special Diet: Not Ask saturation is 96%.     03/30/21  0706   BP: (!) 153/97   Pulse: 99   Resp: 18   Temp: 98.4 °F (36.9 °C)   TempSrc: Oral   SpO2: 96%   Weight: 117.9 kg (260 lb)   Height: 1.6 m (5' 3\")        Anesthesia Evaluation     Patient summary reviewed and Nursing no

## 2021-03-30 NOTE — ANESTHESIA POSTPROCEDURE EVALUATION
Patient: Elvis Tejada    Procedure Summary     Date: 03/30/21 Room / Location: [facility] ENDOSCOPY 05 / [facility] ENDOSCOPY    Anesthesia Start: 9896 Anesthesia Stop: 8675    Procedure: COLONOSCOPY (N/A ) Diagnosis:       Personal history of colonic polyps      (co

## 2021-03-30 NOTE — OPERATIVE REPORT
COLONOSCOPY PROCEDURE REPORT     DATE OF PROCEDURE:  3/30/2021     PCP: Matt Tavarez MD     PREOPERATIVE DIAGNOSIS:  History adenomatous colon polyps     POSTOPERATIVE DIAGNOSIS:  See impression. SURGEON:  DA Dominguez Net:    MA

## 2021-03-30 NOTE — H&P
History & Physical Examination    Patient Name: Concha Ortega  MRN: [id number]  CSN: [id number]  YOB: 1975    Diagnosis: History adenomatous colon polyps    Present Illness: 72-year-old woman with BMI 46, smoking history, history of acute is Sleep apnea      Past Surgical History:   Procedure Laterality Date   • CHOLECYSTECTOMY  2002   • COLONOSCOPY N/A 1/30/2018    Performed by Parker Lerner MD at [facility] ENDOSCOPY   • CYST ASPIRATION LEFT     • [address]

## 2021-04-20 ENCOUNTER — TELEPHONE (OUTPATIENT)
Dept: GASTROENTEROLOGY | Facility: CLINIC | Age: 46
End: 2021-04-20

## 2021-04-20 NOTE — TELEPHONE ENCOUNTER
Results letter mailed to patient per   Colon recall entered for repeat in 5 yrs,3/30/2026  Colon done in 3/30/2021  HM Updated and Patient Outreach was placed for Colon recall    Jose Elias Decker MD  P [facility] Gi Clinical Staff  GI RNs - 1.  Pl

## 2021-06-17 ENCOUNTER — OFFICE VISIT (OUTPATIENT)
Dept: INTERNAL MEDICINE CLINIC | Facility: CLINIC | Age: 46
End: 2021-06-17

## 2021-06-17 VITALS
RESPIRATION RATE: 17 BRPM | DIASTOLIC BLOOD PRESSURE: 98 MMHG | BODY MASS INDEX: 48.9 KG/M2 | HEART RATE: 77 BPM | SYSTOLIC BLOOD PRESSURE: 152 MMHG | HEIGHT: 63 IN | WEIGHT: 276 LBS | OXYGEN SATURATION: 98 %

## 2021-06-17 DIAGNOSIS — M79.672 PAIN OF LEFT HEEL: Primary | ICD-10-CM

## 2021-06-17 DIAGNOSIS — J45.20 MILD INTERMITTENT ASTHMA WITHOUT COMPLICATION: ICD-10-CM

## 2021-06-17 PROCEDURE — 3080F DIAST BP >= 90 MM HG: CPT | Performed by: FAMILY MEDICINE

## 2021-06-17 PROCEDURE — 3077F SYST BP >= 140 MM HG: CPT | Performed by: FAMILY MEDICINE

## 2021-06-17 PROCEDURE — 3008F BODY MASS INDEX DOCD: CPT | Performed by: FAMILY MEDICINE

## 2021-06-17 PROCEDURE — 99214 OFFICE O/P EST MOD 30 MIN: CPT | Performed by: FAMILY MEDICINE

## 2021-06-17 RX ORDER — BUDESONIDE AND FORMOTEROL FUMARATE DIHYDRATE 160; 4.5 UG/1; UG/1
2 AEROSOL RESPIRATORY (INHALATION) 2 TIMES DAILY
Qty: 10.2 G | Refills: 3 | Status: SHIPPED | OUTPATIENT
Start: 2021-06-17 | End: 2021-07-21

## 2021-06-17 RX ORDER — ALBUTEROL SULFATE 90 UG/1
2 AEROSOL, METERED RESPIRATORY (INHALATION) EVERY 6 HOURS PRN
Qty: 6.7 G | Refills: 0 | Status: SHIPPED | OUTPATIENT
Start: 2021-06-17 | End: 2021-07-17

## 2021-06-17 RX ORDER — LISINOPRIL 20 MG/1
20 TABLET ORAL DAILY
Qty: 30 TABLET | Refills: 3 | Status: SHIPPED | OUTPATIENT
Start: 2021-06-17 | End: 2021-08-31

## 2021-06-17 NOTE — PROGRESS NOTES
PATIENT IDENTIFICATION  Name: Elvis Tejada  MRN: [id number]    Diagnoses:   Pain of left heel  (primary encounter diagnosis)  Mild intermittent asthma without complication  BMI 37.8-07.0, adult ([facility] [address]    SUBJECTIVE:  Elvis Tejada is a 40year old fema Alcohol/week: 0.0 standard drinks      Comment: 2-3 drinks/month    Drug use: No      Current Outpatient Medications   Medication Sig Dispense Refill   • lisinopril 20 MG Oral Tab Take 1 tablet (20 mg total) by mouth daily.  30 tablet 3   • Budesonide-Formo regular cardio and muscle building exercise. Referred to bariatrics for further discussion of options. Patient to return soon for full physical.     Patient was given return and ED precautions and endorsed understanding.      Norberto Santana MD    Encounter

## 2021-06-18 ENCOUNTER — PATIENT MESSAGE (OUTPATIENT)
Dept: INTERNAL MEDICINE CLINIC | Facility: CLINIC | Age: 46
End: 2021-06-18

## 2021-07-02 ENCOUNTER — OFFICE VISIT (OUTPATIENT)
Dept: INTERNAL MEDICINE CLINIC | Facility: CLINIC | Age: 46
End: 2021-07-02

## 2021-07-02 VITALS
WEIGHT: 274 LBS | OXYGEN SATURATION: 97 % | SYSTOLIC BLOOD PRESSURE: 138 MMHG | HEART RATE: 84 BPM | DIASTOLIC BLOOD PRESSURE: 88 MMHG | BODY MASS INDEX: 49 KG/M2

## 2021-07-02 DIAGNOSIS — Z13.1 SCREENING FOR DIABETES MELLITUS: ICD-10-CM

## 2021-07-02 DIAGNOSIS — I10 ESSENTIAL HYPERTENSION: ICD-10-CM

## 2021-07-02 DIAGNOSIS — R79.89 ABNORMAL TSH: ICD-10-CM

## 2021-07-02 DIAGNOSIS — R74.8 ELEVATED ALKALINE PHOSPHATASE LEVEL: ICD-10-CM

## 2021-07-02 DIAGNOSIS — Z00.00 PREVENTATIVE HEALTH CARE: Primary | ICD-10-CM

## 2021-07-02 DIAGNOSIS — Z12.31 VISIT FOR SCREENING MAMMOGRAM: ICD-10-CM

## 2021-07-02 DIAGNOSIS — Z13.220 SCREENING FOR LIPID DISORDERS: ICD-10-CM

## 2021-07-02 LAB
ALBUMIN SERPL-MCNC: 3.8 G/DL (ref 3.4–5)
ALBUMIN/GLOB SERPL: 1 {RATIO} (ref 1–2)
ALP LIVER SERPL-CCNC: 130 U/L
ALT SERPL-CCNC: 61 U/L
ANION GAP SERPL CALC-SCNC: 10 MMOL/L (ref 0–18)
AST SERPL-CCNC: 33 U/L (ref 15–37)
BILIRUB SERPL-MCNC: 0.5 MG/DL (ref 0.1–2)
BUN BLD-MCNC: 17 MG/DL (ref 7–18)
BUN/CREAT SERPL: 23.3 (ref 10–20)
CALCIUM BLD-MCNC: 10.2 MG/DL (ref 8.5–10.1)
CHLORIDE SERPL-SCNC: 109 MMOL/L (ref 98–112)
CHOLEST SMN-MCNC: 209 MG/DL (ref ?–200)
CO2 SERPL-SCNC: 22 MMOL/L (ref 21–32)
CREAT BLD-MCNC: 0.73 MG/DL
DEPRECATED RDW RBC AUTO: 43.8 FL (ref 35.1–46.3)
ERYTHROCYTE [DISTWIDTH] IN BLOOD BY AUTOMATED COUNT: 13 % (ref 11–15)
EST. AVERAGE GLUCOSE BLD GHB EST-MCNC: 114 MG/DL (ref 68–126)
GGT SERPL-CCNC: 91 U/L
GLOBULIN PLAS-MCNC: 4 G/DL (ref 2.8–4.4)
GLUCOSE BLD-MCNC: 89 MG/DL (ref 70–99)
HBA1C MFR BLD HPLC: 5.6 % (ref ?–5.7)
HCT VFR BLD AUTO: 48.6 %
HDLC SERPL-MCNC: 45 MG/DL (ref 40–59)
HGB BLD-MCNC: 15.4 G/DL
LDLC SERPL CALC-MCNC: 136 MG/DL (ref ?–100)
M PROTEIN MFR SERPL ELPH: 7.8 G/DL (ref 6.4–8.2)
MCH RBC QN AUTO: 29.2 PG (ref 26–34)
MCHC RBC AUTO-ENTMCNC: 31.7 G/DL (ref 31–37)
MCV RBC AUTO: 92 FL
NONHDLC SERPL-MCNC: 164 MG/DL (ref ?–130)
OSMOLALITY SERPL CALC.SUM OF ELEC: 293 MOSM/KG (ref 275–295)
PATIENT FASTING Y/N/NP: YES
PATIENT FASTING Y/N/NP: YES
PLATELET # BLD AUTO: 416 10(3)UL (ref 150–450)
POTASSIUM SERPL-SCNC: 4.2 MMOL/L (ref 3.5–5.1)
RBC # BLD AUTO: 5.28 X10(6)UL
SODIUM SERPL-SCNC: 141 MMOL/L (ref 136–145)
TRIGL SERPL-MCNC: 155 MG/DL (ref 30–149)
TSI SER-ACNC: 0.65 MIU/ML (ref 0.36–3.74)
VLDLC SERPL CALC-MCNC: 28 MG/DL (ref 0–30)
WBC # BLD AUTO: 10.9 X10(3) UL (ref 4–11)

## 2021-07-02 PROCEDURE — 80061 LIPID PANEL: CPT | Performed by: FAMILY MEDICINE

## 2021-07-02 PROCEDURE — 82977 ASSAY OF GGT: CPT | Performed by: FAMILY MEDICINE

## 2021-07-02 PROCEDURE — 84443 ASSAY THYROID STIM HORMONE: CPT | Performed by: FAMILY MEDICINE

## 2021-07-02 PROCEDURE — 85027 COMPLETE CBC AUTOMATED: CPT | Performed by: FAMILY MEDICINE

## 2021-07-02 PROCEDURE — 3079F DIAST BP 80-89 MM HG: CPT | Performed by: FAMILY MEDICINE

## 2021-07-02 PROCEDURE — 83036 HEMOGLOBIN GLYCOSYLATED A1C: CPT | Performed by: FAMILY MEDICINE

## 2021-07-02 PROCEDURE — 80053 COMPREHEN METABOLIC PANEL: CPT | Performed by: FAMILY MEDICINE

## 2021-07-02 PROCEDURE — 99396 PREV VISIT EST AGE 40-64: CPT | Performed by: FAMILY MEDICINE

## 2021-07-02 PROCEDURE — 3075F SYST BP GE 130 - 139MM HG: CPT | Performed by: FAMILY MEDICINE

## 2021-07-06 ENCOUNTER — OFFICE VISIT (OUTPATIENT)
Dept: PODIATRY CLINIC | Facility: CLINIC | Age: 46
End: 2021-07-06

## 2021-07-06 VITALS
BODY MASS INDEX: 47.84 KG/M2 | HEIGHT: 63 IN | SYSTOLIC BLOOD PRESSURE: 128 MMHG | DIASTOLIC BLOOD PRESSURE: 78 MMHG | HEART RATE: 80 BPM | WEIGHT: 270 LBS

## 2021-07-06 DIAGNOSIS — R79.89 ELEVATED LFTS: Primary | ICD-10-CM

## 2021-07-06 DIAGNOSIS — M21.6X2 ACQUIRED EQUINUS DEFORMITY OF LEFT FOOT: ICD-10-CM

## 2021-07-06 DIAGNOSIS — M79.672 PAIN OF LEFT HEEL: ICD-10-CM

## 2021-07-06 DIAGNOSIS — M72.2 PLANTAR FASCIITIS OF LEFT FOOT: Primary | ICD-10-CM

## 2021-07-06 PROCEDURE — 3078F DIAST BP <80 MM HG: CPT | Performed by: PODIATRIST

## 2021-07-06 PROCEDURE — 3074F SYST BP LT 130 MM HG: CPT | Performed by: PODIATRIST

## 2021-07-06 PROCEDURE — 20550 NJX 1 TENDON SHEATH/LIGAMENT: CPT | Performed by: PODIATRIST

## 2021-07-06 PROCEDURE — 99203 OFFICE O/P NEW LOW 30 MIN: CPT | Performed by: PODIATRIST

## 2021-07-06 PROCEDURE — 3008F BODY MASS INDEX DOCD: CPT | Performed by: PODIATRIST

## 2021-07-06 RX ORDER — TRIAMCINOLONE ACETONIDE 40 MG/ML
40 INJECTION, SUSPENSION INTRA-ARTICULAR; INTRAMUSCULAR ONCE
Status: COMPLETED | OUTPATIENT
Start: 2021-07-06 | End: 2021-07-06

## 2021-07-06 RX ADMIN — TRIAMCINOLONE ACETONIDE 40 MG: 40 INJECTION, SUSPENSION INTRA-ARTICULAR; INTRAMUSCULAR at 16:18:00

## 2021-07-06 NOTE — PROGRESS NOTES
[facility] Podiatry  Progress Note    Elvis Tejada is a 40year old female. Patient presents with:   Foot Pain: Left instep x 1 month, no known trauma        HPI:     This is a pleasant female that presents to clinic today due to left arch pain x 1 Hodgkin's Lymphoma   • Other (Multiple Sclerosis) Father 64   • Other (Alive and well) Mother    • Breast Cancer Maternal Grandmother 48      Social History    Socioeconomic History      Marital status:       Spouse name: Not on file      Number 0 degrees with increased ankle DF with knee extended.       LABS & IMAGING:     Lab Results   Component Value Date    GLU 89 07/02/2021    BUN 17 07/02/2021    CREATSERUM 0.73 07/02/2021    BUNCREA 23.3 (H) 07/02/2021    ANIONGAP 10 07/02/2021    GFRAA 115 foot  After reviewing options for cortisone injection and risks/potential complications, pt has agreed to proceed.   Using aseptic technique, injection was then given using 1cc 40mg/ml Kenalog mixed with 1 cc of 0.5% marcaine plain using 25 gauge 1.5\" need

## 2021-07-17 RX ORDER — ALBUTEROL SULFATE 90 UG/1
2 AEROSOL, METERED RESPIRATORY (INHALATION) EVERY 6 HOURS PRN
Qty: 18 G | Refills: 1 | Status: SHIPPED | OUTPATIENT
Start: 2021-07-17 | End: 2021-08-31

## 2021-07-21 DIAGNOSIS — J45.20 MILD INTERMITTENT ASTHMA WITHOUT COMPLICATION: ICD-10-CM

## 2021-07-21 RX ORDER — BUDESONIDE AND FORMOTEROL FUMARATE DIHYDRATE 160; 4.5 UG/1; UG/1
2 AEROSOL RESPIRATORY (INHALATION) 2 TIMES DAILY
Qty: 10.2 G | Refills: 2 | Status: SHIPPED | OUTPATIENT
Start: 2021-07-21 | End: 2021-10-19

## 2021-07-27 ENCOUNTER — OFFICE VISIT (OUTPATIENT)
Dept: PODIATRY CLINIC | Facility: CLINIC | Age: 46
End: 2021-07-27

## 2021-07-27 ENCOUNTER — TELEPHONE (OUTPATIENT)
Dept: PODIATRY CLINIC | Facility: CLINIC | Age: 46
End: 2021-07-27

## 2021-07-27 DIAGNOSIS — M72.2 PLANTAR FASCIITIS OF LEFT FOOT: Primary | ICD-10-CM

## 2021-07-27 DIAGNOSIS — M21.6X2 ACQUIRED EQUINUS DEFORMITY OF LEFT FOOT: ICD-10-CM

## 2021-07-27 PROCEDURE — 99212 OFFICE O/P EST SF 10 MIN: CPT | Performed by: PODIATRIST

## 2021-07-27 NOTE — TELEPHONE ENCOUNTER
Pt came to appt w/ Dr Marie Welsh on 07/27/21 and was inquiring on status of orthotic approval. Pt stated someone was going to contact her insurance and call her to discuss making custom orthotics. Please call pt to advise.

## 2021-07-27 NOTE — TELEPHONE ENCOUNTER
[facility] dept -- please see message below. Referral from 07/06/21 for custom orthotics is still pending. Do you mind f/u with this? Thank you.

## 2021-07-27 NOTE — PROGRESS NOTES
[facility], [facility] Podiatry  Progress Note    Veto Stephens is a 40year old female. Patient presents with:   Follow - Up: L foot plantart fascitis, pt states her foot feels much better after cortisone, denies any pain        HPI:     This is a pleasant fema 65   • Other (Alive and well) Mother    • Breast Cancer Maternal Grandmother 48      Social History    Socioeconomic History      Marital status:       Spouse name: Not on file      Number of children: Not on file      Years of education: Not on marc IMAGING:     Lab Results   Component Value Date    GLU 89 07/02/2021    BUN 17 07/02/2021    CREATSERUM 0.73 07/02/2021    BUNCREA 23.3 (H) 07/02/2021    ANIONGAP 10 07/02/2021    GFRAA 115 07/02/2021    GFRNAA 100 07/02/2021    CA 10.2 (H) 07/02/2021    N follow-ups on file.     Benitez Waters, ORTIZ  7/27/21

## 2021-07-28 ENCOUNTER — HOSPITAL ENCOUNTER (OUTPATIENT)
Dept: MAMMOGRAPHY | Age: 46
Discharge: HOME | End: 2021-07-28
Attending: FAMILY MEDICINE
Payer: COMMERCIAL

## 2021-07-28 DIAGNOSIS — Z12.31 VISIT FOR SCREENING MAMMOGRAM: ICD-10-CM

## 2021-07-28 PROCEDURE — 77063 BREAST TOMOSYNTHESIS BI: CPT | Performed by: FAMILY MEDICINE

## 2021-07-28 PROCEDURE — 77067 SCR MAMMO BI INCL CAD: CPT | Performed by: FAMILY MEDICINE

## 2021-07-28 NOTE — TELEPHONE ENCOUNTER
Patient notified. She will inquire of her plan any out of pocket cost and call back to schedule for casting. no memory loss/no hyperactivity/no suicidal ideation/no depression/no visual hallucinations/no insomnia/no agitation/no auditory hallucinations/no anxiety

## 2021-07-30 DIAGNOSIS — I10 ESSENTIAL HYPERTENSION: ICD-10-CM

## 2021-07-30 DIAGNOSIS — R00.0 TACHYCARDIA: ICD-10-CM

## 2021-07-31 RX ORDER — ATENOLOL 50 MG/1
TABLET ORAL
Qty: 90 TABLET | Refills: 1 | Status: SHIPPED | OUTPATIENT
Start: 2021-07-31

## 2021-08-02 ENCOUNTER — HOSPITAL ENCOUNTER (OUTPATIENT)
Dept: ULTRASOUND IMAGING | Facility: HOSPITAL | Age: 46
Discharge: HOME | End: 2021-08-02
Attending: FAMILY MEDICINE
Payer: COMMERCIAL

## 2021-08-02 DIAGNOSIS — R79.89 ELEVATED LFTS: ICD-10-CM

## 2021-08-02 DIAGNOSIS — K76.9 LIVER LESION: Primary | ICD-10-CM

## 2021-08-02 PROCEDURE — 76705 ECHO EXAM OF ABDOMEN: CPT | Performed by: FAMILY MEDICINE

## 2021-08-11 ENCOUNTER — OFFICE VISIT (OUTPATIENT)
Dept: SURGERY | Facility: CLINIC | Age: 46
End: 2021-08-11

## 2021-08-11 VITALS
HEART RATE: 71 BPM | RESPIRATION RATE: 16 BRPM | OXYGEN SATURATION: 97 % | DIASTOLIC BLOOD PRESSURE: 91 MMHG | SYSTOLIC BLOOD PRESSURE: 148 MMHG | BODY MASS INDEX: 49.75 KG/M2 | WEIGHT: 280.81 LBS | HEIGHT: 63 IN

## 2021-08-11 DIAGNOSIS — R16.0 LIVER MASS: Primary | ICD-10-CM

## 2021-08-11 PROCEDURE — 3080F DIAST BP >= 90 MM HG: CPT | Performed by: SURGERY

## 2021-08-11 PROCEDURE — 3008F BODY MASS INDEX DOCD: CPT | Performed by: SURGERY

## 2021-08-11 PROCEDURE — 99245 OFF/OP CONSLTJ NEW/EST HI 55: CPT | Performed by: SURGERY

## 2021-08-11 PROCEDURE — 3077F SYST BP >= 140 MM HG: CPT | Performed by: SURGERY

## 2021-08-11 NOTE — CONSULTS
[facility]    Patient Name:  Rogerio Agustin   YOB: 1975   Gender:  Female   Appt Date:  8/11/2021   Provider:  Anant Torres MD   Insurance:   [address]  Referring Provider: Mar and maternal grandmother with breast cancer at age 48.  Surgical history remarkable for lap cholecystectomy      Vital Signs:  BP (!) 148/91 (BP Location: Left arm, Patient Position: Sitting, Cuff Size: large)   Pulse 71   Resp 16   Ht 1.6 m (5' 3\")   Wt 1 Years: 20.00        Pack years: 10        Types: Cigarettes      Smokeless tobacco: Never Used    Substance and Sexual Activity      Alcohol use:  Yes        Alcohol/week: 0.0 standard drinks        Comment: 2-3 drinks/month      Drug use: No      Sexual a tenderness. There is no guarding or rebound. Hernia: No hernia is present. Musculoskeletal:         General: Normal range of motion. Cervical back: Normal range of motion. Skin:     General: Skin is warm and dry.    Neurological:      Mental S arthropathy. ABDOMINAL WALL: No mass or hernia is perceived.    OTHER: No free air or fluid is seen in the abdomen or pelvis.         Dictated by (CST): Shahnaz Alvarado MD on 2/10/2018 at 8:00       Approved by (CST): Shahnaz Alvarado MD on 2/10/2018 at 8: on MRI findings. I will be calling her. John Porras MD  Complex General Surgical Oncology  Salome Nicole  Pager [phone]  [email or web address]. [email or web address]        Follow Up:  No follow-ups on file.    Pending MRI results

## 2021-08-26 ENCOUNTER — HOSPITAL ENCOUNTER (OUTPATIENT)
Dept: MRI IMAGING | Age: 46
Discharge: HOME | End: 2021-08-26
Attending: PHYSICIAN ASSISTANT
Payer: COMMERCIAL

## 2021-08-26 DIAGNOSIS — R16.0 LIVER MASS: ICD-10-CM

## 2021-08-26 LAB — CREAT BLD-MCNC: 0.6 MG/DL

## 2021-08-26 PROCEDURE — 82565 ASSAY OF CREATININE: CPT

## 2021-08-26 PROCEDURE — 74183 MRI ABD W/O CNTR FLWD CNTR: CPT | Performed by: PHYSICIAN ASSISTANT

## 2021-08-26 PROCEDURE — A9575 INJ GADOTERATE MEGLUMI 0.1ML: HCPCS | Performed by: PHYSICIAN ASSISTANT

## 2021-08-27 ENCOUNTER — TELEPHONE (OUTPATIENT)
Dept: SURGERY | Facility: CLINIC | Age: 46
End: 2021-08-27

## 2021-08-27 DIAGNOSIS — R16.0 LIVER MASS, RIGHT LOBE: Primary | ICD-10-CM

## 2021-08-27 NOTE — TELEPHONE ENCOUNTER
Called, no answer, could not leave VM  MRI most consistent with benign FNH  Follow up with me in 6 months with MRI LIVER WITH EOVIST to document stability recommended  Will try again later  Orders placed    Marti Alvarez MD  Complex General Surgical Oncol

## 2021-08-31 RX ORDER — ALBUTEROL SULFATE 90 UG/1
2 AEROSOL, METERED RESPIRATORY (INHALATION) EVERY 6 HOURS PRN
Qty: 18 G | Refills: 1 | Status: SHIPPED | OUTPATIENT
Start: 2021-08-31 | End: 2021-11-15

## 2021-08-31 RX ORDER — LISINOPRIL 20 MG/1
TABLET ORAL
Qty: 90 TABLET | Refills: 1 | Status: SHIPPED | OUTPATIENT
Start: 2021-08-31 | End: 2022-03-19

## 2021-09-10 ENCOUNTER — OFFICE VISIT (OUTPATIENT)
Dept: FAMILY MEDICINE CLINIC | Facility: CLINIC | Age: 46
End: 2021-09-10

## 2021-09-10 VITALS
WEIGHT: 280 LBS | HEIGHT: 63 IN | OXYGEN SATURATION: 98 % | SYSTOLIC BLOOD PRESSURE: 140 MMHG | HEART RATE: 98 BPM | TEMPERATURE: 100 F | RESPIRATION RATE: 16 BRPM | BODY MASS INDEX: 49.61 KG/M2 | DIASTOLIC BLOOD PRESSURE: 80 MMHG

## 2021-09-10 DIAGNOSIS — K05.10 GINGIVITIS: ICD-10-CM

## 2021-09-10 DIAGNOSIS — K13.79: Primary | ICD-10-CM

## 2021-09-10 DIAGNOSIS — H92.01 OTALGIA, RIGHT: ICD-10-CM

## 2021-09-10 DIAGNOSIS — S00.512A: Primary | ICD-10-CM

## 2021-09-10 PROCEDURE — 3008F BODY MASS INDEX DOCD: CPT | Performed by: NURSE PRACTITIONER

## 2021-09-10 PROCEDURE — 99213 OFFICE O/P EST LOW 20 MIN: CPT | Performed by: NURSE PRACTITIONER

## 2021-09-10 PROCEDURE — 3077F SYST BP >= 140 MM HG: CPT | Performed by: NURSE PRACTITIONER

## 2021-09-10 PROCEDURE — 3079F DIAST BP 80-89 MM HG: CPT | Performed by: NURSE PRACTITIONER

## 2021-09-10 RX ORDER — AMOXICILLIN AND CLAVULANATE POTASSIUM 875; 125 MG/1; MG/1
1 TABLET, FILM COATED ORAL 2 TIMES DAILY
Qty: 20 TABLET | Refills: 0 | Status: SHIPPED | OUTPATIENT
Start: 2021-09-10 | End: 2021-09-20

## 2021-09-10 NOTE — PATIENT INSTRUCTIONS
Earache, No Infection (Adult)   Earaches can happen without an infection. They can occur when air and fluid build up behind the eardrum. They may cause a feeling of fullness and discomfort. They may also impair hearing.  This is called otitis media with e prescribed. If you have chronic liver or kidney disease or ever had a stomach ulcer or GI bleeding, talk with your healthcare provider before using any medicines. · Aspirin should never be used in anyone younger than age 25 who has a fever.  It may cause s untreated, gingivitis can lead to a more severe form of periodontal disease called periodontitis. Over time, this can cause tooth loss if not treated. What causes gingivitis? Gingivitis is most often caused when plaque builds up on your teeth.  Plaque is healthcare provider before using these medicines.     When to call your healthcare provider  Call your healthcare provider if you have:  · Pain that doesn’t go away or gets worse  · Gums that have pulled away from your teeth  · A bad taste in your mouth alta

## 2021-09-10 NOTE — PROGRESS NOTES
CHIEF COMPLAINT:   Patient presents with:  Ear Pain      HPI:   Ulisses Burton is a 40year old female who presents to clinic today with complaints of R ear pain. Has had for 5  days. Pain is described as sharp.   Patient denies history of ear infections OF SYSTEMS:   GENERAL: Feeling well otherwise. SKIN: no unusual skin lesions or rashes  HEENT: See HPI  LUNGS: No cough, shortness of breath, or wheezing. GI: No N/V/C/D. NEURO: denies headaches or dizziness    EXAM:   /80   Pulse 98   Temp 100. clavulanate 875-125 MG Oral Tab 20 tablet 0     Sig: Take 1 tablet by mouth 2 (two) times daily for 10 days. Patient Instructions     Earache, No Infection (Adult)   Earaches can happen without an infection.  They can occur when air and fluid buil care of yourself:  · You may use over-the-counter medicine as directed by your healthcare provider to control pain, unless medicine was prescribed.  If you have chronic liver or kidney disease or ever had a stomach ulcer or GI bleeding, talk with your healt or slightly below the gumline. A severe infection can cause small painful sores on the gums, bad breath, and bleeding gums. If untreated, gingivitis can lead to a more severe form of periodontal disease called periodontitis.  Over time, this can cause tooth pain or fever. If you have liver or kidney disease or ever had a stomach ulcer or gastrointestinal bleeding, talk with your healthcare provider before using these medicines.     When to call your healthcare provider  Call your healthcare provider if you hav

## 2021-09-23 ENCOUNTER — LAB ENCOUNTER (OUTPATIENT)
Dept: LAB | Facility: HOSPITAL | Age: 46
End: 2021-09-23
Attending: INTERNAL MEDICINE
Payer: COMMERCIAL

## 2021-09-23 ENCOUNTER — OFFICE VISIT (OUTPATIENT)
Dept: SURGERY | Facility: CLINIC | Age: 46
End: 2021-09-23

## 2021-09-23 VITALS
HEIGHT: 63 IN | DIASTOLIC BLOOD PRESSURE: 80 MMHG | WEIGHT: 286 LBS | OXYGEN SATURATION: 98 % | SYSTOLIC BLOOD PRESSURE: 132 MMHG | HEART RATE: 86 BPM | BODY MASS INDEX: 50.68 KG/M2

## 2021-09-23 DIAGNOSIS — K76.0 FATTY LIVER: ICD-10-CM

## 2021-09-23 DIAGNOSIS — R63.5 WEIGHT GAIN: ICD-10-CM

## 2021-09-23 DIAGNOSIS — E78.5 DYSLIPIDEMIA: ICD-10-CM

## 2021-09-23 DIAGNOSIS — I10 ESSENTIAL HYPERTENSION: ICD-10-CM

## 2021-09-23 DIAGNOSIS — L30.4 INTERTRIGO: ICD-10-CM

## 2021-09-23 DIAGNOSIS — R63.2 BINGE EATING: Primary | ICD-10-CM

## 2021-09-23 DIAGNOSIS — E66.01 MORBID OBESITY WITH BMI OF 50.0-59.9, ADULT (HCC): ICD-10-CM

## 2021-09-23 DIAGNOSIS — R63.2 BINGE EATING: ICD-10-CM

## 2021-09-23 PROCEDURE — 3075F SYST BP GE 130 - 139MM HG: CPT | Performed by: INTERNAL MEDICINE

## 2021-09-23 PROCEDURE — 93010 ELECTROCARDIOGRAM REPORT: CPT | Performed by: INTERNAL MEDICINE

## 2021-09-23 PROCEDURE — 3079F DIAST BP 80-89 MM HG: CPT | Performed by: INTERNAL MEDICINE

## 2021-09-23 PROCEDURE — 99204 OFFICE O/P NEW MOD 45 MIN: CPT | Performed by: INTERNAL MEDICINE

## 2021-09-23 PROCEDURE — 3008F BODY MASS INDEX DOCD: CPT | Performed by: INTERNAL MEDICINE

## 2021-09-23 PROCEDURE — 93005 ELECTROCARDIOGRAM TRACING: CPT

## 2021-09-23 RX ORDER — HYDROCHLOROTHIAZIDE 12.5 MG/1
12.5 CAPSULE, GELATIN COATED ORAL DAILY
Qty: 30 CAPSULE | Refills: 1 | Status: SHIPPED | OUTPATIENT
Start: 2021-09-23 | End: 2021-10-18

## 2021-09-23 RX ORDER — NYSTATIN 100000 [USP'U]/G
1 POWDER TOPICAL 4 TIMES DAILY
Qty: 30 G | Refills: 1 | Status: SHIPPED | OUTPATIENT
Start: 2021-09-23

## 2021-09-23 NOTE — PROGRESS NOTES
[facility] Consultation Note       Patient:  Kvng Galvez  :      1975  MRN:      [id number]    Referring Provider: Dr. Sneha Cox       Chief Complaint:  Patient presents with:  Consult  Weight Management      SUBJECTIVE     Histo Base) MCG/ACT Inhalation Aero Soln Inhale 2 puffs into the lungs every 6 (six) hours as needed for Wheezing or Shortness of Breath.  18 g 1   • LISINOPRIL 20 MG Oral Tab TAKE 1 TABLET BY MOUTH EVERY DAY 90 tablet 1   • ATENOLOL 50 MG Oral Tab TAKE 1 TABLET Asked        Pt has a pacemaker: Not Asked        Pt has a defibrillator: No        Breast feeding: No        Reaction to local anesthetic: No    Social History Narrative      Not on file    Social Determinants of Health  Financial Resource Strain:       D Sister 25        Hodgkin's Lymphoma   • Other (Multiple Sclerosis) Father 64   • Other (Alive and well) Mother    • Breast Cancer Maternal Grandmother 48           Typical Dietary Intake:  Breakfast AM Snack Lunch PM Snack Dinner   Coffee with creamer  Col irritation noted under skin folds  Neurologic: Grossly normal    ASSESSMENT     HYPERTENSION:  The patient's blood pressure has been well controlled. she has been checking it as instructed and has remained in relatively good control.         Encounter Diag Future    Weight gain  -     EKG 12-LEAD; Future    Dyslipidemia  -     EKG 12-LEAD; Future    Fatty liver  -     EKG 12-LEAD; Future    Intertrigo  -     Nystatin 543673 UNIT/GM External Powder; Apply 1 Application topically 4 (four) times daily.  Apply to

## 2021-10-16 DIAGNOSIS — I10 ESSENTIAL HYPERTENSION: ICD-10-CM

## 2021-10-18 RX ORDER — HYDROCHLOROTHIAZIDE 12.5 MG/1
CAPSULE, GELATIN COATED ORAL
Qty: 30 CAPSULE | Refills: 1 | Status: SHIPPED | OUTPATIENT
Start: 2021-10-18 | End: 2021-11-11

## 2021-10-23 DIAGNOSIS — R63.2 BINGE EATING: ICD-10-CM

## 2021-10-25 RX ORDER — LISDEXAMFETAMINE DIMESYLATE CAPSULES 20 MG/1
20 CAPSULE ORAL DAILY
Qty: 30 CAPSULE | Refills: 0 | OUTPATIENT
Start: 2021-10-25 | End: 2021-11-24

## 2021-11-10 DIAGNOSIS — I10 ESSENTIAL HYPERTENSION: ICD-10-CM

## 2021-11-11 RX ORDER — HYDROCHLOROTHIAZIDE 12.5 MG/1
CAPSULE, GELATIN COATED ORAL
Qty: 30 CAPSULE | Refills: 1 | Status: SHIPPED | OUTPATIENT
Start: 2021-11-11 | End: 2022-01-12

## 2021-11-15 ENCOUNTER — OFFICE VISIT (OUTPATIENT)
Dept: PODIATRY CLINIC | Facility: CLINIC | Age: 46
End: 2021-11-15

## 2021-11-15 VITALS
WEIGHT: 265 LBS | HEIGHT: 62 IN | SYSTOLIC BLOOD PRESSURE: 128 MMHG | BODY MASS INDEX: 48.76 KG/M2 | DIASTOLIC BLOOD PRESSURE: 88 MMHG | HEART RATE: 88 BPM

## 2021-11-15 DIAGNOSIS — M72.2 PLANTAR FASCIITIS OF LEFT FOOT: Primary | ICD-10-CM

## 2021-11-15 DIAGNOSIS — M79.672 PAIN OF LEFT HEEL: ICD-10-CM

## 2021-11-15 DIAGNOSIS — M21.6X2 ACQUIRED EQUINUS DEFORMITY OF LEFT FOOT: ICD-10-CM

## 2021-11-15 PROCEDURE — 3079F DIAST BP 80-89 MM HG: CPT | Performed by: PODIATRIST

## 2021-11-15 PROCEDURE — 3074F SYST BP LT 130 MM HG: CPT | Performed by: PODIATRIST

## 2021-11-15 PROCEDURE — 20550 NJX 1 TENDON SHEATH/LIGAMENT: CPT | Performed by: PODIATRIST

## 2021-11-15 PROCEDURE — 3008F BODY MASS INDEX DOCD: CPT | Performed by: PODIATRIST

## 2021-11-15 RX ORDER — ALBUTEROL SULFATE 90 UG/1
2 AEROSOL, METERED RESPIRATORY (INHALATION) EVERY 6 HOURS PRN
Qty: 18 G | Refills: 1 | Status: SHIPPED | OUTPATIENT
Start: 2021-11-15 | End: 2022-01-20

## 2021-11-15 RX ORDER — TRIAMCINOLONE ACETONIDE 40 MG/ML
40 INJECTION, SUSPENSION INTRA-ARTICULAR; INTRAMUSCULAR ONCE
Status: COMPLETED | OUTPATIENT
Start: 2021-11-15 | End: 2021-11-15

## 2021-11-15 RX ORDER — BUDESONIDE AND FORMOTEROL FUMARATE DIHYDRATE 160; 4.5 UG/1; UG/1
2 AEROSOL RESPIRATORY (INHALATION) 2 TIMES DAILY
Qty: 10.2 G | Refills: 2 | Status: SHIPPED | OUTPATIENT
Start: 2021-11-15 | End: 2022-02-13

## 2021-11-15 RX ADMIN — TRIAMCINOLONE ACETONIDE 40 MG: 40 INJECTION, SUSPENSION INTRA-ARTICULAR; INTRAMUSCULAR at 16:05:00

## 2021-11-15 NOTE — PROCEDURES
Per Dr. Jigna Sanchez verbal instruction, draw up 1 mL Kenalog-40 and 1 mL Marcaine 0.5% for left foot injection. Patient had left prior to obtaining post-injection vitals.

## 2021-11-15 NOTE — PROGRESS NOTES
[facility], [facility] Podiatry  Progress Note    Felicita Sarmiento is a 40year old female. Patient presents with:   Foot Pain: Pt here for f/u on plantar fascitis, injection last visit did very well for 4 mo, pain today 4/10        HPI:     This is a pleasant fema Procedure: COLONOSCOPY;  Surgeon: Chandana Lehman MD;  Location: [address] [place] [address] ENDOSCOPY   • CYST ASPIRATION LEFT     • KNEE ARTHROSCOPY  1994      Family History   Problem Relation Age of Onset   • Cancer Sister 25        Hodgkin's Lymphoma   • Other ( Plantar fascial insertion site  Decreased left ankle DF with knee extended approx 0 degrees with increased ankle DF with knee extended.       LABS & IMAGING:     Lab Results   Component Value Date    GLU 89 07/02/2021    BUN 17 07/02/2021    CREATSERUM 0.73 symptoms. Will proceed with left 2nd PF injection  PROCEDURE: 93400- left foot  After reviewing options for cortisone injection and risks/potential complications, pt has agreed to proceed.   Using aseptic technique, injection was then given using 1cc 40m

## 2021-11-22 ENCOUNTER — OFFICE VISIT (OUTPATIENT)
Dept: SURGERY | Facility: CLINIC | Age: 46
End: 2021-11-22

## 2021-11-22 VITALS
OXYGEN SATURATION: 95 % | DIASTOLIC BLOOD PRESSURE: 90 MMHG | HEART RATE: 90 BPM | HEIGHT: 63 IN | SYSTOLIC BLOOD PRESSURE: 135 MMHG | WEIGHT: 267 LBS | BODY MASS INDEX: 47.31 KG/M2

## 2021-11-22 DIAGNOSIS — R63.2 BINGE EATING: ICD-10-CM

## 2021-11-22 DIAGNOSIS — E78.5 DYSLIPIDEMIA: ICD-10-CM

## 2021-11-22 DIAGNOSIS — I10 ESSENTIAL HYPERTENSION: Primary | ICD-10-CM

## 2021-11-22 DIAGNOSIS — E66.01 MORBID OBESITY WITH BMI OF 45.0-49.9, ADULT (HCC): ICD-10-CM

## 2021-11-22 DIAGNOSIS — F43.9 STRESS: ICD-10-CM

## 2021-11-22 DIAGNOSIS — K76.0 FATTY LIVER: ICD-10-CM

## 2021-11-22 PROCEDURE — 3080F DIAST BP >= 90 MM HG: CPT | Performed by: INTERNAL MEDICINE

## 2021-11-22 PROCEDURE — 3075F SYST BP GE 130 - 139MM HG: CPT | Performed by: INTERNAL MEDICINE

## 2021-11-22 PROCEDURE — 3008F BODY MASS INDEX DOCD: CPT | Performed by: INTERNAL MEDICINE

## 2021-11-22 PROCEDURE — 99214 OFFICE O/P EST MOD 30 MIN: CPT | Performed by: INTERNAL MEDICINE

## 2021-11-22 NOTE — PROGRESS NOTES
[address], [facility]  [address] [facility] [address]  Dept: [phone]       Patient:  Riana Mail  :      1975  MRN:      [id number]    Chief Complaint:  Patient presen 1   • LISINOPRIL 20 MG Oral Tab TAKE 1 TABLET BY MOUTH EVERY DAY 90 tablet 1   • ATENOLOL 50 MG Oral Tab TAKE 1 TABLET BY MOUTH EVERY DAY 90 tablet 1   • levonorgestrel 20 MCG/24HR Intrauterine IUD Use as directed  Mirena Lot [id number]   11-10     • [START ON Determinants of Health  Financial Resource Strain: Not on file  Food Insecurity: Not on file  Transportation Needs: Not on file  Physical Activity: Not on file  Stress: Not on file  Social Connections: Not on file  Intimate Partner Violence: Not on file  H Identify triggers for eating and manage cues and Eat slowly and take 20 to 30 minutes to complete each meal    Exercise Goals Reviewed and Discussed    Needs to keep moving    ROS:    Constitutional: negative  Respiratory: negative  Cardiovascular: negativ plan and medication. Liver function stable.     Lab Results   Component Value Date/Time    CHOLEST 209 (H) 07/02/2021 10:55 AM     (H) 07/02/2021 10:55 AM    HDL 45 07/02/2021 10:55 AM    TRIG 155 (H) 07/02/2021 10:55 AM    VLDL 28 07/02/2021 10:55 A

## 2021-12-08 DIAGNOSIS — I10 ESSENTIAL HYPERTENSION: ICD-10-CM

## 2021-12-08 RX ORDER — HYDROCHLOROTHIAZIDE 12.5 MG/1
CAPSULE, GELATIN COATED ORAL
Qty: 30 CAPSULE | Refills: 1 | OUTPATIENT
Start: 2021-12-08

## 2021-12-15 DIAGNOSIS — F43.9 STRESS: ICD-10-CM

## 2021-12-15 DIAGNOSIS — R63.2 BINGE EATING: ICD-10-CM

## 2021-12-22 ENCOUNTER — OFFICE VISIT (OUTPATIENT)
Dept: INTERNAL MEDICINE CLINIC | Facility: CLINIC | Age: 46
End: 2021-12-22

## 2021-12-22 VITALS
WEIGHT: 265 LBS | SYSTOLIC BLOOD PRESSURE: 136 MMHG | DIASTOLIC BLOOD PRESSURE: 86 MMHG | HEIGHT: 63 IN | BODY MASS INDEX: 46.95 KG/M2

## 2021-12-22 DIAGNOSIS — Z23 NEED FOR INFLUENZA VACCINATION: ICD-10-CM

## 2021-12-22 DIAGNOSIS — L98.9 SKIN LESION: Primary | ICD-10-CM

## 2021-12-22 PROCEDURE — 3079F DIAST BP 80-89 MM HG: CPT | Performed by: FAMILY MEDICINE

## 2021-12-22 PROCEDURE — 90472 IMMUNIZATION ADMIN EACH ADD: CPT | Performed by: FAMILY MEDICINE

## 2021-12-22 PROCEDURE — 90686 IIV4 VACC NO PRSV 0.5 ML IM: CPT | Performed by: FAMILY MEDICINE

## 2021-12-22 PROCEDURE — 99213 OFFICE O/P EST LOW 20 MIN: CPT | Performed by: FAMILY MEDICINE

## 2021-12-22 PROCEDURE — 90732 PPSV23 VACC 2 YRS+ SUBQ/IM: CPT | Performed by: FAMILY MEDICINE

## 2021-12-22 PROCEDURE — 3075F SYST BP GE 130 - 139MM HG: CPT | Performed by: FAMILY MEDICINE

## 2021-12-22 PROCEDURE — 90471 IMMUNIZATION ADMIN: CPT | Performed by: FAMILY MEDICINE

## 2021-12-22 PROCEDURE — 3008F BODY MASS INDEX DOCD: CPT | Performed by: FAMILY MEDICINE

## 2021-12-22 NOTE — PROGRESS NOTES
PATIENT IDENTIFICATION  Name: Kj Rivera  MRN: [id number]    Diagnoses:   Skin lesion  (primary encounter diagnosis)  Need for influenza vaccination    SUBJECTIVE:  Kj Rivera is a 40year old female who presents for skin lesion.       Skin lesion Dimesylate (VYVANSE) 20 MG Oral Cap Take 1 capsule (20 mg total) by mouth daily. 30 capsule 0   • albuterol 108 (90 Base) MCG/ACT Inhalation Aero Soln Inhale 2 puffs into the lungs every 6 (six) hours as needed for Wheezing or Shortness of Breath.  18 g 1

## 2021-12-27 ENCOUNTER — TELEPHONE (OUTPATIENT)
Dept: OBGYN CLINIC | Facility: CLINIC | Age: 46
End: 2021-12-27

## 2021-12-27 NOTE — TELEPHONE ENCOUNTER
Patient notified okay to keep appt, she confirms she is not having symptoms. If she develops symptoms before appt, she will let us know.

## 2021-12-27 NOTE — TELEPHONE ENCOUNTER
Patient's son is showing covid symptoms. His test results are still pending. Patient would like to know if her appointment tomorrow needs to be rescheduled. She is vaccinated and boosted. Please advise.

## 2022-01-12 DIAGNOSIS — I10 ESSENTIAL HYPERTENSION: ICD-10-CM

## 2022-01-12 RX ORDER — HYDROCHLOROTHIAZIDE 12.5 MG/1
CAPSULE, GELATIN COATED ORAL
Qty: 30 CAPSULE | Refills: 1 | Status: SHIPPED | OUTPATIENT
Start: 2022-01-12

## 2022-01-18 DIAGNOSIS — R63.2 BINGE EATING: ICD-10-CM

## 2022-01-18 DIAGNOSIS — F43.9 STRESS: ICD-10-CM

## 2022-01-19 NOTE — TELEPHONE ENCOUNTER
Requested Prescriptions     Pending Prescriptions Disp Refills   • VENTOLIN  (90 Base) MCG/ACT Inhalation Aero Soln [Pharmacy Med Name: VENTOLIN HFA 90 MCG INHALER] 18 each 1     Sig: INHALE 2 PUFFS BY MOUTH EVERY 6 HOURS AS NEEDED FOR WHEEZE OR NANCY

## 2022-01-31 ENCOUNTER — PATIENT MESSAGE (OUTPATIENT)
Dept: INTERNAL MEDICINE CLINIC | Facility: CLINIC | Age: 47
End: 2022-01-31

## 2022-02-12 ENCOUNTER — HOSPITAL ENCOUNTER (OUTPATIENT)
Dept: MRI IMAGING | Facility: HOSPITAL | Age: 47
Discharge: HOME | End: 2022-02-12
Attending: SURGERY
Payer: COMMERCIAL

## 2022-02-12 DIAGNOSIS — R16.0 LIVER MASS, RIGHT LOBE: ICD-10-CM

## 2022-02-12 LAB — CREAT BLD-MCNC: 0.7 MG/DL

## 2022-02-12 PROCEDURE — 74183 MRI ABD W/O CNTR FLWD CNTR: CPT | Performed by: SURGERY

## 2022-02-12 PROCEDURE — A9581 GADOXETATE DISODIUM INJ: HCPCS | Performed by: SURGERY

## 2022-02-12 PROCEDURE — 82565 ASSAY OF CREATININE: CPT

## 2022-02-14 ENCOUNTER — OFFICE VISIT (OUTPATIENT)
Dept: SURGERY | Facility: CLINIC | Age: 47
End: 2022-02-14
Payer: COMMERCIAL

## 2022-02-14 VITALS
HEART RATE: 71 BPM | BODY MASS INDEX: 46.42 KG/M2 | WEIGHT: 262 LBS | HEIGHT: 63 IN | OXYGEN SATURATION: 97 % | SYSTOLIC BLOOD PRESSURE: 132 MMHG | DIASTOLIC BLOOD PRESSURE: 70 MMHG

## 2022-02-14 DIAGNOSIS — R63.2 BINGE EATING: ICD-10-CM

## 2022-02-14 DIAGNOSIS — F43.9 STRESS: ICD-10-CM

## 2022-02-14 DIAGNOSIS — E78.5 DYSLIPIDEMIA: ICD-10-CM

## 2022-02-14 DIAGNOSIS — I10 ESSENTIAL HYPERTENSION: Primary | ICD-10-CM

## 2022-02-14 DIAGNOSIS — E66.01 MORBID OBESITY WITH BMI OF 45.0-49.9, ADULT (HCC): ICD-10-CM

## 2022-02-14 DIAGNOSIS — K76.0 FATTY LIVER: ICD-10-CM

## 2022-02-14 PROCEDURE — 3008F BODY MASS INDEX DOCD: CPT | Performed by: INTERNAL MEDICINE

## 2022-02-14 PROCEDURE — 3075F SYST BP GE 130 - 139MM HG: CPT | Performed by: INTERNAL MEDICINE

## 2022-02-14 PROCEDURE — 99214 OFFICE O/P EST MOD 30 MIN: CPT | Performed by: INTERNAL MEDICINE

## 2022-02-14 PROCEDURE — 3078F DIAST BP <80 MM HG: CPT | Performed by: INTERNAL MEDICINE

## 2022-02-14 RX ORDER — DIETHYLPROPION HYDROCHLORIDE 75 MG/1
1 TABLET ORAL DAILY
Qty: 30 TABLET | Refills: 2 | Status: SHIPPED | OUTPATIENT
Start: 2022-02-14 | End: 2022-03-16

## 2022-02-14 RX ORDER — SERTRALINE HYDROCHLORIDE 100 MG/1
100 TABLET, FILM COATED ORAL EVERY EVENING
Qty: 90 TABLET | Refills: 2 | Status: SHIPPED | OUTPATIENT
Start: 2022-02-14 | End: 2022-05-15

## 2022-02-14 RX ORDER — HYDROCHLOROTHIAZIDE 12.5 MG/1
CAPSULE, GELATIN COATED ORAL
Qty: 30 CAPSULE | Refills: 1 | Status: SHIPPED | OUTPATIENT
Start: 2022-02-14 | End: 2022-03-10

## 2022-02-15 ENCOUNTER — TELEPHONE (OUTPATIENT)
Dept: SURGERY | Facility: CLINIC | Age: 47
End: 2022-02-15

## 2022-02-15 NOTE — TELEPHONE ENCOUNTER
LVM to go over MRI results. Callback number given.      Jonathan Oconnor PA-C    Department of Surgical Oncology  [place] [address]  [phone]  Pager: [phone]

## 2022-02-15 NOTE — TELEPHONE ENCOUNTER
Called and LVM    Anna Souza MD  Complex General Surgical Oncology  [facility]  Pager [phone]  [email or web address]

## 2022-02-23 ENCOUNTER — OFFICE VISIT (OUTPATIENT)
Dept: SURGERY | Facility: CLINIC | Age: 47
End: 2022-02-23
Payer: COMMERCIAL

## 2022-02-23 VITALS
SYSTOLIC BLOOD PRESSURE: 135 MMHG | HEART RATE: 76 BPM | BODY MASS INDEX: 47 KG/M2 | WEIGHT: 263.38 LBS | TEMPERATURE: 97 F | RESPIRATION RATE: 20 BRPM | OXYGEN SATURATION: 97 % | DIASTOLIC BLOOD PRESSURE: 83 MMHG

## 2022-02-23 DIAGNOSIS — R16.0 LIVER MASS, RIGHT LOBE: Primary | ICD-10-CM

## 2022-02-23 PROCEDURE — 3075F SYST BP GE 130 - 139MM HG: CPT | Performed by: SURGERY

## 2022-02-23 PROCEDURE — 99215 OFFICE O/P EST HI 40 MIN: CPT | Performed by: SURGERY

## 2022-02-23 PROCEDURE — 3079F DIAST BP 80-89 MM HG: CPT | Performed by: SURGERY

## 2022-03-10 RX ORDER — HYDROCHLOROTHIAZIDE 12.5 MG/1
CAPSULE, GELATIN COATED ORAL
Qty: 30 CAPSULE | Refills: 1 | Status: SHIPPED | OUTPATIENT
Start: 2022-03-10

## 2022-03-19 RX ORDER — LISINOPRIL 20 MG/1
TABLET ORAL
Qty: 90 TABLET | Refills: 1 | Status: SHIPPED | OUTPATIENT
Start: 2022-03-19

## 2022-04-05 RX ORDER — BUDESONIDE AND FORMOTEROL FUMARATE DIHYDRATE 160; 4.5 UG/1; UG/1
2 AEROSOL RESPIRATORY (INHALATION) 2 TIMES DAILY
Qty: 30.6 EACH | Refills: 1 | Status: SHIPPED | OUTPATIENT
Start: 2022-04-05 | End: 2022-09-29

## 2022-04-05 RX ORDER — NYSTATIN 100000 [USP'U]/G
1 POWDER TOPICAL 4 TIMES DAILY
Qty: 30 G | Refills: 1 | Status: SHIPPED | OUTPATIENT
Start: 2022-04-05

## 2022-04-05 RX ORDER — ALBUTEROL SULFATE 90 UG/1
2 AEROSOL, METERED RESPIRATORY (INHALATION) EVERY 6 HOURS PRN
Qty: 18 EACH | Refills: 1 | Status: SHIPPED | OUTPATIENT
Start: 2022-04-05 | End: 2022-09-29

## 2022-04-06 RX ORDER — HYDROCHLOROTHIAZIDE 12.5 MG/1
CAPSULE, GELATIN COATED ORAL
Qty: 30 CAPSULE | Refills: 1 | Status: SHIPPED | OUTPATIENT
Start: 2022-04-06

## 2022-04-09 ENCOUNTER — OFFICE VISIT (OUTPATIENT)
Dept: FAMILY MEDICINE CLINIC | Facility: CLINIC | Age: 47
End: 2022-04-09
Payer: COMMERCIAL

## 2022-04-09 VITALS
HEART RATE: 92 BPM | OXYGEN SATURATION: 98 % | RESPIRATION RATE: 14 BRPM | SYSTOLIC BLOOD PRESSURE: 110 MMHG | DIASTOLIC BLOOD PRESSURE: 80 MMHG | TEMPERATURE: 97 F | WEIGHT: 260 LBS | BODY MASS INDEX: 46.07 KG/M2 | HEIGHT: 63 IN

## 2022-04-09 DIAGNOSIS — R30.0 DYSURIA: Primary | ICD-10-CM

## 2022-04-09 LAB
GLUCOSE (URINE DIPSTICK): NEGATIVE MG/DL
KETONES (URINE DIPSTICK): NEGATIVE MG/DL
MULTISTIX LOT#: ABNORMAL NUMERIC
NITRITE, URINE: POSITIVE
PH, URINE: 5 (ref 4.5–8)
PROTEIN (URINE DIPSTICK): >=300 MG/DL
SPECIFIC GRAVITY: 1.03 (ref 1–1.03)
URINE-COLOR: YELLOW
UROBILINOGEN,SEMI-QN: 0.2 MG/DL (ref 0–1.9)

## 2022-04-09 PROCEDURE — 3008F BODY MASS INDEX DOCD: CPT | Performed by: NURSE PRACTITIONER

## 2022-04-09 PROCEDURE — 81003 URINALYSIS AUTO W/O SCOPE: CPT | Performed by: NURSE PRACTITIONER

## 2022-04-09 PROCEDURE — 99212 OFFICE O/P EST SF 10 MIN: CPT | Performed by: NURSE PRACTITIONER

## 2022-04-09 PROCEDURE — 87088 URINE BACTERIA CULTURE: CPT | Performed by: NURSE PRACTITIONER

## 2022-04-09 PROCEDURE — 3074F SYST BP LT 130 MM HG: CPT | Performed by: NURSE PRACTITIONER

## 2022-04-09 PROCEDURE — 87086 URINE CULTURE/COLONY COUNT: CPT | Performed by: NURSE PRACTITIONER

## 2022-04-09 PROCEDURE — 87186 SC STD MICRODIL/AGAR DIL: CPT | Performed by: NURSE PRACTITIONER

## 2022-04-09 PROCEDURE — 3079F DIAST BP 80-89 MM HG: CPT | Performed by: NURSE PRACTITIONER

## 2022-04-09 RX ORDER — NITROFURANTOIN 25; 75 MG/1; MG/1
100 CAPSULE ORAL 2 TIMES DAILY
Qty: 10 CAPSULE | Refills: 0 | Status: SHIPPED | OUTPATIENT
Start: 2022-04-09 | End: 2022-04-14

## 2022-04-09 RX ORDER — DIETHYLPROPION HYDROCHLORIDE 75 MG/1
1 TABLET ORAL DAILY
COMMUNITY
Start: 2022-03-17

## 2022-04-29 RX ORDER — HYDROCHLOROTHIAZIDE 12.5 MG/1
CAPSULE, GELATIN COATED ORAL
Qty: 30 CAPSULE | Refills: 1 | Status: SHIPPED | OUTPATIENT
Start: 2022-04-29

## 2022-05-06 RX ORDER — ATENOLOL 50 MG/1
50 TABLET ORAL DAILY
Qty: 90 TABLET | Refills: 1 | OUTPATIENT
Start: 2022-05-06

## 2022-05-06 NOTE — TELEPHONE ENCOUNTER
Called and left vm regarding BP meds. Informed pt to call and let us know if she taking BP meds in the last 6 months consistently, if not needs to make an appointment for BP f/u.

## 2022-05-09 RX ORDER — ATENOLOL 50 MG/1
50 TABLET ORAL DAILY
Qty: 90 TABLET | Refills: 0 | Status: SHIPPED | OUTPATIENT
Start: 2022-05-09

## 2022-05-16 ENCOUNTER — OFFICE VISIT (OUTPATIENT)
Dept: SURGERY | Facility: CLINIC | Age: 47
End: 2022-05-16
Payer: COMMERCIAL

## 2022-05-16 VITALS
WEIGHT: 253 LBS | SYSTOLIC BLOOD PRESSURE: 112 MMHG | BODY MASS INDEX: 44.83 KG/M2 | OXYGEN SATURATION: 92 % | HEIGHT: 63 IN | DIASTOLIC BLOOD PRESSURE: 80 MMHG | HEART RATE: 90 BPM

## 2022-05-16 DIAGNOSIS — F43.9 STRESS: ICD-10-CM

## 2022-05-16 DIAGNOSIS — K76.0 FATTY LIVER: ICD-10-CM

## 2022-05-16 DIAGNOSIS — L30.4 INTERTRIGO: ICD-10-CM

## 2022-05-16 DIAGNOSIS — R63.2 BINGE EATING: ICD-10-CM

## 2022-05-16 DIAGNOSIS — I10 ESSENTIAL HYPERTENSION: Primary | ICD-10-CM

## 2022-05-16 DIAGNOSIS — E66.01 MORBID OBESITY WITH BMI OF 40.0-44.9, ADULT (HCC): ICD-10-CM

## 2022-05-16 PROCEDURE — 3079F DIAST BP 80-89 MM HG: CPT | Performed by: INTERNAL MEDICINE

## 2022-05-16 PROCEDURE — 3074F SYST BP LT 130 MM HG: CPT | Performed by: INTERNAL MEDICINE

## 2022-05-16 PROCEDURE — 99214 OFFICE O/P EST MOD 30 MIN: CPT | Performed by: INTERNAL MEDICINE

## 2022-05-16 PROCEDURE — 3008F BODY MASS INDEX DOCD: CPT | Performed by: INTERNAL MEDICINE

## 2022-05-16 RX ORDER — SERTRALINE HYDROCHLORIDE 100 MG/1
150 TABLET, FILM COATED ORAL EVERY EVENING
Qty: 135 TABLET | Refills: 2 | Status: SHIPPED | OUTPATIENT
Start: 2022-05-16 | End: 2022-08-14

## 2022-05-16 RX ORDER — TOPIRAMATE 25 MG/1
25 TABLET ORAL EVERY EVENING
Qty: 30 TABLET | Refills: 2 | Status: SHIPPED | OUTPATIENT
Start: 2022-05-16

## 2022-05-16 RX ORDER — DIETHYLPROPION HYDROCHLORIDE 75 MG/1
1 TABLET ORAL DAILY
Qty: 30 TABLET | Refills: 2 | Status: SHIPPED | OUTPATIENT
Start: 2022-05-16

## 2022-06-01 DIAGNOSIS — I10 ESSENTIAL HYPERTENSION: ICD-10-CM

## 2022-06-01 RX ORDER — HYDROCHLOROTHIAZIDE 12.5 MG/1
CAPSULE, GELATIN COATED ORAL
Qty: 30 CAPSULE | Refills: 1 | Status: SHIPPED | OUTPATIENT
Start: 2022-06-01

## 2022-07-03 DIAGNOSIS — I10 ESSENTIAL HYPERTENSION: ICD-10-CM

## 2022-07-11 RX ORDER — HYDROCHLOROTHIAZIDE 12.5 MG/1
CAPSULE, GELATIN COATED ORAL
Qty: 30 CAPSULE | Refills: 1 | Status: SHIPPED | OUTPATIENT
Start: 2022-07-11

## 2022-08-22 RX ORDER — TOPIRAMATE 25 MG/1
TABLET ORAL
Qty: 90 TABLET | Refills: 0 | Status: SHIPPED | OUTPATIENT
Start: 2022-08-22

## 2022-08-23 ENCOUNTER — HOSPITAL ENCOUNTER (OUTPATIENT)
Dept: MRI IMAGING | Facility: HOSPITAL | Age: 47
Discharge: HOME | End: 2022-08-23
Attending: PHYSICIAN ASSISTANT
Payer: COMMERCIAL

## 2022-08-23 DIAGNOSIS — R16.0 LIVER MASS, RIGHT LOBE: ICD-10-CM

## 2022-08-23 LAB
CREAT BLD-MCNC: 0.9 MG/DL
GFR SERPLBLD BASED ON 1.73 SQ M-ARVRAT: 80 ML/MIN/1.73M2 (ref 60–?)

## 2022-08-23 PROCEDURE — A9575 INJ GADOTERATE MEGLUMI 0.1ML: HCPCS | Performed by: PHYSICIAN ASSISTANT

## 2022-08-23 PROCEDURE — 74183 MRI ABD W/O CNTR FLWD CNTR: CPT | Performed by: PHYSICIAN ASSISTANT

## 2022-08-23 PROCEDURE — 82565 ASSAY OF CREATININE: CPT

## 2022-08-24 ENCOUNTER — OFFICE VISIT (OUTPATIENT)
Dept: SURGERY | Facility: CLINIC | Age: 47
End: 2022-08-24
Payer: COMMERCIAL

## 2022-08-24 VITALS
HEIGHT: 63 IN | RESPIRATION RATE: 16 BRPM | TEMPERATURE: 98 F | DIASTOLIC BLOOD PRESSURE: 80 MMHG | BODY MASS INDEX: 43.41 KG/M2 | HEART RATE: 82 BPM | OXYGEN SATURATION: 98 % | SYSTOLIC BLOOD PRESSURE: 118 MMHG | WEIGHT: 245 LBS

## 2022-08-24 DIAGNOSIS — R16.0 LIVER MASS, RIGHT LOBE: Primary | ICD-10-CM

## 2022-08-24 PROCEDURE — 99215 OFFICE O/P EST HI 40 MIN: CPT | Performed by: SURGERY

## 2022-08-24 PROCEDURE — 3079F DIAST BP 80-89 MM HG: CPT | Performed by: SURGERY

## 2022-08-24 PROCEDURE — 3074F SYST BP LT 130 MM HG: CPT | Performed by: SURGERY

## 2022-08-24 PROCEDURE — 3008F BODY MASS INDEX DOCD: CPT | Performed by: SURGERY

## 2022-09-07 RX ORDER — DIETHYLPROPION HYDROCHLORIDE 75 MG/1
TABLET ORAL
Qty: 30 TABLET | Refills: 2 | Status: SHIPPED | OUTPATIENT
Start: 2022-09-07

## 2022-09-15 ENCOUNTER — OFFICE VISIT (OUTPATIENT)
Dept: SURGERY | Facility: CLINIC | Age: 47
End: 2022-09-15
Payer: COMMERCIAL

## 2022-09-15 VITALS
SYSTOLIC BLOOD PRESSURE: 115 MMHG | HEART RATE: 88 BPM | BODY MASS INDEX: 44.26 KG/M2 | HEIGHT: 63 IN | DIASTOLIC BLOOD PRESSURE: 73 MMHG | WEIGHT: 249.81 LBS | OXYGEN SATURATION: 97 %

## 2022-09-15 DIAGNOSIS — L30.4 INTERTRIGO: ICD-10-CM

## 2022-09-15 DIAGNOSIS — F43.9 STRESS: ICD-10-CM

## 2022-09-15 DIAGNOSIS — E66.01 MORBID OBESITY WITH BMI OF 40.0-44.9, ADULT (HCC): ICD-10-CM

## 2022-09-15 DIAGNOSIS — I10 ESSENTIAL HYPERTENSION: Primary | ICD-10-CM

## 2022-09-15 DIAGNOSIS — K76.0 FATTY LIVER: ICD-10-CM

## 2022-09-15 DIAGNOSIS — R63.2 BINGE EATING: ICD-10-CM

## 2022-09-15 PROCEDURE — 3008F BODY MASS INDEX DOCD: CPT | Performed by: INTERNAL MEDICINE

## 2022-09-15 PROCEDURE — 3078F DIAST BP <80 MM HG: CPT | Performed by: INTERNAL MEDICINE

## 2022-09-15 PROCEDURE — 3074F SYST BP LT 130 MM HG: CPT | Performed by: INTERNAL MEDICINE

## 2022-09-15 PROCEDURE — 99214 OFFICE O/P EST MOD 30 MIN: CPT | Performed by: INTERNAL MEDICINE

## 2022-09-15 RX ORDER — PHENTERMINE HYDROCHLORIDE 30 MG/1
30 CAPSULE ORAL EVERY MORNING
Qty: 30 CAPSULE | Refills: 2 | Status: SHIPPED | OUTPATIENT
Start: 2022-09-15

## 2022-09-15 RX ORDER — SERTRALINE HYDROCHLORIDE 100 MG/1
200 TABLET, FILM COATED ORAL
Qty: 180 TABLET | Refills: 1 | Status: SHIPPED | OUTPATIENT
Start: 2022-09-15 | End: 2022-12-14

## 2022-09-29 ENCOUNTER — OFFICE VISIT (OUTPATIENT)
Dept: FAMILY MEDICINE CLINIC | Facility: CLINIC | Age: 47
End: 2022-09-29

## 2022-09-29 VITALS
DIASTOLIC BLOOD PRESSURE: 69 MMHG | HEART RATE: 73 BPM | TEMPERATURE: 97 F | SYSTOLIC BLOOD PRESSURE: 104 MMHG | WEIGHT: 250 LBS | HEIGHT: 63 IN | BODY MASS INDEX: 44.3 KG/M2

## 2022-09-29 DIAGNOSIS — E66.01 MORBID OBESITY WITH BMI OF 40.0-44.9, ADULT (HCC): ICD-10-CM

## 2022-09-29 DIAGNOSIS — Z01.419 ENCOUNTER FOR GYNECOLOGICAL EXAMINATION: ICD-10-CM

## 2022-09-29 DIAGNOSIS — R74.8 ELEVATED ALKALINE PHOSPHATASE LEVEL: ICD-10-CM

## 2022-09-29 DIAGNOSIS — F17.200 SMOKER: ICD-10-CM

## 2022-09-29 DIAGNOSIS — R00.0 TACHYCARDIA: ICD-10-CM

## 2022-09-29 DIAGNOSIS — J45.30 MILD PERSISTENT ASTHMA WITHOUT COMPLICATION: ICD-10-CM

## 2022-09-29 DIAGNOSIS — K76.0 FATTY LIVER: ICD-10-CM

## 2022-09-29 DIAGNOSIS — Z12.31 ENCOUNTER FOR SCREENING MAMMOGRAM FOR BREAST CANCER: ICD-10-CM

## 2022-09-29 DIAGNOSIS — R74.01 ELEVATED ALT MEASUREMENT: ICD-10-CM

## 2022-09-29 DIAGNOSIS — L83 ACANTHOSIS NIGRICANS, ACQUIRED: ICD-10-CM

## 2022-09-29 DIAGNOSIS — R16.0 LIVER MASS, RIGHT LOBE: ICD-10-CM

## 2022-09-29 DIAGNOSIS — Z97.5 IUD CONTRACEPTION: ICD-10-CM

## 2022-09-29 DIAGNOSIS — Z23 NEED FOR INFLUENZA VACCINATION: ICD-10-CM

## 2022-09-29 DIAGNOSIS — I10 ESSENTIAL HYPERTENSION: ICD-10-CM

## 2022-09-29 DIAGNOSIS — Z23 NEED FOR TDAP VACCINATION: ICD-10-CM

## 2022-09-29 DIAGNOSIS — R60.0 BILATERAL LEG EDEMA: ICD-10-CM

## 2022-09-29 DIAGNOSIS — D12.6 ADENOMATOUS POLYP OF COLON, UNSPECIFIED PART OF COLON: ICD-10-CM

## 2022-09-29 DIAGNOSIS — Z00.00 WELL ADULT EXAM: Primary | ICD-10-CM

## 2022-09-29 PROCEDURE — 90472 IMMUNIZATION ADMIN EACH ADD: CPT | Performed by: FAMILY MEDICINE

## 2022-09-29 PROCEDURE — 90686 IIV4 VACC NO PRSV 0.5 ML IM: CPT | Performed by: FAMILY MEDICINE

## 2022-09-29 PROCEDURE — 90715 TDAP VACCINE 7 YRS/> IM: CPT | Performed by: FAMILY MEDICINE

## 2022-09-29 PROCEDURE — 90471 IMMUNIZATION ADMIN: CPT | Performed by: FAMILY MEDICINE

## 2022-09-29 PROCEDURE — 99386 PREV VISIT NEW AGE 40-64: CPT | Performed by: FAMILY MEDICINE

## 2022-09-29 RX ORDER — ATENOLOL 50 MG/1
50 TABLET ORAL DAILY
Qty: 90 TABLET | Refills: 3 | Status: SHIPPED | OUTPATIENT
Start: 2022-09-29

## 2022-09-29 RX ORDER — BUDESONIDE AND FORMOTEROL FUMARATE DIHYDRATE 160; 4.5 UG/1; UG/1
2 AEROSOL RESPIRATORY (INHALATION) 2 TIMES DAILY
Qty: 30.6 EACH | Refills: 3 | Status: SHIPPED | OUTPATIENT
Start: 2022-09-29

## 2022-09-29 RX ORDER — ALBUTEROL SULFATE 90 UG/1
2 AEROSOL, METERED RESPIRATORY (INHALATION) EVERY 6 HOURS PRN
Qty: 18 EACH | Refills: 3 | Status: SHIPPED | OUTPATIENT
Start: 2022-09-29

## 2022-10-06 DIAGNOSIS — I10 ESSENTIAL HYPERTENSION: ICD-10-CM

## 2022-10-06 RX ORDER — HYDROCHLOROTHIAZIDE 12.5 MG/1
CAPSULE, GELATIN COATED ORAL
Qty: 30 CAPSULE | Refills: 1 | Status: SHIPPED | OUTPATIENT
Start: 2022-10-06

## 2022-10-10 ENCOUNTER — LAB ENCOUNTER (OUTPATIENT)
Dept: LAB | Age: 47
End: 2022-10-10
Attending: FAMILY MEDICINE
Payer: COMMERCIAL

## 2022-10-10 DIAGNOSIS — Z00.00 WELL ADULT EXAM: ICD-10-CM

## 2022-10-10 DIAGNOSIS — E66.01 MORBID OBESITY WITH BMI OF 40.0-44.9, ADULT (HCC): ICD-10-CM

## 2022-10-10 LAB
ALBUMIN SERPL-MCNC: 3.7 G/DL (ref 3.4–5)
ALBUMIN/GLOB SERPL: 1 {RATIO} (ref 1–2)
ALP LIVER SERPL-CCNC: 159 U/L
ALT SERPL-CCNC: 35 U/L
ANION GAP SERPL CALC-SCNC: 8 MMOL/L (ref 0–18)
AST SERPL-CCNC: 16 U/L (ref 15–37)
BASOPHILS # BLD AUTO: 0.05 X10(3) UL (ref 0–0.2)
BASOPHILS NFR BLD AUTO: 0.4 %
BILIRUB SERPL-MCNC: 0.3 MG/DL (ref 0.1–2)
BUN BLD-MCNC: 28 MG/DL (ref 7–18)
BUN/CREAT SERPL: 28.6 (ref 10–20)
CALCIUM BLD-MCNC: 10 MG/DL (ref 8.5–10.1)
CHLORIDE SERPL-SCNC: 107 MMOL/L (ref 98–112)
CHOLEST SERPL-MCNC: 186 MG/DL (ref ?–200)
CO2 SERPL-SCNC: 23 MMOL/L (ref 21–32)
CREAT BLD-MCNC: 0.98 MG/DL
DEPRECATED RDW RBC AUTO: 43.2 FL (ref 35.1–46.3)
EOSINOPHIL # BLD AUTO: 0.29 X10(3) UL (ref 0–0.7)
EOSINOPHIL NFR BLD AUTO: 2.5 %
ERYTHROCYTE [DISTWIDTH] IN BLOOD BY AUTOMATED COUNT: 13.1 % (ref 11–15)
FASTING PATIENT LIPID ANSWER: YES
FASTING STATUS PATIENT QL REPORTED: YES
GFR SERPLBLD BASED ON 1.73 SQ M-ARVRAT: 72 ML/MIN/1.73M2 (ref 60–?)
GLOBULIN PLAS-MCNC: 3.8 G/DL (ref 2.8–4.4)
GLUCOSE BLD-MCNC: 92 MG/DL (ref 70–99)
HCT VFR BLD AUTO: 46.9 %
HDLC SERPL-MCNC: 38 MG/DL (ref 40–59)
HGB BLD-MCNC: 15.2 G/DL
IMM GRANULOCYTES # BLD AUTO: 0.07 X10(3) UL (ref 0–1)
IMM GRANULOCYTES NFR BLD: 0.6 %
LDLC SERPL CALC-MCNC: 105 MG/DL (ref ?–100)
LYMPHOCYTES # BLD AUTO: 3.83 X10(3) UL (ref 1–4)
LYMPHOCYTES NFR BLD AUTO: 33.2 %
MCH RBC QN AUTO: 29.2 PG (ref 26–34)
MCHC RBC AUTO-ENTMCNC: 32.4 G/DL (ref 31–37)
MCV RBC AUTO: 90 FL
MONOCYTES # BLD AUTO: 0.76 X10(3) UL (ref 0.1–1)
MONOCYTES NFR BLD AUTO: 6.6 %
NEUTROPHILS # BLD AUTO: 6.55 X10 (3) UL (ref 1.5–7.7)
NEUTROPHILS # BLD AUTO: 6.55 X10(3) UL (ref 1.5–7.7)
NEUTROPHILS NFR BLD AUTO: 56.7 %
NONHDLC SERPL-MCNC: 148 MG/DL (ref ?–130)
OSMOLALITY SERPL CALC.SUM OF ELEC: 291 MOSM/KG (ref 275–295)
PLATELET # BLD AUTO: 340 10(3)UL (ref 150–450)
POTASSIUM SERPL-SCNC: 4.1 MMOL/L (ref 3.5–5.1)
PROT SERPL-MCNC: 7.5 G/DL (ref 6.4–8.2)
RBC # BLD AUTO: 5.21 X10(6)UL
SODIUM SERPL-SCNC: 138 MMOL/L (ref 136–145)
TRIGL SERPL-MCNC: 248 MG/DL (ref 30–149)
TSI SER-ACNC: 1.94 MIU/ML (ref 0.36–3.74)
VIT B12 SERPL-MCNC: 261 PG/ML (ref 193–986)
VIT D+METAB SERPL-MCNC: 18.2 NG/ML (ref 30–100)
VLDLC SERPL CALC-MCNC: 42 MG/DL (ref 0–30)
WBC # BLD AUTO: 11.6 X10(3) UL (ref 4–11)

## 2022-10-10 PROCEDURE — 85025 COMPLETE CBC W/AUTO DIFF WBC: CPT

## 2022-10-10 PROCEDURE — 80053 COMPREHEN METABOLIC PANEL: CPT

## 2022-10-10 PROCEDURE — 82306 VITAMIN D 25 HYDROXY: CPT | Performed by: FAMILY MEDICINE

## 2022-10-10 PROCEDURE — 82607 VITAMIN B-12: CPT

## 2022-10-10 PROCEDURE — 84443 ASSAY THYROID STIM HORMONE: CPT

## 2022-10-10 PROCEDURE — 36415 COLL VENOUS BLD VENIPUNCTURE: CPT

## 2022-10-10 PROCEDURE — 80061 LIPID PANEL: CPT

## 2022-10-15 DIAGNOSIS — D72.829 LEUKOCYTOSIS, UNSPECIFIED TYPE: ICD-10-CM

## 2022-10-15 DIAGNOSIS — E78.1 HYPERTRIGLYCERIDEMIA: Primary | ICD-10-CM

## 2022-10-15 DIAGNOSIS — E55.9 VITAMIN D DEFICIENCY: ICD-10-CM

## 2022-10-15 DIAGNOSIS — E53.8 VITAMIN B12 DEFICIENCY: ICD-10-CM

## 2022-10-15 RX ORDER — ERGOCALCIFEROL 1.25 MG/1
50000 CAPSULE ORAL WEEKLY
Qty: 12 CAPSULE | Refills: 0 | Status: SHIPPED | OUTPATIENT
Start: 2022-10-15 | End: 2022-11-14

## 2022-11-07 DIAGNOSIS — I10 ESSENTIAL HYPERTENSION: ICD-10-CM

## 2022-11-08 RX ORDER — HYDROCHLOROTHIAZIDE 12.5 MG/1
CAPSULE, GELATIN COATED ORAL
Qty: 30 CAPSULE | Refills: 1 | Status: SHIPPED | OUTPATIENT
Start: 2022-11-08

## 2022-11-21 RX ORDER — TOPIRAMATE 25 MG/1
TABLET ORAL
Qty: 90 TABLET | Refills: 0 | Status: SHIPPED | OUTPATIENT
Start: 2022-11-21

## 2022-11-23 ENCOUNTER — HOSPITAL ENCOUNTER (OUTPATIENT)
Dept: GENERAL RADIOLOGY | Age: 47
Discharge: HOME | End: 2022-11-23
Attending: FAMILY MEDICINE
Payer: COMMERCIAL

## 2022-11-23 ENCOUNTER — HOSPITAL ENCOUNTER (OUTPATIENT)
Dept: MAMMOGRAPHY | Age: 47
Discharge: HOME | End: 2022-11-23
Attending: FAMILY MEDICINE
Payer: COMMERCIAL

## 2022-11-23 DIAGNOSIS — Z12.31 ENCOUNTER FOR SCREENING MAMMOGRAM FOR BREAST CANCER: ICD-10-CM

## 2022-11-23 DIAGNOSIS — F17.200 SMOKER: ICD-10-CM

## 2022-11-23 PROCEDURE — 77067 SCR MAMMO BI INCL CAD: CPT | Performed by: FAMILY MEDICINE

## 2022-11-23 PROCEDURE — 71046 X-RAY EXAM CHEST 2 VIEWS: CPT | Performed by: FAMILY MEDICINE

## 2022-11-23 PROCEDURE — 77063 BREAST TOMOSYNTHESIS BI: CPT | Performed by: FAMILY MEDICINE

## 2022-12-03 DIAGNOSIS — I10 ESSENTIAL HYPERTENSION: ICD-10-CM

## 2022-12-05 RX ORDER — HYDROCHLOROTHIAZIDE 12.5 MG/1
CAPSULE, GELATIN COATED ORAL
Qty: 30 CAPSULE | Refills: 1 | Status: SHIPPED | OUTPATIENT
Start: 2022-12-05

## 2022-12-19 ENCOUNTER — OFFICE VISIT (OUTPATIENT)
Dept: DERMATOLOGY CLINIC | Facility: CLINIC | Age: 47
End: 2022-12-19
Payer: COMMERCIAL

## 2022-12-19 DIAGNOSIS — M67.40 GANGLION: ICD-10-CM

## 2022-12-19 DIAGNOSIS — D22.9 MULTIPLE NEVI: ICD-10-CM

## 2022-12-19 DIAGNOSIS — D48.5 NEOPLASM OF UNCERTAIN BEHAVIOR OF SKIN: Primary | ICD-10-CM

## 2022-12-19 DIAGNOSIS — L72.0 EPIDERMAL CYST: ICD-10-CM

## 2022-12-19 DIAGNOSIS — D23.9 BENIGN NEOPLASM OF SKIN, UNSPECIFIED LOCATION: ICD-10-CM

## 2022-12-19 PROCEDURE — 88305 TISSUE EXAM BY PATHOLOGIST: CPT | Performed by: DERMATOLOGY

## 2022-12-27 RX ORDER — PHENTERMINE HYDROCHLORIDE 30 MG/1
CAPSULE ORAL
Qty: 30 CAPSULE | Refills: 2 | Status: SHIPPED | OUTPATIENT
Start: 2022-12-27 | End: 2022-12-29 | Stop reason: DRUGHIGH

## 2022-12-27 NOTE — PROGRESS NOTES
Operative Report                     Shave/  Tangential biopsy     Clinical diagnosis:    Size of lesion:    Location:pt with new lesion irregular purple brown  Spec 1 Description >>>>>: left medial thigh  Spec 1 Comment: new violaceous papule 1cm r/o atypical lesion    Procedure: With patient in appropriate position the skin of the above was scrubbed with alcohol. Anesthesia was obtained with 1% Xylocaine with epinephrine. The skin surrounding the lesion was placed under tension and the lesion was incised using a #15 scalpel blade. The specimen was sent for histopathologic exam.    Hemostasis was obtained with electrocautery/aluminum chloride. Estimated blood loss less than 2 cc. Biopsy dressed with Polysporin, bandage. Pressure dressing:   No    Complications: None    Written instructions given and reviewed with patient    Await pathology    Contact information reviewed.     Procedural physician:  Teja Ribera MD

## 2022-12-29 ENCOUNTER — OFFICE VISIT (OUTPATIENT)
Dept: SURGERY | Facility: CLINIC | Age: 47
End: 2022-12-29
Payer: COMMERCIAL

## 2022-12-29 VITALS
SYSTOLIC BLOOD PRESSURE: 137 MMHG | WEIGHT: 229.81 LBS | HEIGHT: 63 IN | OXYGEN SATURATION: 95 % | DIASTOLIC BLOOD PRESSURE: 88 MMHG | BODY MASS INDEX: 40.72 KG/M2 | HEART RATE: 81 BPM

## 2022-12-29 DIAGNOSIS — F43.9 STRESS: ICD-10-CM

## 2022-12-29 DIAGNOSIS — I10 ESSENTIAL HYPERTENSION: ICD-10-CM

## 2022-12-29 DIAGNOSIS — R63.2 BINGE EATING: ICD-10-CM

## 2022-12-29 DIAGNOSIS — K76.0 FATTY LIVER: Primary | ICD-10-CM

## 2022-12-29 DIAGNOSIS — E66.01 MORBID OBESITY WITH BMI OF 40.0-44.9, ADULT (HCC): ICD-10-CM

## 2022-12-29 PROCEDURE — 99214 OFFICE O/P EST MOD 30 MIN: CPT | Performed by: INTERNAL MEDICINE

## 2022-12-29 PROCEDURE — 3075F SYST BP GE 130 - 139MM HG: CPT | Performed by: INTERNAL MEDICINE

## 2022-12-29 PROCEDURE — 3008F BODY MASS INDEX DOCD: CPT | Performed by: INTERNAL MEDICINE

## 2022-12-29 PROCEDURE — 3079F DIAST BP 80-89 MM HG: CPT | Performed by: INTERNAL MEDICINE

## 2022-12-29 RX ORDER — PHENTERMINE HYDROCHLORIDE 37.5 MG/1
37.5 TABLET ORAL
Qty: 30 TABLET | Refills: 2 | Status: SHIPPED | OUTPATIENT
Start: 2022-12-29

## 2022-12-29 RX ORDER — TOPIRAMATE 25 MG/1
25 TABLET ORAL 2 TIMES DAILY
Qty: 180 TABLET | Refills: 1 | Status: SHIPPED | OUTPATIENT
Start: 2022-12-29 | End: 2023-03-29

## 2023-01-06 ENCOUNTER — PATIENT MESSAGE (OUTPATIENT)
Dept: FAMILY MEDICINE CLINIC | Facility: CLINIC | Age: 48
End: 2023-01-06

## 2023-01-08 RX ORDER — LISINOPRIL 10 MG/1
10 TABLET ORAL DAILY
Qty: 90 TABLET | Refills: 0 | Status: SHIPPED | OUTPATIENT
Start: 2023-01-08 | End: 2023-04-08

## 2023-01-08 NOTE — TELEPHONE ENCOUNTER
Please advise on request.  Rx pended for lisinopril 10mg. Pt informed to submit blood pressure readings. Last office visit 9/29/22  3. Essential hypertension  Blood pressure is lower. Recommend to decrease lisinopril to half tablet daily. Similar pressure readings in the next 1 week. If blood pressure is well controlled may consider increasing hydrochlorothiazide and discontinue lisinopril. Patient agreeable with plan. - atenolol 50 MG Oral Tab; Take 1 tablet (50 mg total) by mouth daily. Dispense: 90 tablet; Refill: 3    From: Merlin Park  To: Aggie Schmitt MD  Sent: 1/6/2023  6:20 PM CST  Subject: Prescription refill    Francis Griffith been taking lisinopril 20 MG Tabs, cutting half the pill. I ran out of my prescription from my previous doctor. Would you be able to send a new prescription over to [facility],  for lisinopril 10MG Tabs as we discussed at my appointment?      Thanks,  Starling Castleman

## 2023-01-26 ENCOUNTER — TELEPHONE (OUTPATIENT)
Dept: INTERNAL MEDICINE CLINIC | Facility: CLINIC | Age: 48
End: 2023-01-26

## 2023-01-26 NOTE — TELEPHONE ENCOUNTER
Called patient to change pcp, patient is coming up on [facility] List. She needs to call insurance and change pcp on insurance to Dr. Soumya Nino     Left message to call the office back for more information

## 2023-01-31 DIAGNOSIS — E66.01 MORBID OBESITY WITH BMI OF 40.0-44.9, ADULT (HCC): Primary | ICD-10-CM

## 2023-01-31 RX ORDER — PHENTERMINE HYDROCHLORIDE 37.5 MG/1
37.5 TABLET ORAL
Qty: 30 TABLET | Refills: 2 | Status: SHIPPED | OUTPATIENT
Start: 2023-01-31

## 2023-02-17 ENCOUNTER — OFFICE VISIT (OUTPATIENT)
Dept: OTOLARYNGOLOGY | Facility: CLINIC | Age: 48
End: 2023-02-17

## 2023-02-17 VITALS — BODY MASS INDEX: 40.72 KG/M2 | WEIGHT: 229.81 LBS | HEIGHT: 63 IN | TEMPERATURE: 97 F

## 2023-02-17 DIAGNOSIS — L72.9 CYST OF SKIN OF EYEBROW: Primary | ICD-10-CM

## 2023-02-17 PROCEDURE — 3008F BODY MASS INDEX DOCD: CPT | Performed by: OTOLARYNGOLOGY

## 2023-02-17 PROCEDURE — 99243 OFF/OP CNSLTJ NEW/EST LOW 30: CPT | Performed by: OTOLARYNGOLOGY

## 2023-02-23 ENCOUNTER — TELEPHONE (OUTPATIENT)
Dept: INTERNAL MEDICINE CLINIC | Facility: CLINIC | Age: 48
End: 2023-02-23

## 2023-02-23 NOTE — TELEPHONE ENCOUNTER
Called patient in regards to PCP. She is coming up under Dr. Santi Ratliff list but currently see that she has another provider. Patient needs to call insurance to update her pcp to get removed from Dr. Obrien Page.      Left message to call back if further questions

## 2023-03-03 DIAGNOSIS — I10 ESSENTIAL HYPERTENSION: ICD-10-CM

## 2023-03-03 RX ORDER — HYDROCHLOROTHIAZIDE 12.5 MG/1
CAPSULE, GELATIN COATED ORAL
Qty: 90 CAPSULE | Refills: 0 | Status: SHIPPED | OUTPATIENT
Start: 2023-03-03

## 2023-03-15 ENCOUNTER — OFFICE VISIT (OUTPATIENT)
Dept: FAMILY MEDICINE CLINIC | Facility: CLINIC | Age: 48
End: 2023-03-15
Payer: COMMERCIAL

## 2023-03-15 VITALS
RESPIRATION RATE: 18 BRPM | OXYGEN SATURATION: 98 % | SYSTOLIC BLOOD PRESSURE: 108 MMHG | WEIGHT: 225 LBS | BODY MASS INDEX: 39.87 KG/M2 | HEIGHT: 63 IN | TEMPERATURE: 98 F | DIASTOLIC BLOOD PRESSURE: 81 MMHG | HEART RATE: 73 BPM

## 2023-03-15 DIAGNOSIS — J45.901 MILD ASTHMA EXACERBATION: ICD-10-CM

## 2023-03-15 DIAGNOSIS — J32.9 RHINOSINUSITIS: Primary | ICD-10-CM

## 2023-03-15 DIAGNOSIS — J31.0 RHINOSINUSITIS: Primary | ICD-10-CM

## 2023-03-15 PROCEDURE — 3074F SYST BP LT 130 MM HG: CPT | Performed by: NURSE PRACTITIONER

## 2023-03-15 PROCEDURE — 3008F BODY MASS INDEX DOCD: CPT | Performed by: NURSE PRACTITIONER

## 2023-03-15 PROCEDURE — 3079F DIAST BP 80-89 MM HG: CPT | Performed by: NURSE PRACTITIONER

## 2023-03-15 PROCEDURE — 99213 OFFICE O/P EST LOW 20 MIN: CPT | Performed by: NURSE PRACTITIONER

## 2023-03-15 RX ORDER — PREDNISONE 20 MG/1
TABLET ORAL
Qty: 10 TABLET | Refills: 0 | Status: SHIPPED | OUTPATIENT
Start: 2023-03-15

## 2023-03-15 RX ORDER — AMOXICILLIN AND CLAVULANATE POTASSIUM 875; 125 MG/1; MG/1
1 TABLET, FILM COATED ORAL 2 TIMES DAILY
Qty: 14 TABLET | Refills: 0 | Status: SHIPPED | OUTPATIENT
Start: 2023-03-15 | End: 2023-03-22

## 2023-03-23 ENCOUNTER — TELEPHONE (OUTPATIENT)
Dept: INTERNAL MEDICINE CLINIC | Facility: CLINIC | Age: 48
End: 2023-03-23

## 2023-03-29 ENCOUNTER — HOSPITAL ENCOUNTER (OUTPATIENT)
Dept: MRI IMAGING | Facility: HOSPITAL | Age: 48
Discharge: HOME | End: 2023-03-29
Attending: PHYSICIAN ASSISTANT
Payer: COMMERCIAL

## 2023-03-29 DIAGNOSIS — D72.829 LEUKOCYTOSIS, UNSPECIFIED TYPE: ICD-10-CM

## 2023-03-29 DIAGNOSIS — E78.1 HYPERTRIGLYCERIDEMIA: ICD-10-CM

## 2023-03-29 DIAGNOSIS — R16.0 LIVER MASS, RIGHT LOBE: ICD-10-CM

## 2023-03-29 LAB
ALBUMIN SERPL-MCNC: 3.4 G/DL (ref 3.4–5)
ALBUMIN/GLOB SERPL: 0.9 {RATIO} (ref 1–2)
ALP LIVER SERPL-CCNC: 165 U/L
ALT SERPL-CCNC: 36 U/L
ANION GAP SERPL CALC-SCNC: 6 MMOL/L (ref 0–18)
AST SERPL-CCNC: 18 U/L (ref 15–37)
BASOPHILS # BLD AUTO: 0.06 X10(3) UL (ref 0–0.2)
BASOPHILS NFR BLD AUTO: 0.4 %
BILIRUB SERPL-MCNC: 0.3 MG/DL (ref 0.1–2)
BUN BLD-MCNC: 22 MG/DL (ref 7–18)
BUN/CREAT SERPL: 20.8 (ref 10–20)
CALCIUM BLD-MCNC: 10.5 MG/DL (ref 8.5–10.1)
CHLORIDE SERPL-SCNC: 107 MMOL/L (ref 98–112)
CHOLEST SERPL-MCNC: 236 MG/DL (ref ?–200)
CO2 SERPL-SCNC: 27 MMOL/L (ref 21–32)
CREAT BLD-MCNC: 1.06 MG/DL
DEPRECATED RDW RBC AUTO: 46.5 FL (ref 35.1–46.3)
EOSINOPHIL # BLD AUTO: 0.3 X10(3) UL (ref 0–0.7)
EOSINOPHIL NFR BLD AUTO: 2.1 %
ERYTHROCYTE [DISTWIDTH] IN BLOOD BY AUTOMATED COUNT: 14.5 % (ref 11–15)
FASTING PATIENT LIPID ANSWER: YES
FASTING STATUS PATIENT QL REPORTED: YES
GFR SERPLBLD BASED ON 1.73 SQ M-ARVRAT: 66 ML/MIN/1.73M2 (ref 60–?)
GLOBULIN PLAS-MCNC: 4 G/DL (ref 2.8–4.4)
GLUCOSE BLD-MCNC: 107 MG/DL (ref 70–99)
HCT VFR BLD AUTO: 47.1 %
HDLC SERPL-MCNC: 46 MG/DL (ref 40–59)
HGB BLD-MCNC: 15.4 G/DL
IMM GRANULOCYTES # BLD AUTO: 0.16 X10(3) UL (ref 0–1)
IMM GRANULOCYTES NFR BLD: 1.1 %
LDLC SERPL CALC-MCNC: 135 MG/DL (ref ?–100)
LYMPHOCYTES # BLD AUTO: 4.2 X10(3) UL (ref 1–4)
LYMPHOCYTES NFR BLD AUTO: 29.3 %
MCH RBC QN AUTO: 28.5 PG (ref 26–34)
MCHC RBC AUTO-ENTMCNC: 32.7 G/DL (ref 31–37)
MCV RBC AUTO: 87.1 FL
MONOCYTES # BLD AUTO: 0.74 X10(3) UL (ref 0.1–1)
MONOCYTES NFR BLD AUTO: 5.2 %
NEUTROPHILS # BLD AUTO: 8.86 X10 (3) UL (ref 1.5–7.7)
NEUTROPHILS # BLD AUTO: 8.86 X10(3) UL (ref 1.5–7.7)
NEUTROPHILS NFR BLD AUTO: 61.9 %
NONHDLC SERPL-MCNC: 190 MG/DL (ref ?–130)
OSMOLALITY SERPL CALC.SUM OF ELEC: 294 MOSM/KG (ref 275–295)
PLATELET # BLD AUTO: 359 10(3)UL (ref 150–450)
POTASSIUM SERPL-SCNC: 4 MMOL/L (ref 3.5–5.1)
PROT SERPL-MCNC: 7.4 G/DL (ref 6.4–8.2)
RBC # BLD AUTO: 5.41 X10(6)UL
SODIUM SERPL-SCNC: 140 MMOL/L (ref 136–145)
TRIGL SERPL-MCNC: 309 MG/DL (ref 30–149)
VLDLC SERPL CALC-MCNC: 58 MG/DL (ref 0–30)
WBC # BLD AUTO: 14.3 X10(3) UL (ref 4–11)

## 2023-03-29 PROCEDURE — 74183 MRI ABD W/O CNTR FLWD CNTR: CPT | Performed by: PHYSICIAN ASSISTANT

## 2023-03-29 PROCEDURE — 80053 COMPREHEN METABOLIC PANEL: CPT

## 2023-03-29 PROCEDURE — 36591 DRAW BLOOD OFF VENOUS DEVICE: CPT

## 2023-03-29 PROCEDURE — A9575 INJ GADOTERATE MEGLUMI 0.1ML: HCPCS | Performed by: PHYSICIAN ASSISTANT

## 2023-03-29 PROCEDURE — 36415 COLL VENOUS BLD VENIPUNCTURE: CPT

## 2023-03-29 PROCEDURE — 85025 COMPLETE CBC W/AUTO DIFF WBC: CPT

## 2023-03-29 PROCEDURE — 80061 LIPID PANEL: CPT

## 2023-03-29 RX ORDER — GADOTERATE MEGLUMINE 376.9 MG/ML
20 INJECTION INTRAVENOUS
Status: COMPLETED | OUTPATIENT
Start: 2023-03-29 | End: 2023-03-29

## 2023-03-29 RX ADMIN — GADOTERATE MEGLUMINE 20 ML: 376.9 INJECTION INTRAVENOUS at 07:51:00

## 2023-03-31 ENCOUNTER — OFFICE VISIT (OUTPATIENT)
Dept: OBGYN CLINIC | Facility: CLINIC | Age: 48
End: 2023-03-31

## 2023-03-31 VITALS
WEIGHT: 234 LBS | SYSTOLIC BLOOD PRESSURE: 100 MMHG | HEART RATE: 79 BPM | BODY MASS INDEX: 41 KG/M2 | DIASTOLIC BLOOD PRESSURE: 70 MMHG

## 2023-03-31 DIAGNOSIS — Z12.31 ENCOUNTER FOR SCREENING MAMMOGRAM FOR BREAST CANCER: ICD-10-CM

## 2023-03-31 DIAGNOSIS — Z01.419 ENCOUNTER FOR GYNECOLOGICAL EXAMINATION: Primary | ICD-10-CM

## 2023-03-31 DIAGNOSIS — N76.2 ACUTE VULVITIS: ICD-10-CM

## 2023-03-31 PROCEDURE — 99396 PREV VISIT EST AGE 40-64: CPT | Performed by: OBSTETRICS & GYNECOLOGY

## 2023-03-31 PROCEDURE — 3074F SYST BP LT 130 MM HG: CPT | Performed by: OBSTETRICS & GYNECOLOGY

## 2023-03-31 PROCEDURE — 3078F DIAST BP <80 MM HG: CPT | Performed by: OBSTETRICS & GYNECOLOGY

## 2023-04-02 LAB
GENITAL VAGINOSIS SCREEN: NEGATIVE
TRICHOMONAS SCREEN: NEGATIVE

## 2023-04-03 LAB — HPV I/H RISK 1 DNA SPEC QL NAA+PROBE: NEGATIVE

## 2023-04-04 RX ORDER — LISINOPRIL 10 MG/1
10 TABLET ORAL DAILY
Qty: 90 TABLET | Refills: 1 | Status: SHIPPED | OUTPATIENT
Start: 2023-04-04

## 2023-04-04 NOTE — TELEPHONE ENCOUNTER
Refill passed per [place] protocol.    Requested Prescriptions   Pending Prescriptions Disp Refills    LISINOPRIL 10 MG Oral Tab [Pharmacy Med Name: LISINOPRIL 10 MG TABLET] 90 tablet 0     Sig: TAKE 1 TABLET BY MOUTH EVERY DAY       Hypertensive Medications Protocol Failed - 4/3/2023  6:22 PM        Failed - In person appointment or virtual visit in the past 6 months     Recent Outpatient Visits              4 days ago Encounter for gynecological examination    Liliam Orr, [address], [address] [facility] Neha Murray MD    Office Visit    2 weeks ago Rhinosinusitis    Liliam Orr, [address], [address], APN    Office Visit    1 month ago Cyst of skin of eyebrow    [facility], [address], Eqle-Hglwhs-GdjvprfJustina Louie MD    Office Visit    3 months ago Fatty liver    Noelle Ross MD    Office Visit    3 months ago Neoplasm of uncertain behavior of skin    Nelma Eisenmenger, Martha Dixon MD    Office Visit          Future Appointments         Provider Department Appt Notes    Tomorrow MD Liliam Aguilar, [facility]     In 2 weeks MD Liliam Steel, [facility] Multiple lab abnormalities    In 1 month MD Liliam Blanco, [address], [address] [facility] HMO  NON SX F/U    In 7 months ADO DEXA RM1; [facility] [address] - In person appointment in the past 12 or next 3 months     Recent Outpatient Visits              4 days ago Encounter for gynecological examination    Liliam Orr, [address], [address] [facility] Neha Murray MD    Office Visit    2 weeks ago Rhinosinusitis    Guy Johnston, [address], [place] EMILIE Barker    Office Visit    1 month ago Cyst of skin of eyebrow    [facility], [address], Jesu Posada MD    Office Visit    3 months ago Fatty liver    Sneha Leo, [address], Kristin Decker MD    Office Visit    3 months ago Neoplasm of uncertain behavior of skin    Nelda Mc MD    Office Visit          Future Appointments         Provider Department Appt Notes    Tomorrow Dahdaleh, Pattie Meckel, MD Emmit Cristal, [address]     In 2 weeks MD Sneha Ta, [address] [place] [address] Multiple lab abnormalities    In 1 month MD Sneha Cry, [place] [address], [address] [place] [address] HMO  NON SX F/U    In 7 months ADO DEXA RM1; [facility] [address] [facility] - Alfonso Mammogram               Passed - Last BP reading less than 140/90     BP Readings from Last 1 Encounters:  03/31/23 : 100/70              Passed - CMP or BMP in past 6 months     Recent Results (from the past 4392 hour(s))   Comp Metabolic Panel (14)    Collection Time: 03/29/23  7:05 AM   Result Value Ref Range    Glucose 107 (H) 70 - 99 mg/dL    Sodium 140 136 - 145 mmol/L    Potassium 4.0 3.5 - 5.1 mmol/L    Chloride 107 98 - 112 mmol/L    CO2 27.0 21.0 - 32.0 mmol/L    Anion Gap 6 0 - 18 mmol/L    BUN 22 (H) 7 - 18 mg/dL    Creatinine 1.06 (H) 0.55 - 1.02 mg/dL    BUN/CREA Ratio 20.8 (H) 10.0 - 20.0    Calcium, Total 10.5 (H) 8.5 - 10.1 mg/dL    Calculated Osmolality 294 275 - 295 mOsm/kg    eGFR-Cr 66 >=60 mL/min/1.73m2    ALT 36 13 - 56 U/L    AST 18 15 - 37 U/L    Alkaline Phosphatase 165 (H) 39 - 100 U/L    Bilirubin, Total 0.3 0.1 - 2.0 mg/dL    Total Protein 7.4 6.4 - 8.2 g/dL    Albumin 3.4 3.4 - 5.0 g/dL    Globulin  4.0 2.8 - 4.4 g/dL    A/G Ratio 0.9 (L) 1.0 - 2.0    Patient Fasting for CMP?  Yes      *Note: Due to a large number of results and/or encounters for the requested time period, some results have not been displayed. A complete set of results can be found in Results Review.                Passed - EGFRCR or GFRNAA > 50     GFR Evaluation  EGFRCR: 66 , resulted on 3/29/2023                Recent Outpatient Visits              4 days ago Encounter for gynecological examination    Colby Born, [address], [address] [facility] Pb Houston MD    Office Visit    2 weeks ago Rhinosinusitis    Colby Born, [facility], [address], [place] APN    Office Visit    1 month ago Cyst of skin of Kaylene Dennis, [address], Howie Posada MD    Office Visit    3 months ago Fatty liver    Willie Barbosa MD    Office Visit    3 months ago Neoplasm of uncertain behavior of skin    Delroy Javier MD    Office Visit            Future Appointments         Provider Department Appt Notes    Tomorrow Kelly Levya MD Sonjia Born, [address] [facility]     In 2 weeks MD Colby Whitfield Born, [facility] Multiple lab abnormalities    In 1 month MD Colby Spencer Born, [address], [address] [facility] HMO  NON SX F/U    In 7 months ADO DEXA RM1; [facility] [address]

## 2023-04-05 ENCOUNTER — OFFICE VISIT (OUTPATIENT)
Dept: SURGERY | Facility: CLINIC | Age: 48
End: 2023-04-05
Payer: COMMERCIAL

## 2023-04-05 DIAGNOSIS — R16.0 LIVER MASS: Primary | ICD-10-CM

## 2023-04-05 PROCEDURE — 99215 OFFICE O/P EST HI 40 MIN: CPT | Performed by: SURGERY

## 2023-04-11 ENCOUNTER — TELEPHONE (OUTPATIENT)
Dept: SURGERY | Facility: CLINIC | Age: 48
End: 2023-04-11

## 2023-04-18 ENCOUNTER — PATIENT MESSAGE (OUTPATIENT)
Dept: OBGYN CLINIC | Facility: CLINIC | Age: 48
End: 2023-04-18

## 2023-04-18 LAB — LAST PAP RESULT: NORMAL

## 2023-04-18 RX ORDER — FLUCONAZOLE 150 MG/1
150 TABLET ORAL ONCE
Qty: 1 TABLET | Refills: 0 | Status: SHIPPED | OUTPATIENT
Start: 2023-04-18 | End: 2023-04-18

## 2023-04-20 ENCOUNTER — OFFICE VISIT (OUTPATIENT)
Dept: FAMILY MEDICINE CLINIC | Facility: CLINIC | Age: 48
End: 2023-04-20

## 2023-04-20 VITALS
HEART RATE: 83 BPM | BODY MASS INDEX: 42.52 KG/M2 | DIASTOLIC BLOOD PRESSURE: 72 MMHG | SYSTOLIC BLOOD PRESSURE: 109 MMHG | HEIGHT: 63 IN | WEIGHT: 240 LBS | TEMPERATURE: 97 F

## 2023-04-20 DIAGNOSIS — R73.9 HYPERGLYCEMIA: ICD-10-CM

## 2023-04-20 DIAGNOSIS — F41.9 ANXIETY AND DEPRESSION: ICD-10-CM

## 2023-04-20 DIAGNOSIS — R40.0 SLEEPINESS: ICD-10-CM

## 2023-04-20 DIAGNOSIS — I49.9 CARDIAC ARRHYTHMIA, UNSPECIFIED CARDIAC ARRHYTHMIA TYPE: ICD-10-CM

## 2023-04-20 DIAGNOSIS — R16.0 LIVER MASS: ICD-10-CM

## 2023-04-20 DIAGNOSIS — R74.8 ELEVATED ALKALINE PHOSPHATASE LEVEL: Primary | ICD-10-CM

## 2023-04-20 DIAGNOSIS — R53.83 FATIGUE, UNSPECIFIED TYPE: ICD-10-CM

## 2023-04-20 DIAGNOSIS — E78.00 HYPERCHOLESTEROLEMIA: ICD-10-CM

## 2023-04-20 DIAGNOSIS — E66.01 MORBID OBESITY WITH BMI OF 40.0-44.9, ADULT (HCC): ICD-10-CM

## 2023-04-20 DIAGNOSIS — F32.A ANXIETY AND DEPRESSION: ICD-10-CM

## 2023-04-20 DIAGNOSIS — D72.829 LEUKOCYTOSIS, UNSPECIFIED TYPE: ICD-10-CM

## 2023-04-20 PROCEDURE — 3074F SYST BP LT 130 MM HG: CPT | Performed by: FAMILY MEDICINE

## 2023-04-20 PROCEDURE — 3078F DIAST BP <80 MM HG: CPT | Performed by: FAMILY MEDICINE

## 2023-04-20 PROCEDURE — 3008F BODY MASS INDEX DOCD: CPT | Performed by: FAMILY MEDICINE

## 2023-04-20 PROCEDURE — 99214 OFFICE O/P EST MOD 30 MIN: CPT | Performed by: FAMILY MEDICINE

## 2023-05-04 ENCOUNTER — OFFICE VISIT (OUTPATIENT)
Dept: SURGERY | Facility: CLINIC | Age: 48
End: 2023-05-04
Payer: COMMERCIAL

## 2023-05-04 VITALS
DIASTOLIC BLOOD PRESSURE: 75 MMHG | HEART RATE: 76 BPM | BODY MASS INDEX: 41.66 KG/M2 | OXYGEN SATURATION: 97 % | WEIGHT: 235.13 LBS | HEIGHT: 63 IN | SYSTOLIC BLOOD PRESSURE: 109 MMHG

## 2023-05-04 DIAGNOSIS — E66.01 MORBID OBESITY WITH BMI OF 40.0-44.9, ADULT (HCC): ICD-10-CM

## 2023-05-04 DIAGNOSIS — E88.81 INSULIN RESISTANCE: ICD-10-CM

## 2023-05-04 DIAGNOSIS — K76.0 FATTY LIVER: ICD-10-CM

## 2023-05-04 DIAGNOSIS — I10 ESSENTIAL HYPERTENSION: Primary | ICD-10-CM

## 2023-05-04 DIAGNOSIS — F43.9 STRESS: ICD-10-CM

## 2023-05-04 DIAGNOSIS — R63.2 BINGE EATING: ICD-10-CM

## 2023-05-04 PROCEDURE — 3078F DIAST BP <80 MM HG: CPT | Performed by: INTERNAL MEDICINE

## 2023-05-04 PROCEDURE — 3074F SYST BP LT 130 MM HG: CPT | Performed by: INTERNAL MEDICINE

## 2023-05-04 PROCEDURE — 3008F BODY MASS INDEX DOCD: CPT | Performed by: INTERNAL MEDICINE

## 2023-05-04 PROCEDURE — 99214 OFFICE O/P EST MOD 30 MIN: CPT | Performed by: INTERNAL MEDICINE

## 2023-05-04 RX ORDER — PHENTERMINE HYDROCHLORIDE 37.5 MG/1
37.5 TABLET ORAL
Qty: 30 TABLET | Refills: 2 | Status: SHIPPED | OUTPATIENT
Start: 2023-05-04

## 2023-05-17 ENCOUNTER — TELEPHONE (OUTPATIENT)
Dept: FAMILY MEDICINE CLINIC | Facility: CLINIC | Age: 48
End: 2023-05-17

## 2023-05-17 NOTE — TELEPHONE ENCOUNTER
LVM to call us back wanted to inform pt is she is ok with switching her symbicort inhaler to Advair Diskus due to insurance.

## 2023-05-23 ENCOUNTER — OFFICE VISIT (OUTPATIENT)
Dept: FAMILY MEDICINE CLINIC | Facility: CLINIC | Age: 48
End: 2023-05-23
Payer: COMMERCIAL

## 2023-05-23 VITALS
SYSTOLIC BLOOD PRESSURE: 117 MMHG | BODY MASS INDEX: 42.17 KG/M2 | TEMPERATURE: 98 F | HEART RATE: 90 BPM | DIASTOLIC BLOOD PRESSURE: 79 MMHG | RESPIRATION RATE: 18 BRPM | WEIGHT: 238 LBS | HEIGHT: 63 IN

## 2023-05-23 DIAGNOSIS — J02.9 SORE THROAT: ICD-10-CM

## 2023-05-23 DIAGNOSIS — Z20.818 EXPOSURE TO STREP THROAT: ICD-10-CM

## 2023-05-23 DIAGNOSIS — J02.0 STREP PHARYNGITIS: Primary | ICD-10-CM

## 2023-05-23 DIAGNOSIS — Z72.0 TOBACCO USE: ICD-10-CM

## 2023-05-23 DIAGNOSIS — B37.0 THRUSH: ICD-10-CM

## 2023-05-23 LAB
CONTROL LINE PRESENT WITH A CLEAR BACKGROUND (YES/NO): YES YES/NO
KIT LOT #: ABNORMAL NUMERIC
STREP GRP A CUL-SCR: POSITIVE

## 2023-05-23 PROCEDURE — 3074F SYST BP LT 130 MM HG: CPT | Performed by: NURSE PRACTITIONER

## 2023-05-23 PROCEDURE — 87635 SARS-COV-2 COVID-19 AMP PRB: CPT | Performed by: NURSE PRACTITIONER

## 2023-05-23 PROCEDURE — 3078F DIAST BP <80 MM HG: CPT | Performed by: NURSE PRACTITIONER

## 2023-05-23 PROCEDURE — 3008F BODY MASS INDEX DOCD: CPT | Performed by: NURSE PRACTITIONER

## 2023-05-23 PROCEDURE — 87880 STREP A ASSAY W/OPTIC: CPT | Performed by: NURSE PRACTITIONER

## 2023-05-23 PROCEDURE — 99213 OFFICE O/P EST LOW 20 MIN: CPT | Performed by: NURSE PRACTITIONER

## 2023-05-23 RX ORDER — AMOXICILLIN 500 MG/1
500 CAPSULE ORAL 2 TIMES DAILY
Qty: 20 CAPSULE | Refills: 0 | Status: SHIPPED | OUTPATIENT
Start: 2023-05-23 | End: 2023-06-02

## 2023-05-23 NOTE — PATIENT INSTRUCTIONS
You are considered to be contagious until you have been on antibiotics for 24 hours. You can return to school and/or work once on antibiotics for 24 hours. Can use over the counter Tylenol/Ibuprofen as needed and directed on the packing for pain/fever  Increase your fluids. You may use warm or cool liquids, whichever is soothing for you  Change tooth brush two days into antibiotic therapy. Do not share utensils or drinks with anyone. Warm salt water gargles multiples times per day are helpful (1 tsp of salt dissolved in a cup of warm water) for at least 3 days. Cepacol lozenges with Benzocaine 15 mg and Menthol 3.6 mg are can help numb your sore throat temporarily in adults and older children  Follow up in 2-3 days with your PCP or in our clinic if not improving, or if your fever is greater than or equal to 100.4F for 72hours  If your symptoms markedly worsen or you develop a stiff neck, difficulty opening up your jaw, neck swelling, difficulty swallowing or holding your saliva, or a hot- potato voice seek emergency care.    Be sure to finish entire course of antibiotics to reduce risk of reinfection or antibiotic resistance   reduce risk of reinfection or antibiotic resistance

## 2023-05-24 LAB — SARS-COV-2 RNA RESP QL NAA+PROBE: NOT DETECTED

## 2023-06-05 DIAGNOSIS — I10 ESSENTIAL HYPERTENSION: ICD-10-CM

## 2023-06-05 RX ORDER — HYDROCHLOROTHIAZIDE 12.5 MG/1
CAPSULE, GELATIN COATED ORAL
Qty: 90 CAPSULE | Refills: 0 | Status: SHIPPED | OUTPATIENT
Start: 2023-06-05

## 2023-06-09 DIAGNOSIS — J45.30 MILD PERSISTENT ASTHMA WITHOUT COMPLICATION: ICD-10-CM

## 2023-06-09 RX ORDER — ALBUTEROL SULFATE 90 UG/1
2 AEROSOL, METERED RESPIRATORY (INHALATION) EVERY 6 HOURS PRN
Qty: 3 EACH | Refills: 1 | Status: SHIPPED | OUTPATIENT
Start: 2023-06-09

## 2023-06-09 NOTE — TELEPHONE ENCOUNTER
Refill passed per [place] [facility] [place] protocol.    Requested Prescriptions   Pending Prescriptions Disp Refills    ALBUTEROL 108 (90 Base) MCG/ACT Inhalation Aero Soln [Pharmacy Med Name: ALBUTEROL HFA (PROAIR) INHALER] 8.5 each 3     Sig: TAKE 2 PUFFS BY MOUTH EVERY 6 HOURS AS NEEDED FOR WHEEZE OR SHORTNESS OF BREATH       Asthma & COPD Medication Protocol Passed - 6/9/2023 12:43 AM        Passed - In person appointment or virtual visit in the past 6 mos or appointment in next 3 mos     Recent Outpatient Visits              2 weeks ago Strep pharyngitis    [facility], [address], [address], [place] EMILIE Degroot    Office Visit    1 month ago Essential hypertension    [address] [facility], Nicole Talavera MD    Office Visit    1 month ago Elevated alkaline phosphatase level    Radha Cast, Marcelino Galdamez MD    Office Visit    2 months ago Liver mass    [address], [address] [facility], [facility]    Office Visit    2 months ago Encounter for gynecological examination    [address], [address], [address] [facility] Donny Grey MD    Office Visit          Future Appointments         Provider Department Appt Notes    In 3 months Taylor Perla  [address], [address], [address] [facility] HMO  NON SX F/U    In 5 months ADO DEXA RM1; [facility] [address]                    Recent Outpatient Visits              2 weeks ago Strep pharyngitis    [place], [address] [facility],[address], [place] EMILIE Degroot    Office Visit    1 month ago Essential hypertension    [address] [facility], Nicole Talavera MD    Office Visit    1 month ago Elevated alkaline phosphatase level    Olman Aldrich MD    Office Visit    2 months ago Liver mass    [address] Nimco Soto MD    Office Visit    2 months ago Encounter for gynecological examination    [address] [place],[address], [address], [address] [facility] Dakota Gonzalez MD    Office Visit            Future Appointments         Provider Department Appt Notes    In 3 months [facility], [address] [facility] HMO  NON SX F/U    In 5 months ADO DEXA RM1; [facility] [address]

## 2023-06-12 ENCOUNTER — TELEPHONE (OUTPATIENT)
Dept: INTERNAL MEDICINE CLINIC | Facility: CLINIC | Age: 48
End: 2023-06-12

## 2023-06-16 RX ORDER — TOPIRAMATE 25 MG/1
TABLET ORAL
Qty: 180 TABLET | Refills: 1 | Status: SHIPPED | OUTPATIENT
Start: 2023-06-16

## 2023-06-19 ENCOUNTER — TELEPHONE (OUTPATIENT)
Dept: OTOLARYNGOLOGY | Facility: CLINIC | Age: 48
End: 2023-06-19

## 2023-06-19 ENCOUNTER — PATIENT MESSAGE (OUTPATIENT)
Dept: OTOLARYNGOLOGY | Facility: CLINIC | Age: 48
End: 2023-06-19

## 2023-06-20 ENCOUNTER — LAB ENCOUNTER (OUTPATIENT)
Dept: LAB | Age: 48
End: 2023-06-20
Attending: FAMILY MEDICINE
Payer: COMMERCIAL

## 2023-06-20 ENCOUNTER — PATIENT MESSAGE (OUTPATIENT)
Dept: FAMILY MEDICINE CLINIC | Facility: CLINIC | Age: 48
End: 2023-06-20

## 2023-06-20 ENCOUNTER — EKG ENCOUNTER (OUTPATIENT)
Dept: LAB | Age: 48
End: 2023-06-20
Attending: FAMILY MEDICINE
Payer: COMMERCIAL

## 2023-06-20 DIAGNOSIS — D72.829 LEUKOCYTOSIS, UNSPECIFIED TYPE: ICD-10-CM

## 2023-06-20 DIAGNOSIS — R73.9 HYPERGLYCEMIA: ICD-10-CM

## 2023-06-20 DIAGNOSIS — R53.83 FATIGUE, UNSPECIFIED TYPE: ICD-10-CM

## 2023-06-20 DIAGNOSIS — I49.9 CARDIAC ARRHYTHMIA, UNSPECIFIED CARDIAC ARRHYTHMIA TYPE: ICD-10-CM

## 2023-06-20 DIAGNOSIS — R74.8 ELEVATED ALKALINE PHOSPHATASE LEVEL: ICD-10-CM

## 2023-06-20 LAB
ALBUMIN SERPL-MCNC: 3.6 G/DL (ref 3.4–5)
ALBUMIN/GLOB SERPL: 0.9 {RATIO} (ref 1–2)
ALP LIVER SERPL-CCNC: 142 U/L
ALT SERPL-CCNC: 56 U/L
ANION GAP SERPL CALC-SCNC: 9 MMOL/L (ref 0–18)
AST SERPL-CCNC: 17 U/L (ref 15–37)
ATRIAL RATE: 83 BPM
BASOPHILS # BLD AUTO: 0.08 X10(3) UL (ref 0–0.2)
BASOPHILS NFR BLD AUTO: 0.7 %
BILIRUB SERPL-MCNC: 0.2 MG/DL (ref 0.1–2)
BUN BLD-MCNC: 16 MG/DL (ref 7–18)
BUN/CREAT SERPL: 16.7 (ref 10–20)
CALCIUM BLD-MCNC: 10.5 MG/DL (ref 8.5–10.1)
CHLORIDE SERPL-SCNC: 112 MMOL/L (ref 98–112)
CO2 SERPL-SCNC: 20 MMOL/L (ref 21–32)
CREAT BLD-MCNC: 0.96 MG/DL
DEPRECATED RDW RBC AUTO: 42.3 FL (ref 35.1–46.3)
EOSINOPHIL # BLD AUTO: 0.36 X10(3) UL (ref 0–0.7)
EOSINOPHIL NFR BLD AUTO: 3.3 %
ERYTHROCYTE [DISTWIDTH] IN BLOOD BY AUTOMATED COUNT: 13.3 % (ref 11–15)
EST. AVERAGE GLUCOSE BLD GHB EST-MCNC: 108 MG/DL (ref 68–126)
FASTING STATUS PATIENT QL REPORTED: NO
GFR SERPLBLD BASED ON 1.73 SQ M-ARVRAT: 73 ML/MIN/1.73M2 (ref 60–?)
GLOBULIN PLAS-MCNC: 3.8 G/DL (ref 2.8–4.4)
GLUCOSE BLD-MCNC: 111 MG/DL (ref 70–99)
HBA1C MFR BLD: 5.4 % (ref ?–5.7)
HCT VFR BLD AUTO: 44.8 %
HGB BLD-MCNC: 14.5 G/DL
IMM GRANULOCYTES # BLD AUTO: 0.06 X10(3) UL (ref 0–1)
IMM GRANULOCYTES NFR BLD: 0.5 %
LYMPHOCYTES # BLD AUTO: 4.25 X10(3) UL (ref 1–4)
LYMPHOCYTES NFR BLD AUTO: 38.8 %
MCH RBC QN AUTO: 28.1 PG (ref 26–34)
MCHC RBC AUTO-ENTMCNC: 32.4 G/DL (ref 31–37)
MCV RBC AUTO: 86.8 FL
MONOCYTES # BLD AUTO: 0.61 X10(3) UL (ref 0.1–1)
MONOCYTES NFR BLD AUTO: 5.6 %
NEUTROPHILS # BLD AUTO: 5.59 X10 (3) UL (ref 1.5–7.7)
NEUTROPHILS # BLD AUTO: 5.59 X10(3) UL (ref 1.5–7.7)
NEUTROPHILS NFR BLD AUTO: 51.1 %
OSMOLALITY SERPL CALC.SUM OF ELEC: 294 MOSM/KG (ref 275–295)
P AXIS: 34 DEGREES
P-R INTERVAL: 160 MS
PLATELET # BLD AUTO: 356 10(3)UL (ref 150–450)
POTASSIUM SERPL-SCNC: 3.6 MMOL/L (ref 3.5–5.1)
PROT SERPL-MCNC: 7.4 G/DL (ref 6.4–8.2)
Q-T INTERVAL: 372 MS
QRS DURATION: 88 MS
QTC CALCULATION (BEZET): 437 MS
R AXIS: 37 DEGREES
RBC # BLD AUTO: 5.16 X10(6)UL
SODIUM SERPL-SCNC: 141 MMOL/L (ref 136–145)
T AXIS: 43 DEGREES
TSI SER-ACNC: 1.1 MIU/ML (ref 0.36–3.74)
VENTRICULAR RATE: 83 BPM
WBC # BLD AUTO: 11 X10(3) UL (ref 4–11)

## 2023-06-20 PROCEDURE — 84080 ASSAY ALKALINE PHOSPHATASES: CPT

## 2023-06-20 PROCEDURE — 83036 HEMOGLOBIN GLYCOSYLATED A1C: CPT

## 2023-06-20 PROCEDURE — 93005 ELECTROCARDIOGRAM TRACING: CPT

## 2023-06-20 PROCEDURE — 36415 COLL VENOUS BLD VENIPUNCTURE: CPT

## 2023-06-20 PROCEDURE — 84443 ASSAY THYROID STIM HORMONE: CPT

## 2023-06-20 PROCEDURE — 93010 ELECTROCARDIOGRAM REPORT: CPT | Performed by: INTERNAL MEDICINE

## 2023-06-20 PROCEDURE — 85025 COMPLETE CBC W/AUTO DIFF WBC: CPT

## 2023-06-20 PROCEDURE — 80053 COMPREHEN METABOLIC PANEL: CPT

## 2023-06-21 DIAGNOSIS — R79.89 ABNORMAL CBC: Primary | ICD-10-CM

## 2023-06-21 DIAGNOSIS — E83.52 HYPERCALCEMIA: Primary | ICD-10-CM

## 2023-06-21 NOTE — TELEPHONE ENCOUNTER
From: Rosalie Spurling  To: [facility]. Sachin Rhoades MD  Sent: 6/20/2023 6:36 PM CDT  Subject: Question regarding CBC W/ DIFFERENTIAL    What about my white blood count and my lymphocytes. Those were elevated slightly. Are these ok?     Thanks  Frankie Pastor

## 2023-06-21 NOTE — TELEPHONE ENCOUNTER
Dr. Linda Manzanares, patient is concerned about her ALC. History of leukocytosis. Would you like her to repeat any labs or follow up?

## 2023-06-23 LAB — ALKALINE PHOSPHATASE BONE SPECIFIC: 24.5 UG/L

## 2023-06-27 ENCOUNTER — TELEPHONE (OUTPATIENT)
Dept: INTERNAL MEDICINE CLINIC | Facility: CLINIC | Age: 48
End: 2023-06-27

## 2023-06-28 ENCOUNTER — LAB REQUISITION (OUTPATIENT)
Dept: SURGERY | Age: 48
End: 2023-06-28
Payer: COMMERCIAL

## 2023-06-28 DIAGNOSIS — L98.9 SKIN LESION: ICD-10-CM

## 2023-06-28 PROCEDURE — 88304 TISSUE EXAM BY PATHOLOGIST: CPT | Performed by: OTOLARYNGOLOGY

## 2023-06-28 PROCEDURE — 88305 TISSUE EXAM BY PATHOLOGIST: CPT | Performed by: OTOLARYNGOLOGY

## 2023-06-28 RX ORDER — CEPHALEXIN 500 MG/1
500 CAPSULE ORAL EVERY 8 HOURS
Qty: 21 CAPSULE | Refills: 0 | Status: SHIPPED | OUTPATIENT
Start: 2023-06-28

## 2023-06-29 ENCOUNTER — TELEPHONE (OUTPATIENT)
Dept: OTOLARYNGOLOGY | Facility: CLINIC | Age: 48
End: 2023-06-29

## 2023-06-29 NOTE — TELEPHONE ENCOUNTER
Post op day 1. Spoke to patient, she reports that the left eye brow site is uncomfortable. She is asking if Dr. Chloé Moore could send in a prescription for [address] patient to keep dressing clean and dry, Reviewed signs and symptoms of infection and when to call the office. Post op appointment scheduled. Dr. Chloé Moore, patient is asking for Norco to help with the discomfort. Please advise. Thank you.

## 2023-07-01 RX ORDER — HYDROCODONE BITARTRATE AND ACETAMINOPHEN 5; 325 MG/1; MG/1
1 TABLET ORAL EVERY 6 HOURS PRN
Qty: 20 TABLET | Refills: 0 | Status: SHIPPED | OUTPATIENT
Start: 2023-07-01

## 2023-07-06 ENCOUNTER — OFFICE VISIT (OUTPATIENT)
Dept: OTOLARYNGOLOGY | Facility: CLINIC | Age: 48
End: 2023-07-06

## 2023-07-06 DIAGNOSIS — L72.9 CYST OF SKIN OF EYEBROW: Primary | ICD-10-CM

## 2023-07-06 PROCEDURE — 99024 POSTOP FOLLOW-UP VISIT: CPT | Performed by: OTOLARYNGOLOGY

## 2023-07-06 NOTE — PROGRESS NOTES
Erik Barnes is a 53year old female. Patient presents with:  Post-Op: Patient is here due to Left brow cyst excision and closure. HISTORY OF PRESENT ILLNESS  She presents upon recommendation by her dermatologist for evaluation of neoplasm of uncertain behavior just beneath the eyebrow on the left. This is lateral in the eye and she notes it has been increasing in size. Has a small skin tag of the right eyelid as well. No other signs, symptoms or complaints. 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   Here for suture removal.  No complaints or concerns      Social History    Socioeconomic History      Marital status:     Tobacco Use      Smoking status: Every Day        Packs/day: 0.50        Years: 22.00        Pack years: 11        Types: Cigarettes      Smokeless tobacco: Never    Vaping Use      Vaping Use: Never used    Substance and Sexual Activity      Alcohol use: Yes        Comment: occasional      Drug use: Yes        Types: Cannabis        Comment: has a medical card       Sexual activity: Yes        Birth control/protection: Mirena        Comment: 03/2018    Other Topics      Concerns:        Caffeine Concern: Yes          1 cup daily soda/coffee        Pt has a pacemaker: No        Pt has a defibrillator: No        Breast feeding: No        Reaction to local anesthetic: No      Family History   Problem Relation Age of Onset    Cancer Sister 25        Hodgkin's Lymphoma    Other (Multiple Sclerosis) Father 64    Other (Alive and well) Mother     Breast Cancer Maternal Grandmother 48       Past Medical History:   Diagnosis Date    Asthma     Disorder of liver     Fatty Liver    Essential hypertension     Obesity        Past Surgical History:   Procedure Laterality Date    CHOLECYSTECTOMY  2002    COLONOSCOPY N/A 1/30/2018    Procedure: COLONOSCOPY;  Surgeon: Yony Cruz Marcia Zheng MD;  Location: [address] [place] [address] ENDOSCOPY    COLONOSCOPY N/A 3/30/2021    Procedure: COLONOSCOPY;  Surgeon: Hany Patel MD;  Location: [address] [place] [address] ENDOSCOPY    CYST ASPIRATION LEFT      KNEE ARTHROSCOPY  1994         Jitendra Mattson 0932 Neg/Pos Details   Constitutional Negative Fatigue, fever and weight loss. ENMT Negative Drooling. Eyes Negative Blurred vision and vision changes. Respiratory Negative Dyspnea and wheezing. Cardio Negative Chest pain, irregular heartbeat/palpitations and syncope. GI Negative Abdominal pain and diarrhea. Endocrine Negative Cold intolerance and heat intolerance. Neuro Negative Tremors. Psych Negative Anxiety and depression. Integumentary Negative Frequent skin infections, pigment change and rash. Hema/Lymph Negative Easy bleeding and easy bruising. PHYSICAL EXAM    There were no vitals taken for this visit. Constitutional Normal Overall appearance - Normal.   Psychiatric Normal Orientation - Oriented to time, place, person & situation. Appropriate mood and affect. Neck Exam Normal Inspection - Normal. Palpation - Normal. Parotid gland - Normal. Thyroid gland - Normal.   Eyes Normal Conjunctiva - Right: Normal, Left: Normal. Pupil - Right: Normal, Left: Normal. Fundus - Right: Normal, Left: Normal.   Neurological Normal Memory - Normal. Cranial nerves - Cranial nerves II through XII grossly intact. Head/Face Normal Facial features - Normal. Eyebrows - Normal. Skull - Normal.        Nasopharynx Normal External nose - Normal. Lips/teeth/gums - Normal. Tonsils - Normal. Oropharynx - Normal.   Ears Normal Inspection - Right: Normal, Left: Normal. Canal - Right: Normal, Left: Normal. TM - Right: Normal, Left: Normal.   Skin Normal Inspection - Normal.        Lymph Detail Normal Submental. Submandibular. Anterior cervical. Posterior cervical. Supraclavicular.    Incision  Clean dry and intact   Nose/Mouth/Throat Normal External nose - Normal. Lips/teeth/gums - Normal. Tonsils - Normal. Oropharynx - Normal.   Nose/Mouth/Throat Normal Nares - Right: Normal Left: Normal. Septum -Normal  Turbinates - Right: Normal, Left: Normal.       Current Outpatient Medications:     HYDROcodone-acetaminophen (NORCO) 5-325 MG Oral Tab, Take 1 tablet by mouth every 6 (six) hours as needed. , Disp: 20 tablet, Rfl: 0    cephalexin 500 MG Oral Cap, Take 1 capsule (500 mg total) by mouth every 8 (eight) hours. , Disp: 21 capsule, Rfl: 0    TOPIRAMATE 25 MG Oral Tab, TAKE 1 TABLET BY MOUTH TWICE A DAY, Disp: 180 tablet, Rfl: 1    albuterol 108 (90 Base) MCG/ACT Inhalation Aero Soln, Inhale 2 puffs into the lungs every 6 (six) hours as needed for Wheezing or Shortness of Breath., Disp: 3 each, Rfl: 1    HYDROCHLOROTHIAZIDE 12.5 MG Oral Cap, TAKE 1 CAPSULE BY MOUTH EVERY DAY, Disp: 90 capsule, Rfl: 0    Phentermine HCl 37.5 MG Oral Tab, Take 1 tablet (37.5 mg total) by mouth every morning before breakfast., Disp: 30 tablet, Rfl: 2    sertraline 100 MG Oral Tab, TAKE 2 TABLETS (200 MG TOTAL) BY MOUTH AFTER LUNCH. TAKE HALF TABLET FOR THE FIRST WEEK, Disp: , Rfl:     lisinopril 10 MG Oral Tab, Take 1 tablet (10 mg total) by mouth daily. , Disp: 90 tablet, Rfl: 1    Budesonide-Formoterol Fumarate (SYMBICORT) 160-4.5 MCG/ACT Inhalation Aerosol, Inhale 2 puffs into the lungs 2 (two) times daily. , Disp: 30.6 each, Rfl: 3    atenolol 50 MG Oral Tab, Take 1 tablet (50 mg total) by mouth daily. , Disp: 90 tablet, Rfl: 3    NYSTATIN 906344 UNIT/GM External Powder, APPLY 1 APPLICATION TOPICALLY 4 (FOUR) TIMES DAILY. APPLY TO AFFECTED AREAS, Disp: 30 g, Rfl: 1    levonorgestrel 20 MCG/24HR Intrauterine IUD, Use as directed  Mirena Lot [id number]   11-10, Disp: , Rfl:   ASSESSMENT AND PLAN    1.  Cyst of skin of eyebrow  Doing very well healing nicely dressings removed wound care discussed and understood Path discussed benign Leonidas adenoma return to see me as needed        This note was prepared using [employer] [facility] [employer] voice recognition dictation software. As a result errors may occur. When identified these errors have been corrected. While every attempt is made to correct errors during dictation discrepancies may still exist    Arron Quiñones MD    7/6/2023    8:30 AM

## 2023-07-17 ENCOUNTER — LAB ENCOUNTER (OUTPATIENT)
Dept: LAB | Age: 48
End: 2023-07-17
Attending: FAMILY MEDICINE
Payer: COMMERCIAL

## 2023-07-17 DIAGNOSIS — R79.89 ABNORMAL CBC: ICD-10-CM

## 2023-07-17 DIAGNOSIS — E83.52 HYPERCALCEMIA: ICD-10-CM

## 2023-07-17 LAB
ANION GAP SERPL CALC-SCNC: 6 MMOL/L (ref 0–18)
BASOPHILS # BLD AUTO: 0.06 X10(3) UL (ref 0–0.2)
BASOPHILS NFR BLD AUTO: 0.6 %
BUN BLD-MCNC: 21 MG/DL (ref 7–18)
BUN/CREAT SERPL: 18.9 (ref 10–20)
CALCIUM BLD-MCNC: 10.4 MG/DL (ref 8.5–10.1)
CHLORIDE SERPL-SCNC: 110 MMOL/L (ref 98–112)
CO2 SERPL-SCNC: 24 MMOL/L (ref 21–32)
CREAT BLD-MCNC: 1.11 MG/DL
DEPRECATED RDW RBC AUTO: 43.8 FL (ref 35.1–46.3)
EOSINOPHIL # BLD AUTO: 0.2 X10(3) UL (ref 0–0.7)
EOSINOPHIL NFR BLD AUTO: 1.9 %
ERYTHROCYTE [DISTWIDTH] IN BLOOD BY AUTOMATED COUNT: 13.5 % (ref 11–15)
FASTING STATUS PATIENT QL REPORTED: NO
GFR SERPLBLD BASED ON 1.73 SQ M-ARVRAT: 62 ML/MIN/1.73M2 (ref 60–?)
GLUCOSE BLD-MCNC: 107 MG/DL (ref 70–99)
HCT VFR BLD AUTO: 44.9 %
HGB BLD-MCNC: 14.6 G/DL
IMM GRANULOCYTES # BLD AUTO: 0.12 X10(3) UL (ref 0–1)
IMM GRANULOCYTES NFR BLD: 1.1 %
LYMPHOCYTES # BLD AUTO: 3.62 X10(3) UL (ref 1–4)
LYMPHOCYTES NFR BLD AUTO: 33.5 %
MCH RBC QN AUTO: 28.7 PG (ref 26–34)
MCHC RBC AUTO-ENTMCNC: 32.5 G/DL (ref 31–37)
MCV RBC AUTO: 88.4 FL
MONOCYTES # BLD AUTO: 0.59 X10(3) UL (ref 0.1–1)
MONOCYTES NFR BLD AUTO: 5.5 %
NEUTROPHILS # BLD AUTO: 6.22 X10 (3) UL (ref 1.5–7.7)
NEUTROPHILS # BLD AUTO: 6.22 X10(3) UL (ref 1.5–7.7)
NEUTROPHILS NFR BLD AUTO: 57.4 %
OSMOLALITY SERPL CALC.SUM OF ELEC: 293 MOSM/KG (ref 275–295)
PLATELET # BLD AUTO: 329 10(3)UL (ref 150–450)
POTASSIUM SERPL-SCNC: 3.9 MMOL/L (ref 3.5–5.1)
PTH-INTACT SERPL-MCNC: 84.3 PG/ML (ref 18.5–88)
RBC # BLD AUTO: 5.08 X10(6)UL
SODIUM SERPL-SCNC: 140 MMOL/L (ref 136–145)
WBC # BLD AUTO: 10.8 X10(3) UL (ref 4–11)

## 2023-07-17 PROCEDURE — 85025 COMPLETE CBC W/AUTO DIFF WBC: CPT

## 2023-07-17 PROCEDURE — 36415 COLL VENOUS BLD VENIPUNCTURE: CPT

## 2023-07-17 PROCEDURE — 83970 ASSAY OF PARATHORMONE: CPT

## 2023-07-17 PROCEDURE — 80048 BASIC METABOLIC PNL TOTAL CA: CPT

## 2023-07-22 RX ORDER — FLUCONAZOLE 150 MG/1
150 TABLET ORAL ONCE
Qty: 1 TABLET | Refills: 0 | OUTPATIENT
Start: 2023-07-22 | End: 2023-07-22

## 2023-07-24 RX ORDER — CEPHALEXIN 500 MG/1
500 CAPSULE ORAL EVERY 8 HOURS
Qty: 21 CAPSULE | Refills: 0 | OUTPATIENT
Start: 2023-07-24

## 2023-08-06 DIAGNOSIS — F43.9 REACTION TO SEVERE STRESS, UNSPECIFIED: ICD-10-CM

## 2023-08-06 DIAGNOSIS — R63.2 POLYPHAGIA: ICD-10-CM

## 2023-08-07 RX ORDER — SERTRALINE HYDROCHLORIDE 100 MG/1
100 TABLET, FILM COATED ORAL DAILY
Qty: 90 TABLET | Refills: 2 | Status: SHIPPED | OUTPATIENT
Start: 2023-08-07 | End: 2023-11-05

## 2023-08-21 DIAGNOSIS — I10 ESSENTIAL HYPERTENSION: ICD-10-CM

## 2023-08-22 DIAGNOSIS — E66.01 MORBID OBESITY WITH BMI OF 40.0-44.9, ADULT (HCC): Primary | ICD-10-CM

## 2023-08-22 RX ORDER — HYDROCHLOROTHIAZIDE 12.5 MG/1
CAPSULE, GELATIN COATED ORAL
Qty: 90 CAPSULE | Refills: 0 | Status: SHIPPED | OUTPATIENT
Start: 2023-08-22

## 2023-08-28 ENCOUNTER — TELEPHONE (OUTPATIENT)
Dept: SURGERY | Facility: CLINIC | Age: 48
End: 2023-08-28

## 2023-08-28 DIAGNOSIS — E66.01 MORBID OBESITY WITH BMI OF 40.0-44.9, ADULT (HCC): ICD-10-CM

## 2023-08-28 DIAGNOSIS — I10 ESSENTIAL HYPERTENSION: Primary | ICD-10-CM

## 2023-08-28 NOTE — TELEPHONE ENCOUNTER
Left voicemail for patient letting her know that referral for surgical consult that Dr. Melchor Richmond has entered has been closed. Patient must see a Dietitian for a minimum of 1 visit before surgical referral can be approved. Encouraged patient to call back at her convenience to schedule Dietitian appt. Dr. Melchor Richmond was also notified.

## 2023-09-07 ENCOUNTER — OFFICE VISIT (OUTPATIENT)
Dept: SURGERY | Facility: CLINIC | Age: 48
End: 2023-09-07
Payer: COMMERCIAL

## 2023-09-07 VITALS
BODY MASS INDEX: 41.88 KG/M2 | HEART RATE: 70 BPM | OXYGEN SATURATION: 96 % | WEIGHT: 236.38 LBS | DIASTOLIC BLOOD PRESSURE: 75 MMHG | HEIGHT: 63 IN | SYSTOLIC BLOOD PRESSURE: 117 MMHG

## 2023-09-07 DIAGNOSIS — I10 ESSENTIAL HYPERTENSION: Primary | ICD-10-CM

## 2023-09-07 DIAGNOSIS — K76.0 FATTY LIVER: ICD-10-CM

## 2023-09-07 DIAGNOSIS — F43.9 REACTION TO SEVERE STRESS, UNSPECIFIED: ICD-10-CM

## 2023-09-07 DIAGNOSIS — R63.2 BINGE EATING: ICD-10-CM

## 2023-09-07 DIAGNOSIS — R63.2 POLYPHAGIA: ICD-10-CM

## 2023-09-07 DIAGNOSIS — E66.01 MORBID OBESITY WITH BMI OF 40.0-44.9, ADULT (HCC): ICD-10-CM

## 2023-09-07 DIAGNOSIS — E88.81 INSULIN RESISTANCE: ICD-10-CM

## 2023-09-07 PROBLEM — E88.819 INSULIN RESISTANCE: Status: ACTIVE | Noted: 2023-09-07

## 2023-09-07 PROCEDURE — 3074F SYST BP LT 130 MM HG: CPT | Performed by: INTERNAL MEDICINE

## 2023-09-07 PROCEDURE — 3078F DIAST BP <80 MM HG: CPT | Performed by: INTERNAL MEDICINE

## 2023-09-07 PROCEDURE — 3008F BODY MASS INDEX DOCD: CPT | Performed by: INTERNAL MEDICINE

## 2023-09-07 PROCEDURE — 99214 OFFICE O/P EST MOD 30 MIN: CPT | Performed by: INTERNAL MEDICINE

## 2023-09-07 RX ORDER — SERTRALINE HYDROCHLORIDE 100 MG/1
200 TABLET, FILM COATED ORAL DAILY
Qty: 180 TABLET | Refills: 0 | Status: SHIPPED | OUTPATIENT
Start: 2023-09-07 | End: 2023-12-06

## 2023-09-07 RX ORDER — PHENTERMINE HYDROCHLORIDE 37.5 MG/1
37.5 TABLET ORAL
Qty: 30 TABLET | Refills: 2 | Status: SHIPPED | OUTPATIENT
Start: 2023-09-07

## 2023-09-20 ENCOUNTER — OFFICE VISIT (OUTPATIENT)
Dept: SURGERY | Facility: CLINIC | Age: 48
End: 2023-09-20
Payer: COMMERCIAL

## 2023-09-20 DIAGNOSIS — E66.01 MORBID OBESITY WITH BMI OF 40.0-44.9, ADULT (HCC): Primary | ICD-10-CM

## 2023-09-20 DIAGNOSIS — E78.5 DYSLIPIDEMIA: ICD-10-CM

## 2023-09-20 DIAGNOSIS — I10 ESSENTIAL HYPERTENSION: ICD-10-CM

## 2023-09-20 DIAGNOSIS — E88.81 INSULIN RESISTANCE: ICD-10-CM

## 2023-09-20 PROCEDURE — 97802 MEDICAL NUTRITION INDIV IN: CPT | Performed by: DIETITIAN, REGISTERED

## 2023-09-20 NOTE — PROGRESS NOTES
[facility]  [address] [address]  Dept: [phone]  Loc: [phone]    09/20/23    Bariatric Initial Nutrition Assessment    Shira Yates is a 53year old female. Referring Physician: Garfield Guevara  Reason for MNT Referral: Initial assessment for: Gastric Bypass      Assessment   Medical Diagnosis:   obesity grade III, HTN, dyslipidemia, insulin resistance    Patient Active Problem List:     Essential hypertension     Viral warts     Morbid obesity with BMI of 40.0-44.9, adult ([address]     Acanthosis nigricans, acquired     Tobacco abuse     Colitis     Adenomatous polyp of colon     Lipohypertrophy     Bilateral leg edema     Vasodilatation (HCC)     Mild intermittent asthma without complication     Weight gain     Dyslipidemia     Fatty liver     Binge eating     Stress     Intertrigo     Mild persistent asthma without complication     Liver mass     Elevated ALT measurement     Elevated alkaline phosphatase level     Vitamin B12 deficiency     Vitamin D deficiency     Leukocytosis     Hypertriglyceridemia     Fatigue     Sleepiness     Hypercholesterolemia     Insulin resistance      Past Medical History:   Past Medical History:   Diagnosis Date    Asthma     Disorder of liver     Fatty Liver    Essential hypertension     Obesity        Weight history:  Struggled with weight  most of life, Pt's highest adult weight 283# at 23 y/o, Pt's lowest adult weight 170# at 27 y/o, and Reason given for weight gain:  Struggled with weight most of his/her life, Poor food choices, Sedentary lifestyle, Emotional, stress eating, and Other: eating not when hungry, over eating. Patient has tried: Low carbohydrate, Low fat, Calorie counting : apps, [employer], Slim Mee Chavis, [employer], Jaye Mueller, Parker, and Other: weighing food, smart ones food/lean cuisine.     Patient's most successful weight loss attempt was keto/ordered keto meals. Lost 66 lbs  and kept it off 12 months. Patient has tried these medications for weight loss: Ionamin/Adipex/Phentermine, Other: metabolife, saxenda, and Topamax/Topriamate    Patient has received these behavioral treatments for weight loss: None    Patient is being followed by Dr. Jaime Yepez for medical weight loss. Patient has completed medical weight management Yes    Patient has support from: Family and Primary care physician    Patient acknowledges binge eating behavior: Eats a larger amount of food than normal in a short period of time. , Eats until uncomfortably full., Eats large amounts of food when not physically hungry. , Feels disgusted, depressed, or guilty after overeating., and Hides the amounts of foods eaten from others. Patient engages in binge-purge behavior: no    Patient uses laxatives or vomits as a means of purging: no    Patient complains of: diarrhea constipation heart burn flatuence stomach grumbling abdominal pain    Patient's motivation for bariatric surgery: Just for myself. To feel healthier. Have tried for so long to lose weight. Allergies:  No Known Allergies    Height:  Ht Readings from Last 1 Encounters:  09/07/23 : 5' 3\" (1.6 m)      Weight:  Wt Readings from Last 6 Encounters:  09/07/23 : 236 lb 6.4 oz (107.2 kg)  05/23/23 : 238 lb (108 kg)  05/04/23 : 235 lb 1.6 oz (106.6 kg)  04/20/23 : 240 lb (108.9 kg)  03/31/23 : 234 lb (106.1 kg)  03/15/23 : 225 lb (102.1 kg)      IBW:  Patient weight not recorded  BMI: There is no height or weight on file to calculate BMI.  Obesity Grade III, greater than or equal to 40    Diet Rx: watching what eating, on feet moving back at work    Labs:    Lab Results   Component Value Date     (H) 07/17/2023    BUN 21 (H) 07/17/2023    BUNCREA 18.9 07/17/2023    CREATSERUM 1.11 (H) 07/17/2023    ANIONGAP 6 07/17/2023    GFRNAA 105 02/12/2022    GFRAA 121 02/12/2022    CA 10.4 (H) 07/17/2023    Dion 496 293 07/17/2023 ALKPHO 142 (H) 06/20/2023    AST 17 06/20/2023    ALT 56 06/20/2023    BILT 0.2 06/20/2023    TP 7.4 06/20/2023    ALB 3.6 06/20/2023    GLOBULIN 3.8 06/20/2023    AGRATIO 1.3 05/30/2006     07/17/2023    K 3.9 07/17/2023     07/17/2023    CO2 24.0 07/17/2023     Lab Results   Component Value Date    MG 1.7 (L) 01/30/2018     Phosphorus (mg/dL)   Date Value   01/30/2018 2.7       Thyroid:    Lab Results   Component Value Date    TSH 1.100 06/20/2023    TSH 1.940 10/10/2022    TSH 0.653 07/02/2021    T4F 1.0 07/01/2020       Iron Panel: No results found for: \"IRON\", \"IRONTOT\", \"TIBC\", \"IRONSAT\", \"TRANSFERRIN\", \"TIBCP\", \"IRONBIND\", \"SAT\", \"SATUR\"    Diabetes:    Lab Results   Component Value Date     06/20/2023    A1C 5.4 06/20/2023       Lipid Panel:   Lab Results   Component Value Date    CHOLEST 236 (H) 03/29/2023    TRIG 309 (H) 03/29/2023    HDL 46 03/29/2023     (H) 03/29/2023    VLDL 58 (H) 03/29/2023    NONHDLC 190 (H) 03/29/2023       Vitamins/Minerals:  Lab Results   Component Value Date    B12 261 10/10/2022     Lab Results   Component Value Date    VITD 18.2 (L) 10/10/2022     No results found for: \"THIAMINE\"   No results found for: \"VITB1\"  No results found for: \"FOLIC\"    Relevant Meds:      Current Outpatient Medications:     Phentermine HCl 37.5 MG Oral Tab, Take 1 tablet (37.5 mg total) by mouth every morning before breakfast., Disp: 30 tablet, Rfl: 2    sertraline 100 MG Oral Tab, Take 2 tablets (200 mg total) by mouth daily. , Disp: 180 tablet, Rfl: 0    HYDROCHLOROTHIAZIDE 12.5 MG Oral Cap, TAKE 1 CAPSULE BY MOUTH EVERY DAY, Disp: 90 capsule, Rfl: 0    HYDROcodone-acetaminophen (NORCO) 5-325 MG Oral Tab, Take 1 tablet by mouth every 6 (six) hours as needed. , Disp: 20 tablet, Rfl: 0    cephalexin 500 MG Oral Cap, Take 1 capsule (500 mg total) by mouth every 8 (eight) hours. , Disp: 21 capsule, Rfl: 0    TOPIRAMATE 25 MG Oral Tab, TAKE 1 TABLET BY MOUTH TWICE A DAY, Disp: 180 tablet, Rfl: 1    albuterol 108 (90 Base) MCG/ACT Inhalation Aero Soln, Inhale 2 puffs into the lungs every 6 (six) hours as needed for Wheezing or Shortness of Breath., Disp: 3 each, Rfl: 1    lisinopril 10 MG Oral Tab, Take 1 tablet (10 mg total) by mouth daily. , Disp: 90 tablet, Rfl: 1    Budesonide-Formoterol Fumarate (SYMBICORT) 160-4.5 MCG/ACT Inhalation Aerosol, Inhale 2 puffs into the lungs 2 (two) times daily. , Disp: 30.6 each, Rfl: 3    atenolol 50 MG Oral Tab, Take 1 tablet (50 mg total) by mouth daily. , Disp: 90 tablet, Rfl: 3    NYSTATIN 769664 UNIT/GM External Powder, APPLY 1 APPLICATION TOPICALLY 4 (FOUR) TIMES DAILY. APPLY TO AFFECTED AREAS, Disp: 30 g, Rfl: 1    levonorgestrel 20 MCG/24HR Intrauterine IUD, Use as directed  Mirena Lot [id number]   11-10, Disp: , Rfl:     Vitamins/Minerals: Other: none  Food Intolerances: Other: eggplant, spicy foods  Food Allergies:  none  Smoker:     Smoking status:   Every Day   0.50 Packs/day   For 22.00 Years      Types:   Cigarettes      Smokeless tobacco:   Never       Alcohol Intake:    Alcohol use   Yes      Comment:   occasional          Drugs:    Drug use:         Types:   Cannabis      Comment:   has a medical card           Estimated Kcal Needs (Silvina Vera): 2305 kcal/day  Daily calorie goal range for weight loss: 1305 to 1805 kcal/day kcal/day   Estimated Protein Needs:  65-75 grams/day  Estimated Fluid Needs: Aim for 64 ozs per day    Diet history:       Breakfast      AM Snack       Lunch     PM Snack     Dinner Evening Snack   Skip    Coffee-1 (french vanilla creamer) Cheese stick Salad-pepperoni, cheese    Tuna sandwich, smart pop or pretzels w/hummus, pear or watermelon skip Chicken breast w/veggies    Chicken kiev    Lasagna    Pasta dish: not always meat    Pizza 1-2 slices     If stressful day: candy bar   Meal pattern consists of 2 meals, 1 snacks and 0 protein supplements.   Water-80oz/day, 1 can soda per day    Total Kcal: ???  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 Patient has struggled with weight her whole life. Patient has tried many weight loss attempts and was most successful with keto, losing 66# and keeping it off for 1 year. Patient has been working with Dr. Carlos Santos for awhile now. Has also been working with Chilo Daly since January. Patient has attended bariatric seminar. Patient has 2 meals and 1-2 snacks per day depending on stress level of the day. Patient reported binge eating behaviors such as eating large amounts when not hungry and hiding food from others. Patient has no issues getting in water. Does drink 1 cup of coffee and 1 coke per day. Understands the need to eliminate caffeine and carbonation prior to surgery. Patient reported she does smoke, both nicotine and thc. Understands the need to eliminate. 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 Patient appears motivated for changes. Has been very successful before. Reviewed bariatric binder and answered patient's questions. Set goals to work on. Nutrition Diagnosis: Morbid obesity: Unresolved    Intervention     1. Nutrition Rx:  Reviewed  bariatric meal plan, Discussed portion control, Reviewed Bariatric Diet Stages 1-4, Discussed goals, and Obtain MVI  2. Coordination of care: attend support group prior to surgery; reminder for importance of multidisciplinary consultations. 3.  Materials given: [facility] Bariatric Manual and Goals Handout    Goals:   1. Keep a food record, My Net Diary. 2.  Strive to consume at least 4-6 meals/snacks per day. Include protein and produce when you eat. Aim for 65-75 grams of protein per day. Try to keep the carbohydrates at 120 grams per day or less. 3.  Practice eating strategies, eat separately from drinking, avoid straws, chew food 20-30 times before swallowing. Make the meals last 30 minutes. 4.  Continue to drink 64 oz per day of water. (Try  Protein water, adding True Lemon, Crystal Light). 5.  Taper caffeine and carbonation. 6.  Exercise and strength train with a goal of 30 minutes per day for exercise (for example,walking). Strength training 10 minutes 3 days per week. Monitor/Evaluate     Food/fluid intake/choices  Nutrition Rx  Anthropometric measurements  Body composition-InBody Testing at next appointment per surgeon recommendation  Updated labs  F/U MD plan of care  F/U to reinforce goals  F/U on vitamin/mineral supplementation  Review quizzes   for liquid protein diet prior to surgery    Additional RD visits required to review concepts? yes  Patient understands protein requirements? yes  Patient understand fluid requirements (amount and method of intake)? yes  Patient understands post-operative diet? yes  Patient ready for Liquid Protein Education?  no    Mook Me RD, LDN  Face-to-face time spent with pt: 90 minutes

## 2023-09-20 NOTE — PATIENT INSTRUCTIONS
Goals: 1. Keep a food record, My Net Diary. 2.  Strive to consume at least 4-6 meals/snacks per day. Include protein and produce when you eat. Aim for 65-75 grams of protein per day. Try to keep the carbohydrates at 120 grams per day or less. 3.  Practice eating strategies, eat separately from drinking, avoid straws, chew food 20-30 times before swallowing. Make the meals last 30 minutes. 4.  Continue to drink 64 oz per day of water. (Try  Protein water, adding True Lemon, Crystal Light). 5.  Taper caffeine and carbonation. 6.  Exercise and strength train with a goal of 30 minutes per day for exercise (for example,walking). Strength training 10 minutes 3 days per week.

## 2023-09-21 ENCOUNTER — TELEPHONE (OUTPATIENT)
Dept: CASE MANAGEMENT | Age: 48
End: 2023-09-21

## 2023-09-21 DIAGNOSIS — E66.01 MORBID OBESITY WITH BMI OF 45.0-49.9, ADULT (HCC): Primary | ICD-10-CM

## 2023-09-21 NOTE — TELEPHONE ENCOUNTER
Santiago Zimmerman,    I am working on the referral submitted for Mevelyn Salvage to see Dr. Nereida Espinoza. Is this OV for consultation in regards to bariatric surgery? In her chart it states:        Please advise.     Thank you  Mainor Delgado

## 2023-10-03 ENCOUNTER — OFFICE VISIT (OUTPATIENT)
Dept: FAMILY MEDICINE CLINIC | Facility: CLINIC | Age: 48
End: 2023-10-03

## 2023-10-03 VITALS
WEIGHT: 236 LBS | SYSTOLIC BLOOD PRESSURE: 124 MMHG | BODY MASS INDEX: 41.82 KG/M2 | OXYGEN SATURATION: 96 % | DIASTOLIC BLOOD PRESSURE: 79 MMHG | HEART RATE: 78 BPM | HEIGHT: 63 IN

## 2023-10-03 DIAGNOSIS — E66.01 MORBID OBESITY WITH BMI OF 40.0-44.9, ADULT (HCC): ICD-10-CM

## 2023-10-03 DIAGNOSIS — E78.1 HYPERTRIGLYCERIDEMIA: ICD-10-CM

## 2023-10-03 DIAGNOSIS — Z97.5 IUD CONTRACEPTION: ICD-10-CM

## 2023-10-03 DIAGNOSIS — Z00.00 WELL ADULT EXAM: Primary | ICD-10-CM

## 2023-10-03 DIAGNOSIS — D12.6 ADENOMATOUS POLYP OF COLON, UNSPECIFIED PART OF COLON: ICD-10-CM

## 2023-10-03 DIAGNOSIS — E65 LIPOHYPERTROPHY: ICD-10-CM

## 2023-10-03 DIAGNOSIS — E78.00 HYPERCHOLESTEROLEMIA: ICD-10-CM

## 2023-10-03 DIAGNOSIS — Z72.0 TOBACCO ABUSE: ICD-10-CM

## 2023-10-03 DIAGNOSIS — I10 ESSENTIAL HYPERTENSION: ICD-10-CM

## 2023-10-03 DIAGNOSIS — E53.8 VITAMIN B12 DEFICIENCY: ICD-10-CM

## 2023-10-03 DIAGNOSIS — E55.9 VITAMIN D DEFICIENCY: ICD-10-CM

## 2023-10-03 DIAGNOSIS — J45.30 MILD PERSISTENT ASTHMA WITHOUT COMPLICATION: ICD-10-CM

## 2023-10-03 DIAGNOSIS — E88.819 INSULIN RESISTANCE: ICD-10-CM

## 2023-10-03 PROCEDURE — 3074F SYST BP LT 130 MM HG: CPT | Performed by: FAMILY MEDICINE

## 2023-10-03 PROCEDURE — 99213 OFFICE O/P EST LOW 20 MIN: CPT | Performed by: FAMILY MEDICINE

## 2023-10-03 PROCEDURE — 3078F DIAST BP <80 MM HG: CPT | Performed by: FAMILY MEDICINE

## 2023-10-03 PROCEDURE — 99396 PREV VISIT EST AGE 40-64: CPT | Performed by: FAMILY MEDICINE

## 2023-10-03 PROCEDURE — 3008F BODY MASS INDEX DOCD: CPT | Performed by: FAMILY MEDICINE

## 2023-10-03 RX ORDER — LISINOPRIL 10 MG/1
10 TABLET ORAL DAILY
Qty: 90 TABLET | Refills: 3 | Status: SHIPPED | OUTPATIENT
Start: 2023-10-03

## 2023-10-03 RX ORDER — HYDROCHLOROTHIAZIDE 12.5 MG/1
12.5 CAPSULE, GELATIN COATED ORAL DAILY
Qty: 90 CAPSULE | Refills: 3 | Status: SHIPPED | OUTPATIENT
Start: 2023-10-03

## 2023-10-03 RX ORDER — FLUTICASONE PROPIONATE AND SALMETEROL 250; 50 UG/1; UG/1
1 POWDER RESPIRATORY (INHALATION) EVERY 12 HOURS SCHEDULED
Qty: 3 EACH | Refills: 3 | Status: SHIPPED | OUTPATIENT
Start: 2023-10-03

## 2023-10-18 DIAGNOSIS — E66.01 MORBID OBESITY WITH BMI OF 40.0-44.9, ADULT (HCC): ICD-10-CM

## 2023-10-18 RX ORDER — PHENTERMINE HYDROCHLORIDE 37.5 MG/1
37.5 TABLET ORAL
Qty: 30 TABLET | Refills: 2 | Status: SHIPPED | OUTPATIENT
Start: 2023-10-18

## 2023-11-03 ENCOUNTER — PATIENT OUTREACH (OUTPATIENT)
Dept: CASE MANAGEMENT | Age: 48
End: 2023-11-03

## 2023-11-03 NOTE — PROCEDURES
The office order for PCP removal request is Approved and finalized on November 3, 2023. P Attributed PCP is Dr. Kimberly Olivo.     Thanks,  [facility] [employer]

## 2023-11-07 DIAGNOSIS — I10 ESSENTIAL HYPERTENSION: ICD-10-CM

## 2023-11-07 DIAGNOSIS — R00.0 TACHYCARDIA: ICD-10-CM

## 2023-11-07 RX ORDER — ATENOLOL 50 MG/1
50 TABLET ORAL DAILY
Qty: 90 TABLET | Refills: 3 | OUTPATIENT
Start: 2023-11-07

## 2023-11-14 DIAGNOSIS — E66.01 MORBID OBESITY WITH BMI OF 40.0-44.9, ADULT (HCC): ICD-10-CM

## 2023-11-14 RX ORDER — PHENTERMINE HYDROCHLORIDE 37.5 MG/1
37.5 TABLET ORAL
Qty: 30 TABLET | Refills: 2 | Status: SHIPPED | OUTPATIENT
Start: 2023-11-14

## 2023-11-24 ENCOUNTER — HOSPITAL ENCOUNTER (OUTPATIENT)
Dept: MAMMOGRAPHY | Age: 48
Discharge: HOME | End: 2023-11-24
Attending: OBSTETRICS & GYNECOLOGY
Payer: COMMERCIAL

## 2023-11-24 DIAGNOSIS — Z12.31 ENCOUNTER FOR SCREENING MAMMOGRAM FOR BREAST CANCER: ICD-10-CM

## 2023-11-24 PROCEDURE — 77067 SCR MAMMO BI INCL CAD: CPT | Performed by: OBSTETRICS & GYNECOLOGY

## 2023-11-24 PROCEDURE — 77063 BREAST TOMOSYNTHESIS BI: CPT | Performed by: OBSTETRICS & GYNECOLOGY

## 2023-12-05 ENCOUNTER — PATIENT MESSAGE (OUTPATIENT)
Dept: SURGERY | Facility: CLINIC | Age: 48
End: 2023-12-05

## 2023-12-05 DIAGNOSIS — E66.01 MORBID OBESITY WITH BMI OF 40.0-44.9, ADULT (HCC): Primary | ICD-10-CM

## 2023-12-05 RX ORDER — TIRZEPATIDE 2.5 MG/.5ML
2.5 INJECTION, SOLUTION SUBCUTANEOUS WEEKLY
Qty: 3 ML | Refills: 0 | Status: SHIPPED | OUTPATIENT
Start: 2023-12-05

## 2023-12-05 NOTE — TELEPHONE ENCOUNTER
From: Tyree Schwab  To: Stevie Leyva  Sent: 12/5/2023 12:39 AM CST  Subject: Medication     Hello,    Is [facility] available yet? [facility] is[facility]        Thanks  Cali Ferreira

## 2023-12-07 ENCOUNTER — OFFICE VISIT (OUTPATIENT)
Dept: FAMILY MEDICINE CLINIC | Facility: CLINIC | Age: 48
End: 2023-12-07

## 2023-12-07 VITALS
OXYGEN SATURATION: 97 % | TEMPERATURE: 99 F | WEIGHT: 236 LBS | HEART RATE: 116 BPM | HEIGHT: 63 IN | SYSTOLIC BLOOD PRESSURE: 131 MMHG | DIASTOLIC BLOOD PRESSURE: 85 MMHG | BODY MASS INDEX: 41.82 KG/M2

## 2023-12-07 DIAGNOSIS — S39.012A STRAIN OF LUMBAR REGION, INITIAL ENCOUNTER: Primary | ICD-10-CM
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
## 2023-12-07 DIAGNOSIS — M25.551 RIGHT HIP PAIN: ICD-10-CM

## 2023-12-07 PROCEDURE — 99214 OFFICE O/P EST MOD 30 MIN: CPT | Performed by: FAMILY MEDICINE

## 2023-12-07 RX ORDER — CYCLOBENZAPRINE HCL 10 MG
10 TABLET ORAL NIGHTLY PRN
Qty: 30 TABLET | Refills: 0 | Status: SHIPPED | OUTPATIENT
Start: 2023-12-07 | End: 2023-12-27

## 2023-12-07 RX ORDER — NAPROXEN 500 MG/1
500 TABLET ORAL 2 TIMES DAILY WITH MEALS
Qty: 60 TABLET | Refills: 0 | Status: SHIPPED | OUTPATIENT
Start: 2023-12-07

## 2023-12-07 RX ORDER — BUDESONIDE AND FORMOTEROL FUMARATE DIHYDRATE 160; 4.5 UG/1; UG/1
AEROSOL RESPIRATORY (INHALATION)
COMMUNITY
Start: 2023-10-18

## 2023-12-08 ENCOUNTER — HOSPITAL ENCOUNTER (OUTPATIENT)
Dept: GENERAL RADIOLOGY | Age: 48
Discharge: HOME | End: 2023-12-08
Attending: FAMILY MEDICINE
Payer: OTHER MISCELLANEOUS

## 2023-12-08 DIAGNOSIS — M25.551 RIGHT HIP PAIN: ICD-10-CM

## 2023-12-08 DIAGNOSIS — S39.012A STRAIN OF LUMBAR REGION, INITIAL ENCOUNTER: ICD-10-CM

## 2023-12-08 PROCEDURE — 73502 X-RAY EXAM HIP UNI 2-3 VIEWS: CPT | Performed by: FAMILY MEDICINE

## 2023-12-08 PROCEDURE — 72100 X-RAY EXAM L-S SPINE 2/3 VWS: CPT | Performed by: FAMILY MEDICINE

## 2023-12-08 RX ORDER — TOPIRAMATE 25 MG/1
25 TABLET ORAL 2 TIMES DAILY
Qty: 180 TABLET | Refills: 1 | Status: SHIPPED | OUTPATIENT
Start: 2023-12-08

## 2023-12-10 DIAGNOSIS — I10 ESSENTIAL HYPERTENSION: ICD-10-CM

## 2023-12-10 DIAGNOSIS — R00.0 TACHYCARDIA: ICD-10-CM

## 2023-12-10 DIAGNOSIS — F43.9 REACTION TO SEVERE STRESS, UNSPECIFIED: ICD-10-CM

## 2023-12-10 DIAGNOSIS — R63.2 POLYPHAGIA: ICD-10-CM

## 2023-12-11 RX ORDER — ATENOLOL 50 MG/1
50 TABLET ORAL DAILY
Qty: 90 TABLET | Refills: 3 | OUTPATIENT
Start: 2023-12-11

## 2023-12-11 RX ORDER — SERTRALINE HYDROCHLORIDE 100 MG/1
200 TABLET, FILM COATED ORAL DAILY
Qty: 180 TABLET | Refills: 0 | Status: SHIPPED | OUTPATIENT
Start: 2023-12-11

## 2023-12-12 NOTE — TELEPHONE ENCOUNTER
Per visit 10/3/23:  4.  Essential hypertension  Stable, has been off lisinopril  Recommend continue lisinopril, stop atenolol  Continue hydrochlorothiazide  Follow up blood pressure readings in next 2-3 weeks

## 2023-12-26 DIAGNOSIS — L30.4 INTERTRIGO: ICD-10-CM

## 2023-12-26 DIAGNOSIS — E66.01 MORBID OBESITY WITH BMI OF 40.0-44.9, ADULT (HCC): ICD-10-CM

## 2023-12-26 DIAGNOSIS — I10 ESSENTIAL HYPERTENSION: ICD-10-CM

## 2023-12-26 RX ORDER — PHENTERMINE HYDROCHLORIDE 37.5 MG/1
37.5 TABLET ORAL
Qty: 30 TABLET | Refills: 2 | Status: SHIPPED | OUTPATIENT
Start: 2023-12-26

## 2023-12-26 RX ORDER — NYSTATIN 100000 [USP'U]/G
1 POWDER TOPICAL 4 TIMES DAILY
Qty: 30 G | Refills: 1 | Status: SHIPPED | OUTPATIENT
Start: 2023-12-26

## 2023-12-27 RX ORDER — HYDROCHLOROTHIAZIDE 12.5 MG/1
12.5 CAPSULE, GELATIN COATED ORAL DAILY
Qty: 90 CAPSULE | Refills: 3 | OUTPATIENT
Start: 2023-12-27

## 2024-01-01 DIAGNOSIS — I10 ESSENTIAL HYPERTENSION: ICD-10-CM

## 2024-01-02 RX ORDER — HYDROCHLOROTHIAZIDE 12.5 MG/1
12.5 CAPSULE, GELATIN COATED ORAL DAILY
Qty: 90 CAPSULE | Refills: 3 | OUTPATIENT
Start: 2024-01-02

## 2024-01-03 DIAGNOSIS — S39.012A STRAIN OF LUMBAR REGION, INITIAL ENCOUNTER: ICD-10-CM

## 2024-01-04 DIAGNOSIS — S39.012A STRAIN OF LUMBAR REGION, INITIAL ENCOUNTER: ICD-10-CM

## 2024-01-04 DIAGNOSIS — M25.551 RIGHT HIP PAIN: ICD-10-CM

## 2024-01-04 NOTE — TELEPHONE ENCOUNTER
Refill passed per [facility] protocol.  Written on 12/07/2023 for 30 day supply with 0 refills. Is refill appropriate?  Requested Prescriptions   Pending Prescriptions Disp Refills    NAPROXEN 500 MG Oral Tab [Pharmacy Med Name: NAPROXEN 500 MG TABLET] 60 tablet 0     Sig: TAKE 1 TABLET BY MOUTH TWICE A DAY WITH MEALS       Non-Narcotic Pain Medication Protocol Passed - 1/4/2024 12:00 AM        Passed - In person appointment or virtual visit in the past 6 mos or appointment in next 3 mos     Recent Outpatient Visits              4 weeks ago [facility]rain [facility] lumbar region, [facility]    [facility]    3 months ago Well adult exam    [facility] [place]Ne Asma M, MD    Office Visit    3 months ago Morbid obesity with BMI of 40.0-44.9, adult ([facility])    [facility] [place] Marika Chambers RD    Office Visit    3 months ago Essential hypertension    [facility] [place] Paco Eason MD    Office Visit    6 months ago Cyst of skin of eyebrow    [facility] [place] Arron Parker MD    Office Visit          Future Appointments         Provider Department Appt Notes    [facility]                  Future Appointments         Provider Department Appt Notes    [facility]          Recent Outpatient Visits              4 weeks ago Strain of lumbar region, initial encounter    [facility] [place]Ne Asma M, MD    Office Visit    3 months ago Well adult exam    [facility] [place]Ne Asma M, MD    Office Visit    3 months ago Morbid obesity with BMI of  40.0-44.9, adult ([facility])    [facility], Marika Ferro RD    Office Visit    3 months ago Essential hypertension    [facility], Paco Zapata MD    Office Visit    6 months ago Cyst of skin of eyebrow    [facility], Arron Tsai MD    Office Visit

## 2024-01-04 NOTE — TELEPHONE ENCOUNTER
Please review; protocol failed/ has no protocol    Requested Prescriptions   Pending Prescriptions Disp Refills    CYCLOBENZAPRINE 10 MG Oral Tab [Pharmacy Med Name: CYCLOBENZAPRINE 10 MG TABLET] 30 tablet 0     Sig: TAKE 1 TABLET BY MOUTH EVERY DAY NIGHTLY AS NEEDED FOR MUSCLE SPASMS       There is no refill protocol information for this order        Recent Outpatient Visits              4 weeks ago Strain of lumbar region, initial encounter    [facility] [place] [place] Jcarlos Cunningham MD    Office Visit    3 months ago [facility]    [facility]    3 months ago Morbid obesity with BMI of 40.0-44.9, adult ([facility]    Office Visit    3 months ago Essential hypertension    [facility] Paco Eason MD    Office Visit    6 months ago Cyst of skin of eyebrow    [facility]    Office Visit          Future Appointments         Provider Department Appt Notes    In 3 weeks Paco Eason MD [facility] [employer] [facility]  NON SX F/U

## 2024-01-05 RX ORDER — CYCLOBENZAPRINE HCL 10 MG
10 TABLET ORAL NIGHTLY PRN
Qty: 30 TABLET | Refills: 0 | Status: SHIPPED | OUTPATIENT
Start: 2024-01-05

## 2024-01-05 RX ORDER — NAPROXEN 500 MG/1
500 TABLET ORAL 2 TIMES DAILY WITH MEALS
Qty: 60 TABLET | Refills: 0 | Status: SHIPPED | OUTPATIENT
Start: 2024-01-05

## 2024-01-07 DIAGNOSIS — I10 ESSENTIAL HYPERTENSION: ICD-10-CM

## 2024-01-08 RX ORDER — HYDROCHLOROTHIAZIDE 12.5 MG/1
12.5 CAPSULE, GELATIN COATED ORAL DAILY
Qty: 90 CAPSULE | Refills: 3 | Status: SHIPPED | OUTPATIENT
Start: 2024-01-08

## 2024-01-11 ENCOUNTER — TELEPHONE (OUTPATIENT)
Dept: SURGERY | Facility: CLINIC | Age: 49
End: 2024-01-11

## 2024-01-23 ENCOUNTER — OFFICE VISIT (OUTPATIENT)
Dept: SURGERY | Facility: CLINIC | Age: 49
End: 2024-01-23
Payer: COMMERCIAL

## 2024-01-23 VITALS
WEIGHT: 245.81 LBS | OXYGEN SATURATION: 96 % | DIASTOLIC BLOOD PRESSURE: 78 MMHG | HEIGHT: 63 IN | SYSTOLIC BLOOD PRESSURE: 124 MMHG | HEART RATE: 111 BPM | BODY MASS INDEX: 43.55 KG/M2

## 2024-01-23 DIAGNOSIS — L30.4 INTERTRIGO: ICD-10-CM

## 2024-01-23 DIAGNOSIS — K76.0 FATTY LIVER: ICD-10-CM

## 2024-01-23 DIAGNOSIS — E66.01 MORBID OBESITY WITH BMI OF 40.0-44.9, ADULT (HCC): ICD-10-CM

## 2024-01-23 DIAGNOSIS — E88.819 INSULIN RESISTANCE: ICD-10-CM

## 2024-01-23 DIAGNOSIS — I10 ESSENTIAL HYPERTENSION: Primary | ICD-10-CM

## 2024-01-23 PROCEDURE — 3074F SYST BP LT 130 MM HG: CPT | Performed by: INTERNAL MEDICINE

## 2024-01-23 PROCEDURE — 3008F BODY MASS INDEX DOCD: CPT | Performed by: INTERNAL MEDICINE

## 2024-01-23 PROCEDURE — 3078F DIAST BP <80 MM HG: CPT | Performed by: INTERNAL MEDICINE

## 2024-01-23 PROCEDURE — 99214 OFFICE O/P EST MOD 30 MIN: CPT | Performed by: INTERNAL MEDICINE

## 2024-01-23 NOTE — PROGRESS NOTES
[facility], [facility], [place]  [address]  [facility] [address]  Dept: [phone]       Patient:  Jaye Abebe  :      1975  MRN:      [id number]    Chief Complaint:    Chief Complaint   Patient presents with    Follow - Up    Weight Management     Weight check follow up        SUBJECTIVE     History of Present Illness:  Jaye is being seen today for a follow-up for non surgical weight loss.     Past Medical History:   Past Medical History:   Diagnosis Date    Asthma     Disorder of liver     Fatty Liver    Essential hypertension     Obesity         Comorbidities:  Back pain-Improvement?  no, Joint pain-Improvement?  yes, GERD-Improvement?  no, Hypertension-Improvement?  yes, Hyperlipidemia-Improvement?  yes, HELGA-Improvement?  yes, Snoring-Improvement?  yes and Sleep apnea-Improvement?  yes    OBJECTIVE     Vitals: /78   Pulse 111   Ht 5' 3\" (1.6 m)   Wt 245 lb 12.8 oz (111.5 kg)   SpO2 96%   BMI 43.54 kg/m²     Initial weight loss: +10   Total weight loss: -40   Start weight: 286    Wt Readings from Last 3 Encounters:   24 245 lb 12.8 oz (111.5 kg)   23 236 lb (107 kg)   10/03/23 236 lb (107 kg)       Patient Medications:    Current Outpatient Medications   Medication Sig Dispense Refill    Tirzepatide-Weight Management (ZEPBOUND) 2.5 MG/0.5ML Subcutaneous Solution Auto-injector Inject 2.5 mg into the skin once a week. (Patient not taking: Reported on 2023) 3 mL 0    hydroCHLOROthiazide 12.5 MG Oral Cap Take 1 capsule (12.5 mg total) by mouth daily. 90 capsule 3    cyclobenzaprine 10 MG Oral Tab Take 1 tablet (10 mg total) by mouth nightly as needed. 30 tablet 0    naproxen 500 MG Oral Tab Take 1 tablet (500 mg total) by mouth 2 (two) times daily with meals. 60 tablet 0    Phentermine HCl 37.5 MG Oral Tab Take 1 tablet (37.5 mg total) by mouth before breakfast. 30 tablet 2    Nystatin 506922 UNIT/GM External  Powder Apply 1 Application  topically 4 (four) times daily. Apply to affected areas 30 g 1    sertraline 100 MG Oral Tab Take 2 tablets (200 mg total) by mouth daily. 180 tablet 0    topiramate 25 MG Oral Tab Take 1 tablet (25 mg total) by mouth 2 (two) times daily. 180 tablet 1    SYMBICORT 160-4.5 MCG/ACT Inhalation Aerosol       albuterol 108 (90 Base) MCG/ACT Inhalation Aero Soln Inhale 2 puffs into the lungs every 6 (six) hours as needed for Wheezing or Shortness of Breath. 3 each 3    fluticasone-salmeterol (ADVAIR DISKUS) 250-50 MCG/ACT Inhalation Aerosol Powder, Breath Activated Inhale 1 puff into the lungs every 12 (twelve) hours. 3 each 3    lisinopril 10 MG Oral Tab Take 1 tablet (10 mg total) by mouth daily. 90 tablet 3    levonorgestrel 20 MCG/24HR Intrauterine IUD Use as directed  Mirena Lot [id number]   11-10       Allergies:  Patient has no known allergies.     Social History:    Social History     Socioeconomic History    Marital status:      Spouse name: Not on file    Number of children: Not on file    Years of education: Not on file    Highest education level: Not on file   Occupational History    Not on file   Tobacco Use    Smoking status: Every Day     Packs/day: 0.50     Years: 22.00     Additional pack years: 0.00     Total pack years: 11.00     Types: Cigarettes    Smokeless tobacco: Never   Vaping Use    Vaping Use: Never used   Substance and Sexual Activity    Alcohol use: Yes     Comment: occasional    Drug use: Yes     Types: Cannabis     Comment: has a medical card     Sexual activity: Yes     Birth control/protection: Mirena     Comment: 03/2018   Other Topics Concern     Service Not Asked    Blood Transfusions Not Asked    Caffeine Concern Yes     Comment: 1 cup daily soda/coffee    Occupational Exposure Not Asked    Hobby Hazards Not Asked    Sleep Concern Not Asked    Stress Concern Not Asked    Weight Concern Not Asked    Special Diet Not Asked    Back Care Not Asked     Exercise Not Asked    Bike Helmet Not Asked    Seat Belt Not Asked    Self-Exams Not Asked    Grew up on a farm Not Asked    History of tanning Not Asked    Outdoor occupation Not Asked    Pt has a pacemaker No    Pt has a defibrillator No    Breast feeding No    Reaction to local anesthetic No   Social History Narrative    Not on file     Social Determinants of Health     Financial Resource Strain: Not on file   Food Insecurity: Not on file   Transportation Needs: Not on file   Physical Activity: Not on file   Stress: Not on file   Social Connections: Not on file   Housing Stability: Not on file     Surgical History:    Past Surgical History:   Procedure Laterality Date    CHOLECYSTECTOMY  2002    COLONOSCOPY N/A 1/30/2018    Procedure: COLONOSCOPY;  Surgeon: Timbo Sue MD;  Location: [facility] ENDOSCOPY    COLONOSCOPY N/A 3/30/2021    Procedure: COLONOSCOPY;  Surgeon: Timbo Sue MD;  Location: [facility] ENDOSCOPY    CYST ASPIRATION LEFT      KNEE ARTHROSCOPY  1994     Family History:    Family History   Problem Relation Age of Onset    Cancer Sister 18        Hodgkin's Lymphoma    Other (Multiple Sclerosis) Father 56    Other (Alive and well) Mother     Breast Cancer Maternal Grandmother 50       Food Journal  Reviewed and Discussed:       Patient has a Food Journal?: yes   Patient is reading nutrition labels?  yes  Average Caloric Intake:     Average CHO Intake: 110  Is patient exercising? no  Type of exercise?     Eating Habits  Patient states the following:  Eats 2 meal(s) per day  Length of time it takes to consume a meal:  20  # of snacks per day: 1 Type of snacks:  junk  Amount of soda consumption per day:    Amount of water (in ounces) per day:  64  Drinking between meals only:  yes  Toughest challenge:  exercise    Nutritional Goals  Limit carbohydrates to 100 gms per day, Eat 100-200 calories within 1 hour of waking  and Eat 3-4 cups of fresh fruits or vegetables daily    Behavior  Modifications Reviewed and Discussed  Eat breakfast, Eat 3 meals per day, Plan meals in advance, Read nutrition labels, Drink 64 oz of water per day, Maintain a daily food journal, No drinking 30 minutes before or after meals, Utlize portion control strategies to reduce calorie intake, Identify triggers for eating and manage cues and Eat slowly and take 20 to 30 minutes to complete each meal    Exercise Goals Reviewed and Discussed    Needs to keep moving    ROS:    Constitutional: negative  Respiratory: negative  Cardiovascular: negative  Gastrointestinal: negative  Musculoskeletal:positive for arthralgias and back pain  Neurological: negative  Behavioral/Psych: positive for stress  Endocrine: negative  All other systems were reviewed and are negative    Physical Exam:   Head: Normocephalic, without obvious abnormality, atraumatic  Eyes: conjunctivae/corneas clear. PERRL, EOM's intact. Fundi benign.  Lungs: clear to auscultation bilaterally  Heart: S1, S2 normal, no murmur, click, rub or gallop, regular rate and rhythm  Abdomen:  soft, obese, non tender  Extremities: extremities normal, atraumatic, no cyanosis or edema  Pulses: 2+ and symmetric  Skin:  irritation under skin folds  Neurologic: Grossly normal    ASSESSMENT     HYPERTENSION:  The patient's blood pressure has been well controlled.  she has been checking it as instructed and has remained in relatively good control.    HYPERCHOLESTEROLEMIA:  The patient states that her cholesterol has been well controlled on her current medication.    Lab Results   Component Value Date/Time    CHOLEST 236 (H) 03/29/2023 07:05 AM     (H) 03/29/2023 07:05 AM    HDL 46 03/29/2023 07:05 AM    TRIG 309 (H) 03/29/2023 07:05 AM    VLDL 58 (H) 03/29/2023 07:05 AM       Encounter Diagnosis(ses):   Encounter Diagnoses   Name Primary?    Essential hypertension Yes    Intertrigo     Insulin resistance     Fatty liver     Morbid obesity with BMI of 40.0-44.9, adult (HCC)         PLAN     Patient is not interested in bariatric surgery. Patient desires to pursue traditional weight loss at this time.      HYPERTENSION: Blood pressure stable on the above medications. No interval change in antihypertensive medication.     DYSLIPIDEMIA: Stable on the above prescribed meal plan and medication. Liver function stable.    Lab Results   Component Value Date/Time    CHOLEST 236 (H) 03/29/2023 07:05 AM     (H) 03/29/2023 07:05 AM    HDL 46 03/29/2023 07:05 AM    TRIG 309 (H) 03/29/2023 07:05 AM    VLDL 58 (H) 03/29/2023 07:05 AM     Goals for next month:  1. Keep a food log.  2. Drink 48-64 ounces of non-caloric beverages per day. No fruit juices or regular soda.  3. Increase activity-upper body exercises, walk 10 minutes per day.  4. Increase fruit and vegetable servings to 5-6 per day.      Should come to seminar      Not feeling that Diethylpropion  Phentermine tolerating well    ekg done     Topiramate to BID    Did Saxenda in the past.   Will start Wegovy    Stress/night eating syndrome: 200 mg of Zoloft  Met with psych    Intertrigo: keep areas under skin folds dry        Diagnoses and all orders for this visit:    Essential hypertension    Intertrigo    Insulin resistance    Fatty liver    Morbid obesity with BMI of 40.0-44.9, adult (HCC)          Paco Eason MD

## 2024-01-24 ENCOUNTER — TELEPHONE (OUTPATIENT)
Dept: SURGERY | Facility: CLINIC | Age: 49
End: 2024-01-24

## 2024-02-06 DIAGNOSIS — M25.551 RIGHT HIP PAIN: ICD-10-CM

## 2024-02-06 DIAGNOSIS — S39.012A STRAIN OF LUMBAR REGION, INITIAL ENCOUNTER: ICD-10-CM

## 2024-02-07 RX ORDER — NAPROXEN 500 MG/1
500 TABLET ORAL 2 TIMES DAILY WITH MEALS
Qty: 180 TABLET | Refills: 0 | OUTPATIENT
Start: 2024-02-07

## 2024-02-07 NOTE — TELEPHONE ENCOUNTER
Refill passed per [facility] protocol.  Please review pended refill request as unable to refill due to high/very high interaction warning copied here:  Current Warnings (1 unfiltered, 3 filtered)[]Show filtered (3)  High  Drug-Drug: sertraline and naproxenToxic effects may be increased with concurrent administration of NSAIDs and Selective Serotonin Reuptake Inhibitors. The risk of upper gastrointestinal bleeding may be increased. Patients taking both drugs concurrently should be educated about the signs and symptoms of GI bleeding.  Requested Prescriptions   Pending Prescriptions Disp Refills    NAPROXEN 500 MG Oral Tab [Pharmacy Med Name: NAPROXEN 500 MG TABLET] 60 tablet 0     Sig: TAKE 1 TABLET BY MOUTH TWICE A DAY WITH MEALS       Non-Narcotic Pain Medication Protocol Passed - 2/6/2024 12:04 AM        Passed - In person appointment or virtual visit in the past 6 mos or appointment in next 3 mos     Recent Outpatient Visits              2 weeks ago Essential hypertension    [facility] Paco Eason MD    Office Visit    2 months ago Strain of lumbar region, initial encounter    [facility] Jcarlos [facility]    Office Visit    4 months ago Well adult exam    [facility] Jcarlos Moran MD    Office Visit    4 months ago Morbid obesity with BMI of 40.0-44.9, adult ([facility])    [facility] Marika Chambers RD    Office Visit    5 months ago Essential hypertension    [facility] Paco Eason MD    Office Visit          Future Appointments         Provider Department Appt Notes    In 4 months Paco Eason MD [facility]                   Recent Outpatient Visits              2 weeks ago Essential hypertension    [facility]  Paco Eason MD    Office Visit    2 months ago Strain of lumbar region, initial encounter    [facility]    Office Visit    4 months ago [place] [facility]    [facility] Visit    4 months ago Morbid obesity with BMI of 40.0-44.9, adult ([facility])    [facility] Marika Chambers RD    Office Visit    5 months ago Essential hypertension    [facility] aPco Eason MD    Office Visit          Future Appointments         Provider Department Appt Notes    In 4 months Paco Eason MD [facility]

## 2024-02-08 DIAGNOSIS — E66.01 MORBID OBESITY WITH BMI OF 40.0-44.9, ADULT (HCC): ICD-10-CM

## 2024-02-08 RX ORDER — PHENTERMINE HYDROCHLORIDE 37.5 MG/1
37.5 TABLET ORAL
Qty: 30 TABLET | Refills: 2 | Status: SHIPPED | OUTPATIENT
Start: 2024-02-08

## 2024-02-15 ENCOUNTER — PATIENT MESSAGE (OUTPATIENT)
Dept: SURGERY | Facility: CLINIC | Age: 49
End: 2024-02-15

## 2024-02-17 DIAGNOSIS — L30.4 INTERTRIGO: ICD-10-CM

## 2024-02-19 RX ORDER — NYSTATIN 100000 [USP'U]/G
1 POWDER TOPICAL 4 TIMES DAILY
Qty: 60 G | Refills: 1 | Status: SHIPPED | OUTPATIENT
Start: 2024-02-19

## 2024-03-05 NOTE — PROGRESS NOTES
The pathology report from last visit showed  left medial thigh, shave biopsy:  -Hemangioma with hemorrhage. Please log in test results, send biopsy results letter. Pt to rtc 1 year or prn. No

## 2024-05-10 ENCOUNTER — PATIENT MESSAGE (OUTPATIENT)
Dept: INTERNAL MEDICINE CLINIC | Facility: CLINIC | Age: 49
End: 2024-05-10

## 2024-05-10 NOTE — TELEPHONE ENCOUNTER
From: Jaye Abebe  To: Viviana Hodgson  Sent: 5/10/2024 8:22 AM CDT  Subject: Stress     Good Morning     I am under going extreme amount of stress. I believe I might be going through a nervous breakdown. Need a referral to speak to someone immediately.   Is this the correct process to handle this.     Jaye

## 2024-05-10 NOTE — TELEPHONE ENCOUNTER
This is not a patient of our office. Patient has been receiving primary care from Ann Moran but she is changing her PCP to Dr. Hodgson. Patient denied suicidal ideation. Patient stated she had taken time off work and feels much better today.     [facility] phone number given to the patient as a resource. Patient informed if having suicidal ideation go to the emergency department immediately. Patient voiced understanding.     RN connected the patient to Dr. Hodgson'  to schedule new patient appointment.

## 2024-05-16 ENCOUNTER — OFFICE VISIT (OUTPATIENT)
Dept: INTERNAL MEDICINE CLINIC | Facility: CLINIC | Age: 49
End: 2024-05-16

## 2024-05-16 VITALS
DIASTOLIC BLOOD PRESSURE: 84 MMHG | SYSTOLIC BLOOD PRESSURE: 122 MMHG | HEIGHT: 63 IN | BODY MASS INDEX: 43.05 KG/M2 | HEART RATE: 115 BPM | WEIGHT: 243 LBS | OXYGEN SATURATION: 98 %

## 2024-05-16 DIAGNOSIS — F41.1 GENERALIZED ANXIETY DISORDER: ICD-10-CM

## 2024-05-16 DIAGNOSIS — Z00.00 HEALTH MAINTENANCE EXAMINATION: ICD-10-CM

## 2024-05-16 DIAGNOSIS — E55.9 VITAMIN D DEFICIENCY: ICD-10-CM

## 2024-05-16 DIAGNOSIS — R63.2 BINGE EATING: ICD-10-CM

## 2024-05-16 DIAGNOSIS — K76.89 FOCAL NODULAR HYPERPLASIA OF LIVER: ICD-10-CM

## 2024-05-16 DIAGNOSIS — R20.2 TINGLING: ICD-10-CM

## 2024-05-16 DIAGNOSIS — I10 ESSENTIAL HYPERTENSION: ICD-10-CM

## 2024-05-16 DIAGNOSIS — F43.10 PTSD (POST-TRAUMATIC STRESS DISORDER): ICD-10-CM

## 2024-05-16 DIAGNOSIS — K76.0 FATTY LIVER: ICD-10-CM

## 2024-05-16 DIAGNOSIS — F32.1 CURRENT MODERATE EPISODE OF MAJOR DEPRESSIVE DISORDER WITHOUT PRIOR EPISODE (HCC): ICD-10-CM

## 2024-05-16 DIAGNOSIS — R74.8 ELEVATED ALKALINE PHOSPHATASE LEVEL: ICD-10-CM

## 2024-05-16 DIAGNOSIS — Z72.0 TOBACCO ABUSE: ICD-10-CM

## 2024-05-16 DIAGNOSIS — E66.01 MORBID OBESITY WITH BMI OF 40.0-44.9, ADULT (HCC): ICD-10-CM

## 2024-05-16 DIAGNOSIS — E53.8 VITAMIN B12 DEFICIENCY: ICD-10-CM

## 2024-05-16 DIAGNOSIS — J45.50 SEVERE PERSISTENT ASTHMA WITHOUT COMPLICATION (HCC): Primary | ICD-10-CM

## 2024-05-16 DIAGNOSIS — G56.03 BILATERAL CARPAL TUNNEL SYNDROME: ICD-10-CM

## 2024-05-16 DIAGNOSIS — L30.4 INTERTRIGO: ICD-10-CM

## 2024-05-16 DIAGNOSIS — K58.0 IRRITABLE BOWEL SYNDROME WITH DIARRHEA: ICD-10-CM

## 2024-05-16 DIAGNOSIS — E78.5 HYPERLIPIDEMIA, UNSPECIFIED HYPERLIPIDEMIA TYPE: ICD-10-CM

## 2024-05-16 PROBLEM — E78.1 HYPERTRIGLYCERIDEMIA: Status: RESOLVED | Noted: 2022-10-15 | Resolved: 2024-05-16

## 2024-05-16 PROBLEM — R63.5 WEIGHT GAIN: Status: RESOLVED | Noted: 2021-09-23 | Resolved: 2024-05-16

## 2024-05-16 PROBLEM — I73.9: Status: RESOLVED | Noted: 2018-05-29 | Resolved: 2024-05-16

## 2024-05-16 PROBLEM — K52.9 COLITIS: Status: RESOLVED | Noted: 2018-01-29 | Resolved: 2024-05-16

## 2024-05-16 PROBLEM — E88.819 INSULIN RESISTANCE: Status: RESOLVED | Noted: 2023-09-07 | Resolved: 2024-05-16

## 2024-05-16 PROBLEM — L83 ACANTHOSIS NIGRICANS, ACQUIRED: Status: RESOLVED | Noted: 2017-06-06 | Resolved: 2024-05-16

## 2024-05-16 PROBLEM — R53.83 FATIGUE: Status: RESOLVED | Noted: 2023-04-20 | Resolved: 2024-05-16

## 2024-05-16 PROBLEM — R60.0 BILATERAL LEG EDEMA: Status: RESOLVED | Noted: 2018-05-29 | Resolved: 2024-05-16

## 2024-05-16 PROBLEM — F43.9 STRESS: Status: RESOLVED | Noted: 2022-05-16 | Resolved: 2024-05-16

## 2024-05-16 PROBLEM — R74.01 ELEVATED ALT MEASUREMENT: Status: RESOLVED | Noted: 2022-09-29 | Resolved: 2024-05-16

## 2024-05-16 PROBLEM — E78.00 HYPERCHOLESTEROLEMIA: Status: RESOLVED | Noted: 2023-04-20 | Resolved: 2024-05-16

## 2024-05-16 PROBLEM — E65 LIPOHYPERTROPHY: Status: RESOLVED | Noted: 2018-05-29 | Resolved: 2024-05-16

## 2024-05-16 PROBLEM — D72.829 LEUKOCYTOSIS: Status: RESOLVED | Noted: 2022-10-15 | Resolved: 2024-05-16

## 2024-05-16 PROBLEM — R40.0 SLEEPINESS: Status: RESOLVED | Noted: 2023-04-20 | Resolved: 2024-05-16

## 2024-05-16 PROBLEM — J45.20 MILD INTERMITTENT ASTHMA WITHOUT COMPLICATION (HCC): Status: RESOLVED | Noted: 2020-06-24 | Resolved: 2024-05-16

## 2024-05-16 LAB
ALBUMIN SERPL-MCNC: 4.9 G/DL (ref 3.2–4.8)
ALBUMIN/GLOB SERPL: 1.5 {RATIO} (ref 1–2)
ALP LIVER SERPL-CCNC: 174 U/L
ALT SERPL-CCNC: 29 U/L
ANION GAP SERPL CALC-SCNC: 7 MMOL/L (ref 0–18)
AST SERPL-CCNC: 14 U/L (ref ?–34)
BILIRUB SERPL-MCNC: 0.4 MG/DL (ref 0.3–1.2)
BUN BLD-MCNC: 15 MG/DL (ref 9–23)
BUN/CREAT SERPL: 15.2 (ref 10–20)
CALCIUM BLD-MCNC: 11.2 MG/DL (ref 8.7–10.4)
CHLORIDE SERPL-SCNC: 109 MMOL/L (ref 98–112)
CHOLEST SERPL-MCNC: 206 MG/DL (ref ?–200)
CO2 SERPL-SCNC: 20 MMOL/L (ref 21–32)
CREAT BLD-MCNC: 0.99 MG/DL
EGFRCR SERPLBLD CKD-EPI 2021: 70 ML/MIN/1.73M2 (ref 60–?)
FASTING PATIENT LIPID ANSWER: NO
FASTING STATUS PATIENT QL REPORTED: NO
GLOBULIN PLAS-MCNC: 3.3 G/DL (ref 2–3.5)
GLUCOSE BLD-MCNC: 84 MG/DL (ref 70–99)
HCV AB SERPL QL IA: NONREACTIVE
HDLC SERPL-MCNC: 47 MG/DL (ref 40–59)
IGA SERPL-MCNC: 273.9 MG/DL (ref 70–312)
LDLC SERPL CALC-MCNC: 116 MG/DL (ref ?–100)
NONHDLC SERPL-MCNC: 159 MG/DL (ref ?–130)
OSMOLALITY SERPL CALC.SUM OF ELEC: 282 MOSM/KG (ref 275–295)
POTASSIUM SERPL-SCNC: 4.4 MMOL/L (ref 3.5–5.1)
PROT SERPL-MCNC: 8.2 G/DL (ref 5.7–8.2)
SODIUM SERPL-SCNC: 136 MMOL/L (ref 136–145)
TRIGL SERPL-MCNC: 249 MG/DL (ref 30–149)
TSI SER-ACNC: 0.53 MIU/ML (ref 0.55–4.78)
VIT D+METAB SERPL-MCNC: 18.8 NG/ML (ref 30–100)
VLDLC SERPL CALC-MCNC: 44 MG/DL (ref 0–30)

## 2024-05-16 PROCEDURE — 80061 LIPID PANEL: CPT | Performed by: FAMILY MEDICINE

## 2024-05-16 PROCEDURE — 85025 COMPLETE CBC W/AUTO DIFF WBC: CPT | Performed by: FAMILY MEDICINE

## 2024-05-16 PROCEDURE — 85060 BLOOD SMEAR INTERPRETATION: CPT | Performed by: FAMILY MEDICINE

## 2024-05-16 PROCEDURE — 99406 BEHAV CHNG SMOKING 3-10 MIN: CPT | Performed by: FAMILY MEDICINE

## 2024-05-16 PROCEDURE — 84439 ASSAY OF FREE THYROXINE: CPT | Performed by: FAMILY MEDICINE

## 2024-05-16 PROCEDURE — 99214 OFFICE O/P EST MOD 30 MIN: CPT | Performed by: FAMILY MEDICINE

## 2024-05-16 PROCEDURE — 99396 PREV VISIT EST AGE 40-64: CPT | Performed by: FAMILY MEDICINE

## 2024-05-16 PROCEDURE — 84443 ASSAY THYROID STIM HORMONE: CPT | Performed by: FAMILY MEDICINE

## 2024-05-16 PROCEDURE — 86803 HEPATITIS C AB TEST: CPT | Performed by: FAMILY MEDICINE

## 2024-05-16 PROCEDURE — 84481 FREE ASSAY (FT-3): CPT | Performed by: FAMILY MEDICINE

## 2024-05-16 PROCEDURE — 3074F SYST BP LT 130 MM HG: CPT | Performed by: FAMILY MEDICINE

## 2024-05-16 PROCEDURE — 83036 HEMOGLOBIN GLYCOSYLATED A1C: CPT | Performed by: FAMILY MEDICINE

## 2024-05-16 PROCEDURE — 86364 TISS TRNSGLTMNASE EA IG CLAS: CPT | Performed by: FAMILY MEDICINE

## 2024-05-16 PROCEDURE — 82784 ASSAY IGA/IGD/IGG/IGM EACH: CPT | Performed by: FAMILY MEDICINE

## 2024-05-16 PROCEDURE — 90471 IMMUNIZATION ADMIN: CPT | Performed by: FAMILY MEDICINE

## 2024-05-16 PROCEDURE — 90677 PCV20 VACCINE IM: CPT | Performed by: FAMILY MEDICINE

## 2024-05-16 PROCEDURE — 3079F DIAST BP 80-89 MM HG: CPT | Performed by: FAMILY MEDICINE

## 2024-05-16 PROCEDURE — 80053 COMPREHEN METABOLIC PANEL: CPT | Performed by: FAMILY MEDICINE

## 2024-05-16 PROCEDURE — 82607 VITAMIN B-12: CPT | Performed by: FAMILY MEDICINE

## 2024-05-16 PROCEDURE — 82306 VITAMIN D 25 HYDROXY: CPT | Performed by: FAMILY MEDICINE

## 2024-05-16 PROCEDURE — 3008F BODY MASS INDEX DOCD: CPT | Performed by: FAMILY MEDICINE

## 2024-05-16 RX ORDER — TIOTROPIUM BROMIDE 18 UG/1
18 CAPSULE ORAL; RESPIRATORY (INHALATION) DAILY
Qty: 90 CAPSULE | Refills: 0 | Status: SHIPPED | OUTPATIENT
Start: 2024-05-16

## 2024-05-16 NOTE — ASSESSMENT & PLAN NOTE
Patient with a history of hypertension.  Blood pressures are currently well-controlled.  Continue lisinopril 10 mg daily and hydrochlorothiazide 12.5 mg daily.  Monitor CMP.
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
## 2024-05-16 NOTE — PATIENT INSTRUCTIONS
PATIENT INSTRUCTIONS    Thank you for seeing me today, it was a pleasure taking care of you.  Please check out at the  and schedule a follow up appointment.  Return in about 3 months (around 8/16/2024) for physical .  Please remember that the obed period for all appointments is 5 minutes. This is to help maximize the amount of time that we can spend together at our visits.    See psychiatry   Continue sertraline for now  Please monitor for a call from [facility]. Someone will reach out to you to speak with you.  If you do not hear a response from [facility] in 1 week, please call them at [phone].   Continue symbicort  Add on spiriva inhaler  Get pulmonary function testing done - evaluate for COPD   Use a wrist brace when sleeping at night   Continue see Dr Jenaro FOSTER diet - try a strict 2 week diet, and then can consider gradually bringing in one food item at a time   Fiber supplementation - jessi Scott,  Dr. Mesa    Quitting Smoking    Quitting smoking is the most important step you can take to improve your health. We're glad you have set a goal to improve your health.    Quit Smoking Resources    In addition to medications, use the STAR plan to help you successfully quit.   Stick with your quit date!   Tell friends, family, and coworkers your quit date. Request their understanding and support.  Anticipate and prepare for challenges. Some examples are withdrawal symptoms, being around others who smoke, and drinking alcohol.  Remove all tobacco products and paraphernalia from your environment. Make your home and vehicles smoke-free.    Free resources for additional support:  National tobacco quitline: 1-800-QUIT-NOW (1-510.661.5009).  SmokefreeTXT is a free text program to assist you in quitting. Visit https://www.smokefree.gov/smokefreetxt for more information.  Feel free to call your care manager at ([phone]) for additional support.

## 2024-05-16 NOTE — ASSESSMENT & PLAN NOTE
Patient with generalized anxiety disorder, major depressive disorder as well as PTSD.  She reports that she is having a very difficult time with her mental health at this time, significant stress.  Currently on sertraline 200 mg daily.  Has plans to see psychiatry.  Aaron Meraz navigator order for referrals to psychiatry and therapy.   no

## 2024-05-16 NOTE — ASSESSMENT & PLAN NOTE
Patient with a history of irritable bowel syndrome.  Advised FODMAP diet  Can try fiber supplementation with Citrucel.  If without improvement consider medication or referral to GI

## 2024-05-16 NOTE — ASSESSMENT & PLAN NOTE
Patient with a history of focal nodular hyperplasia of the liver  She is seeing surgical oncology and has had the liver lesion monitored.  She has been advised that no further routine monitoring is needed at this time

## 2024-05-16 NOTE — ASSESSMENT & PLAN NOTE
Patient with a history of smoking currently smoking cessation advised especially in light of history of asthma.  Prevnar 20 vaccine today

## 2024-05-16 NOTE — ASSESSMENT & PLAN NOTE
Patient with a history of tingling.  Tingling of hands seem consistent with carpal tunnel syndrome  Advise wearing wrist brace at night  Can check labs including B12, CBC, TSH.  Moderate tingling of the feet-consider EMG moving forward.  Can also consider gabapentin if tingling is bothersome.

## 2024-05-16 NOTE — ASSESSMENT & PLAN NOTE
Patient with generalized anxiety disorder, major depressive disorder as well as PTSD.  She reports that she is having a very difficult time with her mental health at this time, significant stress.  Currently on sertraline 200 mg daily.  Has plans to see psychiatry.  Aaron Meraz navigator order for referrals to psychiatry and therapy.

## 2024-05-16 NOTE — ASSESSMENT & PLAN NOTE
Patient with a history of severe asthma.  Currently on Symbicort 160-4.5 mcg.  Using albuterol on a regular basis.  Start Spiriva 18 mcg daily.  Get pulmonary function testing  Needs to stop smoking  May have COPD.  Prevnar 20 vaccine today.

## 2024-05-17 ENCOUNTER — PATIENT MESSAGE (OUTPATIENT)
Dept: INTERNAL MEDICINE CLINIC | Facility: CLINIC | Age: 49
End: 2024-05-17

## 2024-05-17 LAB
BASOPHILS # BLD AUTO: 0.07 X10(3) UL (ref 0–0.2)
BASOPHILS NFR BLD AUTO: 0.5 %
DEPRECATED RDW RBC AUTO: 41.4 FL (ref 35.1–46.3)
EOSINOPHIL # BLD AUTO: 0.25 X10(3) UL (ref 0–0.7)
EOSINOPHIL NFR BLD AUTO: 1.8 %
ERYTHROCYTE [DISTWIDTH] IN BLOOD BY AUTOMATED COUNT: 13.2 % (ref 11–15)
EST. AVERAGE GLUCOSE BLD GHB EST-MCNC: 114 MG/DL (ref 68–126)
HBA1C MFR BLD: 5.6 % (ref ?–5.7)
HCT VFR BLD AUTO: 51 %
HGB BLD-MCNC: 17.5 G/DL
IMM GRANULOCYTES # BLD AUTO: 0.09 X10(3) UL (ref 0–1)
IMM GRANULOCYTES NFR BLD: 0.7 %
LYMPHOCYTES # BLD AUTO: 4.17 X10(3) UL (ref 1–4)
LYMPHOCYTES NFR BLD AUTO: 30.6 %
MCH RBC QN AUTO: 29.3 PG (ref 26–34)
MCHC RBC AUTO-ENTMCNC: 34.3 G/DL (ref 31–37)
MCV RBC AUTO: 85.4 FL
MONOCYTES # BLD AUTO: 0.75 X10(3) UL (ref 0.1–1)
MONOCYTES NFR BLD AUTO: 5.5 %
NEUTROPHILS # BLD AUTO: 8.3 X10 (3) UL (ref 1.5–7.7)
NEUTROPHILS # BLD AUTO: 8.3 X10(3) UL (ref 1.5–7.7)
NEUTROPHILS NFR BLD AUTO: 60.9 %
PLATELET # BLD AUTO: 416 10(3)UL (ref 150–450)
RBC # BLD AUTO: 5.97 X10(6)UL
T3FREE SERPL-MCNC: 2.99 PG/ML (ref 2.4–4.2)
T4 FREE SERPL-MCNC: 1.4 NG/DL (ref 0.8–1.7)
TTG IGA SER-ACNC: 0.4 U/ML (ref ?–7)
WBC # BLD AUTO: 13.6 X10(3) UL (ref 4–11)

## 2024-05-20 ENCOUNTER — PATIENT MESSAGE (OUTPATIENT)
Dept: INTERNAL MEDICINE CLINIC | Facility: CLINIC | Age: 49
End: 2024-05-20

## 2024-05-20 ENCOUNTER — TELEPHONE (OUTPATIENT)
Dept: INTERNAL MEDICINE CLINIC | Facility: CLINIC | Age: 49
End: 2024-05-20

## 2024-05-20 ENCOUNTER — OFFICE VISIT (OUTPATIENT)
Dept: INTERNAL MEDICINE CLINIC | Facility: CLINIC | Age: 49
End: 2024-05-20

## 2024-05-20 VITALS
BODY MASS INDEX: 43.05 KG/M2 | DIASTOLIC BLOOD PRESSURE: 88 MMHG | SYSTOLIC BLOOD PRESSURE: 128 MMHG | OXYGEN SATURATION: 98 % | HEIGHT: 63 IN | HEART RATE: 100 BPM | WEIGHT: 243 LBS | RESPIRATION RATE: 17 BRPM

## 2024-05-20 DIAGNOSIS — F32.1 CURRENT MODERATE EPISODE OF MAJOR DEPRESSIVE DISORDER WITHOUT PRIOR EPISODE (HCC): ICD-10-CM

## 2024-05-20 DIAGNOSIS — M54.2 NECK PAIN: ICD-10-CM

## 2024-05-20 DIAGNOSIS — F43.10 PTSD (POST-TRAUMATIC STRESS DISORDER): ICD-10-CM

## 2024-05-20 DIAGNOSIS — F41.1 GENERALIZED ANXIETY DISORDER: ICD-10-CM

## 2024-05-20 DIAGNOSIS — I10 ESSENTIAL HYPERTENSION: ICD-10-CM

## 2024-05-20 DIAGNOSIS — E55.9 VITAMIN D DEFICIENCY: ICD-10-CM

## 2024-05-20 DIAGNOSIS — J45.50 SEVERE PERSISTENT ASTHMA WITHOUT COMPLICATION (HCC): ICD-10-CM

## 2024-05-20 DIAGNOSIS — D72.829 LEUKOCYTOSIS, UNSPECIFIED TYPE: ICD-10-CM

## 2024-05-20 DIAGNOSIS — R51.9 NONINTRACTABLE HEADACHE, UNSPECIFIED CHRONICITY PATTERN, UNSPECIFIED HEADACHE TYPE: ICD-10-CM

## 2024-05-20 DIAGNOSIS — M77.12 LATERAL EPICONDYLITIS OF BOTH ELBOWS: ICD-10-CM

## 2024-05-20 DIAGNOSIS — E78.5 HYPERLIPIDEMIA, UNSPECIFIED HYPERLIPIDEMIA TYPE: ICD-10-CM

## 2024-05-20 DIAGNOSIS — M54.50 CHRONIC BILATERAL LOW BACK PAIN WITHOUT SCIATICA: ICD-10-CM

## 2024-05-20 DIAGNOSIS — E83.52 HYPERCALCEMIA: Primary | ICD-10-CM

## 2024-05-20 DIAGNOSIS — G89.29 CHRONIC BILATERAL LOW BACK PAIN WITHOUT SCIATICA: ICD-10-CM

## 2024-05-20 DIAGNOSIS — M77.00 MEDIAL EPICONDYLITIS OF ELBOW, UNSPECIFIED LATERALITY: ICD-10-CM

## 2024-05-20 DIAGNOSIS — M77.11 LATERAL EPICONDYLITIS OF BOTH ELBOWS: ICD-10-CM

## 2024-05-20 DIAGNOSIS — R74.8 ELEVATED ALKALINE PHOSPHATASE LEVEL: ICD-10-CM

## 2024-05-20 DIAGNOSIS — D58.2 ELEVATED HEMOGLOBIN (HCC): ICD-10-CM

## 2024-05-20 PROBLEM — E05.90 SUBCLINICAL HYPERTHYROIDISM: Status: ACTIVE | Noted: 2024-05-20
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
## 2024-05-20 NOTE — ASSESSMENT & PLAN NOTE
Patient with a history of severe asthma.  Currently on Symbicort 160-4.5 mcg.  Now also on Spiriva 18 mcg daily.  Using albuterol on a regular basis.  Get pulmonary function testing  Needs to stop smoking  May have COPD.

## 2024-05-20 NOTE — ASSESSMENT & PLAN NOTE
Patient with a history of chronic hypercalcemia.  Repeat CMP, PTH.  Referral to endocrinology provided for further evaluation.

## 2024-05-20 NOTE — ASSESSMENT & PLAN NOTE
Patient with a recently elevated white blood cell as well as hemoglobin count.  Would benefit from getting stress under control  Would also benefit from smoking cessation.  Advise staying well-hydrated  Repeat CBC to monitor.  If with persistently elevated blood counts, will refer her to hematology for further evaluation.

## 2024-05-20 NOTE — ASSESSMENT & PLAN NOTE
Patient with a history of hyperlipidemia.  Lab reviewed in office today  Focus on a healthy diet and exercise

## 2024-05-20 NOTE — ASSESSMENT & PLAN NOTE
Recent episodes of on and off headaches for the last few weeks.  Suspect tension related headaches  Stay well-hydrated  Needs would benefit from getting stress under control  ER precautions discussed  If without improvement, consider MRI.

## 2024-05-20 NOTE — ASSESSMENT & PLAN NOTE
Patient with symptoms consistent with medial and lateral epicondylitis of the bilateral elbows.  Stretches and exercises provided.  Can go to physical therapy.

## 2024-05-20 NOTE — PATIENT INSTRUCTIONS
PATIENT INSTRUCTIONS    Thank you for seeing me today, it was a pleasure taking care of you.  Please check out at the  and schedule a follow up appointment.  Return in about 3 months (around 8/20/2024) for physical .  Please remember that the obed period for all appointments is 5 minutes. This is to help maximize the amount of time that we can spend together at our visits.    See psychiatry   Continue sertraline for now  Please monitor for a call from [facility]. Someone will reach out to you to speak with you.  If you do not hear a response from [facility] in 1 week, please call them at [phone].   Continue symbicort, continue spiriva inhaler  Get pulmonary function testing done - evaluate for COPD  [phone]   Use a wrist brace when sleeping at night   Continue see Dr Jenaro FOSTER diet - try a strict 2 week diet, and then can consider gradually bringing in one food item at a time   Fiber supplementation - citrucel   NEW----  See endocrinology   Repeat blood work in 3 weeks, stay very well hydrated prior to blood work  Need to focus on quitting smoking  Take vitamin D 0905-1025 U daily   Can see physical therapy for neck and back pain and arm pain     Best,  Dr. Mesa

## 2024-05-20 NOTE — TELEPHONE ENCOUNTER
From: Jaye Abebe  To: Kennedy Mesa  Sent: 5/20/2024 8:29 AM CDT  Subject: Smeared test    Good Morning,    On that one test that was evaluated.   It states that further clinical correlation is recommended? Do I need to follow up with that?    However, I did of course google all my test results. With all the symptoms that I’m having along with the blood work. Do you think I might have polycythemia vera?    I do have an appointment with you today at 3. Should I keep it or cancel it?    Thanks,  Jaye

## 2024-05-20 NOTE — ASSESSMENT & PLAN NOTE
Patient with intermittent neck and lumbar back pain.  Can take Tylenol and Advil as needed for pain.  Physical therapy referral provided.

## 2024-05-20 NOTE — ASSESSMENT & PLAN NOTE
Patient with a history of hypertension.  Blood pressures are currently well-controlled.  Continue lisinopril 10 mg daily and hydrochlorothiazide 12.5 mg daily.

## 2024-05-21 ENCOUNTER — TELEPHONE (OUTPATIENT)
Age: 49
End: 2024-05-21

## 2024-05-21 NOTE — TELEPHONE ENCOUNTER
Family Medical Leave Act forms received in forms department are missing pages. Will need to use originals or download from [employer] website. Valid [place] Authorization received with forms.

## 2024-05-22 NOTE — TELEPHONE ENCOUNTER
"SUBJECTIVE:  Shanna Boothe is a 67year old male who presents for follow up and CC of prostate cancer. Former patient of Dr. Kendra Ramirez. Patient was seen by myself earlier this year due to acute urinary retention, patient passed his trial of void without difficulty that time and his care was returned to his previous urologist.  Patient here today to establish care with myself once again as his former urologist has left our system. Patient has history of high volume and high risk prostate cancer ([place] 5+5, iPSA 11.50) diagnosed by Dr. Kendra Ramirez. The CT of the abdomen and pelvis and a bone scan were performed on September 26, 2018. The CT scan revealed mild enlargement of the prostate gland. No gross tumor identified in the prostate and no gross extracapsular extension identified. Seminal vesicles unremarkable. No convincing evidence for metastatic or locally advanced disease. The bone scan revealed no convincing evidence of osseous metastatic disease. PET scan reviewed. Pt completed XRT with Dr. Susi Nick on 2/2018. Lupron therapyÂ under the direction of medical oncology with Dr. Rima Whitlock. Patient further treated for overactive bladder and is status post 100 units of Botox on 11/20/18 and according to notes patient had no noticeable symptom improvement at there last visit. Daytime frequency of urination -  4-5, Night time frequency of urination - q 2 hours, - Gross Hematuria, - UTIs, -dysuria. Patient wishes for just conservative measures for his overactive bladder and leakage. PVR at our last visit was 7 ml. PSA 0.06 obtained on 6/20/19.      ALLERGIES:   Allergen Reactions   â¢ Sulfa Drugs Cross Reactors HIVES   â¢ Lipitor [Atorvastatin Calcium] MYALGIA   â¢ Misc Natural Products HIVES     ""orange dye\""   â¢ Sulfamethoxazole      Past Medical History:   Diagnosis Date   â¢ Anesthesia complication     pt reports: self or family member with trouble   â¢ Anxiety    â¢ Arrhythmia    â¢ Arteriosclerotic heart disease    â¢ " Cld patient to get details, gathered the below;    Type of Leave: Family Medical Leave Act Cont.   Reason for Leave: Multiple Ailment/ Stress & Anxiety/ PTSD/ Asthma   Start date of leave: 05/10/2024-05/28/2024  How much time needed?: couple weeks   Forms Due Date: ASAP   Was Fee and Turnaround info Given?: Yes   Patient is aware that we are only able to use one ailment for this Family Medical Leave Act, patient stated that Dr is aware of current Stress/ Anxiety levels and the possibility of this causing flares, patient expressed understanding of next steps.    Arthritis     all jts   â¢ Atrial fibrillation (CMS/HCC) 9/28/2016   â¢ Bone cancer (CMS/[facility])    â¢ BPH (benign prostatic hypertrophy)    â¢ Cardiomyopathy (CMS/[facility]) 01/2017    EF 27%   â¢ Charcot's joint    â¢ Chronic low back pain     with sciatica   â¢ Chronic pain     back, shoulders   â¢ Colonic polyp 10/12/2012    hyperplastic   â¢ Concussion    â¢ Congestive cardiac failure (CMS/[facility])    â¢ Depression    â¢ Diabetes mellitus (CMS/[facility])    â¢ Diabetic complication (CMS/[facility]) 2/9981   â¢ Diverticulosis    â¢ Dyspnea and respiratory abnormalities 1/6/2015   â¢ Erectile dysfunction    â¢ Fracture    â¢ Gastroesophageal reflux disease    â¢ HTN (hypertension)     stubborn   â¢ Hypercholesterolemia    â¢ Malignant neoplasm (CMS/[facility])     lymphoplasmacytic lymphoma   â¢ Malignant neoplasm of prostate (CMS/[facility])    â¢ Myocardial infarction (CMS/[facility]) 2015    x 2   â¢ Myocardial infarction (CMS/[facility]) 2019    February 15   â¢ Neurogenic bladder    â¢ Neuropathy     in the feet   â¢ Otitis media    â¢ Pancreatic cyst    â¢ Pancreatitis    â¢ Pneumonia    â¢ Polyneuropathy    â¢ PONV (postoperative nausea and vomiting)    â¢ Spinal stenosis    â¢ Urinary incontinence    â¢ Use of cane as ambulatory aid    â¢ Vertigo    â¢ Wears glasses      Past Surgical History:   Procedure Laterality Date   â¢ Ankle surgery      bilateral   â¢ Appendectomy  01/01/1995   â¢ Bi-v icd implant  02/15/2017    [facility]   â¢ Cholecystectomy     â¢ Colonoscopy  7/1/2004    Colonoscopy   â¢ Colonoscopy  10/14/2015    Colonoscopy-divertic, int hemorrhoids   â¢ Colonoscopy w/ polypectomy  10/12/2012    polyp (hyperplastic)   â¢ Colonoscopy w/ polypectomy  08/31/2009   â¢ Coronary angioplasty with stent placement  11/12/2004    stent LADTaxus Drug Eluting stent 3.5X 16    â¢ Echo heart limited  06/16/2017   â¢ Ercp  09/17/2013    ERCP-dilated pancreatic duct   â¢ Esophagogastroduodenoscopy  12/30/2009    EGD   â¢ Eye surgery     â¢ Gi endoscopic ultrasound  01/09/2017    cysts tail of pancreas and head of pancreas   â¢ Gi endoscopic ultrasound  09/29/2014    EUS-pancreatic cyst-tail of pancreas 1.25 cm x 0.9 cm, Head of pancreas 1 cm   â¢ Gi endoscopic ultrasound  10/19/2015    pancreatic cyst tail of pancreas 1.7cm x 0.5cm, 2 cysts head of pancreas less than 5 mm, normal pancreatic duct   â¢ Gi endoscopic ultrasound  01/09/2017    2 cysts head of pancreas 19mm x 10.5 mm, 9mm x 3mm   â¢ Joint replacement  12/1/2010    Right Shoulder   â¢ Joint replacement  11/24/2014    Right shoulder revision   â¢ Joint replacement Right 1/22/2016    hemiarthroplasty shoulder   â¢ Laminectomy  12/1/1999    Laminectomy with fusion   â¢ Muscle biopsy Left 04/26/2016    Left quadriceps muscle biopsy-negative   â¢ Muscle biopsy  04/26/2016    quadriceps muscle-no specific pathologic changes   â¢ Prostate biopsy  08/24/2018   â¢ Rotator cuff repair  04/23/2008   â¢ Skin biopsy     â¢ Toe amputation  01/01/1997    left small toe   â¢ Trigger finger release  2012    bilateral   â¢ Upper endoscopic ultrasound w/ fna  09/27/2013    EUS-pancreatic cyst - tail of pancreas 2.3 x 1.5 cm   â¢ Vitrectomy  02/02/2010     Current Medications    ASCORBIC ACID (VITAMIN C) 1000 MG TABLET    Take 1 tablet by mouth daily. ASPIRIN 325 MG TABLET    Take 1 tablet by mouth daily. BLOOD GLUCOSE (PENG CONTOUR NEXT TEST) TEST STRIP    Test blood sugar 4 times daily as directed. Diagnosis: E11.9. Meter: Contour Next    CALCIUM CARBONATE-VITAMIN D (CALCIUM 600 + D) 600-400 MG-UNIT PER TABLET    Take 1 tablet by mouth daily. CARVEDILOL (COREG) 25 MG TABLET    Take 0.5 tablets by mouth 2 times daily (with meals). CEPHALEXIN 500 MG TABLET    Take 1 tablet by mouth daily. CHOLECALCIFEROL (VITAMIN D3) 2000 UNITS TABS    Take 1 tablet by mouth daily. CLOPIDOGREL (PLAVIX) 75 MG TABLET    Take 1 tablet by mouth daily. DISPENSE (CHECK, UNKNOWN CONCENTRATION)    1 tablet daily.  Vitamin B6 E, Magnesium    FUROSEMIDE (LASIX) 40 MG TABLET    Take 0.5 tablets by mouth 2 times daily. INSULIN ASPART (NOVOLOG) 100 UNIT/ML INJECTABLE SOLUTION    Use up to 100 units daily via insulin pump    ISOSORBIDE MONONITRATE (IMDUR) 30 MG 24 HR TABLET    TAKE ONE-HALF TABLET BY MOUTH EVERY DAY    LOPERAMIDE HCL (IMODIUM A-D PO)    Take 2 mg by mouth daily as needed. MAGNESIUM 200 MG TAB    Take 400 mg by mouth daily. Take 400 mg (2 tablets) by mouth daily    MIRABEGRON ER (MYRBETRIQ) 50 MG 24 HR TABLET    Take 1 tablet by mouth daily. MULTIPLE VITAMINS-MINERALS (CENTRUM) TABLET    Take 1 tablet by mouth daily. NITROGLYCERIN (NITROSTAT) 0.4 MG SUBLINGUAL TABLET    PLACE 1 TABLET UNDER THE TONGUE EVERY 5 MINUTES AS NEEDED FOR CHEST PAIN    OMEGA-3 FATTY ACIDS (FISH OIL) 1000 MG CAPSULE    Take 1 g by mouth 2 times daily. POTASSIUM CHLORIDE (KLOR-CON M) 20 MEQ KATIA ER TABLET    Take 1 tablet by mouth daily. ROSUVASTATIN (CRESTOR) 10 MG TABLET    Take 1 tablet by mouth daily. SACUBITRIL-VALSARTAN (ENTRESTO)  MG PER TABLET    Take 1 tablet by mouth 2 times daily. SOTALOL (BETAPACE) 80 MG TABLET    Take 1 tablet by mouth 2 times daily. SPIRONOLACTONE (ALDACTONE) 100 MG TABLET    TAKE ONE-HALF TABLET BY MOUTH DAILY    VENLAFAXINE XR (EFFEXOR XR) 37.5 MG 24 HR CAPSULE    Take 1 capsule by mouth daily.      Social History     Tobacco Use   â¢ Smoking status: Former Smoker     Packs/day: 1.50     Types: Cigarettes     Last attempt to quit: 1970     Years since quittin.6   â¢ Smokeless tobacco: Former User   Substance Use Topics   â¢ Alcohol use: No     Alcohol/week: 0.0 oz     Comment: soc   â¢ Drug use: No     Family History   Problem Relation Age of Onset   â¢ Diabetes Mother    â¢ Hypertension Mother    â¢ High cholesterol Mother    â¢ Vision Loss Mother    â¢ Heart disease Father    â¢ Kidney disease Father    â¢ Early death Father    â¢ Hypertension Father    â¢ High cholesterol Father    â¢ Stroke/TIA Brother        ROS:  Constitutional: Denies fevers or weight "changes  Cardiovascular: Denies chest pain or SOB  GI: Denies nausea, vomiting, diarrhea or constipation  All remaining ROS were negative in a 10-point survey except for those listed in the HPI. OBJECTIVE:   Physical Exam:   Visit Vitals  BP (!) 204/80   Pulse 70   Temp 97.2 Â°F (36.2 Â°C)   Ht 5' 5"" (1.651 m)   Wt 103 kg   BMI 37.79 kg/mÂ²     General: Well developed, well nourished male  Eyes: Normal conjunctivea and lids, no erythema or edema noted  Ears: Normal Pinna and lobe of the ear. External ear canal unremarkable  Skin: Normal Color/tone. Without obvious lesions. Warm and dry  Lungs: No laboring or audible wheezes  Abdomen: Soft, non-tender, non-distended. No Heptosplenomegaly. No CVAT  Genitourinary:  Penis is uncircumcised. Urethral meatus patent w/o lesions or plaques. Testes descended bilaterally without masses or tenderness. Vas, epididymis normal bilaterally. Scrotum, Symmetrical and without lesions. No mass, palpable vas, no hydrocele, no varicocele  Digital Rectal Exam: No perianal lesions. Normal rectal tone. No rectal masses. Prostate is +2/4. Not Fixed, Not Tender, Benign appearing gland, Seminal vesicles are non-palpable.        Laboratory:  Lab Results   Component Value Date    PSA 0.06 06/20/2019    PSA 0.20 04/01/2019    PSA 0.17 02/19/2019    PSA 0.22 02/15/2019    PSA 0.47 12/31/2018    PSA 1.21 12/03/2018    PSA 3.06 11/07/2018    PSA 9.79 (H) 10/11/2018    PSA 11.50 (H) 08/13/2018    PSA 8.47 (H) 06/11/2018    PSA 5.89 (H) 12/21/2015    PSA 2.49 09/20/2012    PSA 2.14 10/17/2011       Most recent UA:  Lab Results   Component Value Date    COL STRAW (A) 02/15/2019    UAPP CLEAR 02/15/2019    USPG >=1.030 06/26/2019    UPH 5.5 06/26/2019    UPROT 30 mg/dL 06/26/2019    UGLU 100mg/dL 06/26/2019    UKET neg 06/26/2019    UBILI neg 06/26/2019    URBC neg 06/26/2019    UNITR neg 06/26/2019    UROB 0.2 06/26/2019    UWBC neg 06/26/2019       No components found for: CREAT    Lab Results " Component Value Date    HGB 14.6 06/20/2019    HCT 45.0 06/20/2019    MCV 90.5 06/20/2019    WBC 5.3 06/20/2019     06/20/2019     01/23/2012        ASSESSMENT:    Â· High volume, High risk Prostate cancer ([place] 5+5) s/p XRT completed on 2/2018 and currently on adjuvant ADT to be continued for 3 years with Dr. Arrington. PSA 0.06 obtained on 6/20/19. Â· LUTS with urgency incontinence/OAB likely 2/2 to prior XRT. Â· History of acute urinary retention    PLAN:   -Patient was seen, examined, pertinent labs and images have been reviewed. -PSA noted above  -Continue prostate cancer management with Dr. Arrington  -Follow up in 6 months with urology.  -Patient was encouraged and instructed on Kegel exercises, timed void, and bladder retraining.  -Patient was encouraged to have daily bowel movements to avoid constipation and encourage to use stool softeners (Miralax, Dulcolax, ect.), increase fiber intake, and probiotics (Align Probiotic, Mikki [facility] Flowonix, ect.). -I discussed behavioral modification, such as reducing caffeine, alcohol, spicy foods, artificial sweeteners, carbonated drinks, and acidic foods.  -Patient verbalized understanding.      Asher Nieto DO

## 2024-05-22 NOTE — TELEPHONE ENCOUNTER
Please try to avoid signing forms in the corner as it is not visible when printing and forms are not accepted this way. Thank you!     Dr. Mesa,     *The ACKNOWLEDGE button has been moved to the top right ribbon*    Please sign off on form if you agree to: Family Medical Leave Act 05/10/2024-05/28/2024  (place your signature on the first page only)    -From your Inbasket, Highlight the patient and click Chart   -Double click the 05/20/2024 Forms Completion telephone encounter  -Scroll down to the Media section   -Click the blue Hyperlink: Family Medical Leave Act Dr. Kennedy Mesa 05/22/2024  -Click Acknowledge located in the top right ribbon/menu   -Drag the mouse into the blank space of the document and a + sign will appear. Left click to   electronically sign the document.     Thank you,  Jovana HENRIQUEZ

## 2024-05-22 NOTE — TELEPHONE ENCOUNTER
Hi Dr. Mesa,     Onbase has been moving a little slower today, it is now visible if you would be so kind as to try again..please.     Thank you so much,   Jovana HENRIQUEZ

## 2024-05-23 ENCOUNTER — HOSPITAL ENCOUNTER (OUTPATIENT)
Dept: RESPIRATORY THERAPY | Facility: HOSPITAL | Age: 49
Discharge: HOME | End: 2024-05-23
Attending: FAMILY MEDICINE

## 2024-05-23 DIAGNOSIS — J45.50 SEVERE PERSISTENT ASTHMA WITHOUT COMPLICATION (HCC): ICD-10-CM

## 2024-05-23 PROCEDURE — 94060 EVALUATION OF WHEEZING: CPT

## 2024-05-23 PROCEDURE — 94726 PLETHYSMOGRAPHY LUNG VOLUMES: CPT

## 2024-05-23 PROCEDURE — 94729 DIFFUSING CAPACITY: CPT

## 2024-05-23 NOTE — TELEPHONE ENCOUNTER
Family Medical Leave Act forms faxed successfully to [address] @ [phone] as requested, sending mychart message to adv patient. Archived form.

## 2024-05-23 NOTE — TELEPHONE ENCOUNTER
Family Medical Leave Act forms faxed to the attention of Hortencia Baker/ [address] pending confirmation.

## 2024-05-24 PROBLEM — J44.9 COPD (CHRONIC OBSTRUCTIVE PULMONARY DISEASE) (HCC): Status: ACTIVE | Noted: 2022-09-29

## 2024-05-24 NOTE — PROCEDURES
[facility]  part of [facility]     Pulmonary Function Test     Jaye Abebe Patient Status:  Outpatient    1975 MRN [id number]   Date of Exam 24 PCP Viviana Hodgson MD           Spirometry   FEV1: 2.22 86%  FVC: 3.42 109%  FEV1/FVC: 0.65    Lung Volume   T.76 114%  RV : 2.23 159%    Diffusion Capacity   DLCO: 24.15 119%    Flow Volume Loop       Impression   Mild obstructive defect seen without significant postbronchodilator response observed.  Normal lung volumes with air trapping seen.  Normal diffusion past    iKsha Gan DO  Pulmonary Critical Care Medicine  [facility]

## 2024-06-06 ENCOUNTER — LAB ENCOUNTER (OUTPATIENT)
Dept: LAB | Age: 49
End: 2024-06-06
Attending: FAMILY MEDICINE
Payer: COMMERCIAL

## 2024-06-06 DIAGNOSIS — E83.52 HYPERCALCEMIA: ICD-10-CM

## 2024-06-06 DIAGNOSIS — D58.2 ELEVATED HEMOGLOBIN (HCC): ICD-10-CM

## 2024-06-06 DIAGNOSIS — R74.8 ELEVATED ALKALINE PHOSPHATASE LEVEL: ICD-10-CM

## 2024-06-06 PROBLEM — R79.89 INCREASED PTH LEVEL: Status: ACTIVE | Noted: 2024-06-06

## 2024-06-06 LAB
ALBUMIN SERPL-MCNC: 5.2 G/DL (ref 3.2–4.8)
ALBUMIN/GLOB SERPL: 1.9 {RATIO} (ref 1–2)
ALP LIVER SERPL-CCNC: 197 U/L
ALT SERPL-CCNC: 27 U/L
ANION GAP SERPL CALC-SCNC: 11 MMOL/L (ref 0–18)
AST SERPL-CCNC: 24 U/L (ref ?–34)
BASOPHILS # BLD AUTO: 0.06 X10(3) UL (ref 0–0.2)
BASOPHILS NFR BLD AUTO: 0.5 %
BILIRUB SERPL-MCNC: 0.4 MG/DL (ref 0.3–1.2)
BUN BLD-MCNC: 15 MG/DL (ref 9–23)
BUN/CREAT SERPL: 14 (ref 10–20)
CALCIUM BLD-MCNC: 11.4 MG/DL (ref 8.7–10.4)
CHLORIDE SERPL-SCNC: 107 MMOL/L (ref 98–112)
CO2 SERPL-SCNC: 23 MMOL/L (ref 21–32)
CREAT BLD-MCNC: 1.07 MG/DL
DEPRECATED RDW RBC AUTO: 44.8 FL (ref 35.1–46.3)
EGFRCR SERPLBLD CKD-EPI 2021: 64 ML/MIN/1.73M2 (ref 60–?)
EOSINOPHIL # BLD AUTO: 0.21 X10(3) UL (ref 0–0.7)
EOSINOPHIL NFR BLD AUTO: 1.6 %
ERYTHROCYTE [DISTWIDTH] IN BLOOD BY AUTOMATED COUNT: 13.4 % (ref 11–15)
FASTING STATUS PATIENT QL REPORTED: YES
GGT SERPL-CCNC: 72 U/L
GLOBULIN PLAS-MCNC: 2.8 G/DL (ref 2–3.5)
GLUCOSE BLD-MCNC: 96 MG/DL (ref 70–99)
HCT VFR BLD AUTO: 51.1 %
HGB BLD-MCNC: 16.4 G/DL
IMM GRANULOCYTES # BLD AUTO: 0.08 X10(3) UL (ref 0–1)
IMM GRANULOCYTES NFR BLD: 0.6 %
LYMPHOCYTES # BLD AUTO: 4.64 X10(3) UL (ref 1–4)
LYMPHOCYTES NFR BLD AUTO: 35 %
MCH RBC QN AUTO: 28.9 PG (ref 26–34)
MCHC RBC AUTO-ENTMCNC: 32.1 G/DL (ref 31–37)
MCV RBC AUTO: 90.1 FL
MONOCYTES # BLD AUTO: 0.69 X10(3) UL (ref 0.1–1)
MONOCYTES NFR BLD AUTO: 5.2 %
NEUTROPHILS # BLD AUTO: 7.59 X10 (3) UL (ref 1.5–7.7)
NEUTROPHILS # BLD AUTO: 7.59 X10(3) UL (ref 1.5–7.7)
NEUTROPHILS NFR BLD AUTO: 57.1 %
OSMOLALITY SERPL CALC.SUM OF ELEC: 293 MOSM/KG (ref 275–295)
PLATELET # BLD AUTO: 403 10(3)UL (ref 150–450)
POTASSIUM SERPL-SCNC: 4 MMOL/L (ref 3.5–5.1)
PROT SERPL-MCNC: 8 G/DL (ref 5.7–8.2)
PTH-INTACT SERPL-MCNC: 108.1 PG/ML (ref 18.5–88)
RBC # BLD AUTO: 5.67 X10(6)UL
SODIUM SERPL-SCNC: 141 MMOL/L (ref 136–145)
WBC # BLD AUTO: 13.3 X10(3) UL (ref 4–11)

## 2024-06-06 PROCEDURE — 80053 COMPREHEN METABOLIC PANEL: CPT

## 2024-06-06 PROCEDURE — 84075 ASSAY ALKALINE PHOSPHATASE: CPT | Performed by: FAMILY MEDICINE

## 2024-06-06 PROCEDURE — 85025 COMPLETE CBC W/AUTO DIFF WBC: CPT

## 2024-06-06 PROCEDURE — 84080 ASSAY ALKALINE PHOSPHATASES: CPT | Performed by: FAMILY MEDICINE

## 2024-06-06 PROCEDURE — 36415 COLL VENOUS BLD VENIPUNCTURE: CPT

## 2024-06-06 PROCEDURE — 82977 ASSAY OF GGT: CPT

## 2024-06-06 PROCEDURE — 83970 ASSAY OF PARATHORMONE: CPT

## 2024-06-07 LAB
ALK PHOSPHATASE: 203 IU/L
BONE FRAC: 45 %
INTESTINAL FRAC: 17 %
LIVER FRAC: 38 %

## 2024-06-10 DIAGNOSIS — E66.01 MORBID OBESITY WITH BMI OF 40.0-44.9, ADULT (HCC): ICD-10-CM

## 2024-06-10 RX ORDER — PHENTERMINE HYDROCHLORIDE 37.5 MG/1
37.5 TABLET ORAL
Qty: 30 TABLET | Refills: 2 | Status: SHIPPED | OUTPATIENT
Start: 2024-06-10

## 2024-06-13 DIAGNOSIS — F43.9 REACTION TO SEVERE STRESS, UNSPECIFIED: ICD-10-CM

## 2024-06-13 DIAGNOSIS — R63.2 POLYPHAGIA: ICD-10-CM

## 2024-06-13 RX ORDER — SERTRALINE HYDROCHLORIDE 100 MG/1
200 TABLET, FILM COATED ORAL DAILY
Qty: 180 TABLET | Refills: 0 | Status: SHIPPED | OUTPATIENT
Start: 2024-06-13

## 2024-06-21 ENCOUNTER — PATIENT MESSAGE (OUTPATIENT)
Dept: INTERNAL MEDICINE CLINIC | Facility: CLINIC | Age: 49
End: 2024-06-21

## 2024-06-21 DIAGNOSIS — Z76.89 ENCOUNTER FOR WEIGHT MANAGEMENT: Primary | ICD-10-CM

## 2024-06-21 NOTE — TELEPHONE ENCOUNTER
Referral placed for Dr. Eason.and routed to manage care to expedite since patient has appointment on 6/26/24

## 2024-06-21 NOTE — TELEPHONE ENCOUNTER
From: Jaye Abebe  To: Kennedy Mesa  Sent: 6/21/2024 2:06 PM CDT  Subject: Referral     Hello,    I have an appointment scheduled to see Dr. Eason on Wednesday. They are indicating that I need a referral. Can you please send it over to his office and let me know when done?    Thanks,  Jaye Abebe

## 2024-06-24 NOTE — TELEPHONE ENCOUNTER
No 2024 referral for or appt completion noted for Dietician for wt loss. This a requirement before pt can be referred to bariatrics.  Last RD / dietician appt in 2023. Unclear if she needs RD appt in 2024 also.    Await response from managed Care re: auth of bariatric visit request. Referral to dietician placed today in event needed while awaiting referral auth from [facility] for Dr Eason.        Await managed care response to bariatrics referral  placed 6/21/24

## 2024-06-26 ENCOUNTER — OFFICE VISIT (OUTPATIENT)
Dept: HEMATOLOGY/ONCOLOGY | Facility: HOSPITAL | Age: 49
End: 2024-06-26
Attending: FAMILY MEDICINE

## 2024-06-26 ENCOUNTER — LAB ENCOUNTER (OUTPATIENT)
Dept: LAB | Facility: HOSPITAL | Age: 49
End: 2024-06-26
Attending: STUDENT IN AN ORGANIZED HEALTH CARE EDUCATION/TRAINING PROGRAM

## 2024-06-26 VITALS
BODY MASS INDEX: 44.47 KG/M2 | TEMPERATURE: 98 F | OXYGEN SATURATION: 95 % | HEIGHT: 63 IN | SYSTOLIC BLOOD PRESSURE: 112 MMHG | RESPIRATION RATE: 16 BRPM | WEIGHT: 251 LBS | HEART RATE: 102 BPM | DIASTOLIC BLOOD PRESSURE: 75 MMHG

## 2024-06-26 DIAGNOSIS — D58.2 ELEVATED HEMOGLOBIN (HCC): ICD-10-CM

## 2024-06-26 DIAGNOSIS — D72.829 LEUKOCYTOSIS, UNSPECIFIED TYPE: Primary | ICD-10-CM

## 2024-06-26 DIAGNOSIS — D72.829 LEUKOCYTOSIS, UNSPECIFIED TYPE: ICD-10-CM

## 2024-06-26 LAB
DEPRECATED HBV CORE AB SER IA-ACNC: 92 NG/ML
IRON SATN MFR SERPL: 23 %
IRON SERPL-MCNC: 90 UG/DL
TIBC SERPL-MCNC: 387 UG/DL (ref 250–425)
TRANSFERRIN SERPL-MCNC: 260 MG/DL (ref 250–380)

## 2024-06-26 PROCEDURE — 83540 ASSAY OF IRON: CPT

## 2024-06-26 PROCEDURE — 88189 FLOWCYTOMETRY/READ 16 & >: CPT

## 2024-06-26 PROCEDURE — 88184 FLOWCYTOMETRY/ TC 1 MARKER: CPT

## 2024-06-26 PROCEDURE — 36415 COLL VENOUS BLD VENIPUNCTURE: CPT

## 2024-06-26 PROCEDURE — 88185 FLOWCYTOMETRY/TC ADD-ON: CPT

## 2024-06-26 PROCEDURE — 82728 ASSAY OF FERRITIN: CPT

## 2024-06-26 PROCEDURE — 84466 ASSAY OF TRANSFERRIN: CPT

## 2024-06-26 PROCEDURE — 82668 ASSAY OF ERYTHROPOIETIN: CPT

## 2024-06-26 PROCEDURE — 81208 BCR/ABL1 GENE OTHER BP: CPT

## 2024-06-26 PROCEDURE — 81270 JAK2 GENE: CPT

## 2024-06-26 PROCEDURE — 81207 BCR/ABL1 GENE MINOR BP: CPT

## 2024-06-26 PROCEDURE — 81206 BCR/ABL1 GENE MAJOR BP: CPT

## 2024-06-26 PROCEDURE — 99204 OFFICE O/P NEW MOD 45 MIN: CPT | Performed by: STUDENT IN AN ORGANIZED HEALTH CARE EDUCATION/TRAINING PROGRAM

## 2024-06-26 NOTE — CONSULTS
[facility]  Initial Hematology Clinic Consult Note    Patient Name: Jaye Abebe   YOB: 1975   Medical Record Number: [id number]  Referring Physician(s): Kennedy Mesa    Reason for consultation: Leukocytosis, erythrocytosis    Subjective:   Jaye Abebe is a 49 year old female with a history of ongoing tobacco abuse, asthma, hypertension, depression, PTSD and other comorbidities who presents to hematology regarding leukocytosis and erythrocytosis.  In May the patient had a white blood cell count 13.6, ANC 8.3, hemoglobin 17.5 platelet count 416.  This was repeated in early June showed a white blood cell count 13.3 ANC 7.6, ALC 4.6, hemoglobin 16.4.  Upon chart review, the patient has had an intermittently elevated white blood cell count dating back to 2006, however hemoglobin previously normal until recent values.    Clinically, she has multiple complaints including fatigue feeling unwell, night sweats and chills, she also has headaches.  Sometimes she will feel hot and flushed after shower to the point where she feels like she may faint.  She admits to multiple stressors in her life currently.    Review of Systems:  Hematology/Oncology ROS performed and negative except as above in HPI    History/Other:   Past Medical History:  Past Medical History:    Bilateral carpal tunnel syndrome    Current moderate episode of major depressive disorder without prior episode (HCC)    Essential hypertension    Focal nodular hyperplasia of liver    Generalized anxiety disorder    Hyperlipidemia    Irritable bowel syndrome with diarrhea    PTSD (post-traumatic stress disorder)    Severe persistent asthma without complication (HCC)       Past Surgical History:  Past Surgical History:   Procedure Laterality Date    Cholecystectomy  2002    Colonoscopy N/A 01/30/2018    Procedure: COLONOSCOPY;  Surgeon: Timbo Sue MD;  Location: [facility] ENDOSCOPY    Colonoscopy N/A 03/30/2021     Procedure: COLONOSCOPY;  Surgeon: Timbo Sue MD;  Location: [facility] ENDOSCOPY    Cyst aspiration left      Knee arthroscopy  1994       Current Medications:   buPROPion ER (WELLBUTRIN XL) 150 MG Oral Tablet 24 Hr Take 1 tablet (150 mg total) by mouth every morning. 30 tablet 1    hydrOXYzine 10 MG Oral Tab Take 1 tablet (10 mg total) by mouth every 6 (six) hours as needed for Anxiety. 30 tablet 1    sertraline 100 MG Oral Tab Take 1 tablet (100 mg total) by mouth daily. 30 tablet 1    QUEtiapine (SEROQUEL) 25 MG Oral Tab Take 1 tablet (25 mg total) by mouth nightly. 30 tablet 1    PHENTERMINE HCL 37.5 MG Oral Tab TAKE 1 TABLET BY MOUTH BEFORE BREAKFAST. 30 tablet 2    tiotropium (SPIRIVA HANDIHALER) 18 MCG Inhalation Cap Inhale 1 capsule (18 mcg total) into the lungs daily. 90 capsule 0    Nystatin 581277 UNIT/GM External Powder Apply 1 Application  topically 4 (four) times daily. Apply to affected areas 60 g 1    hydroCHLOROthiazide 12.5 MG Oral Cap Take 1 capsule (12.5 mg total) by mouth daily. 90 capsule 3    topiramate 25 MG Oral Tab Take 1 tablet (25 mg total) by mouth 2 (two) times daily. 180 tablet 1    albuterol 108 (90 Base) MCG/ACT Inhalation Aero Soln Inhale 2 puffs into the lungs every 6 (six) hours as needed for Wheezing or Shortness of Breath. 3 each 3    lisinopril 10 MG Oral Tab Take 1 tablet (10 mg total) by mouth daily. 90 tablet 3    levonorgestrel 20 MCG/24HR Intrauterine IUD Use as directed  Mirena Lot [id number]   11-10         Allergies:   No Known Allergies    Family Medical History:  Family History   Problem Relation Age of Onset    Depression Mother     No Known Problems Father         Not in contact    Cancer Sister 18        Hodgkin's Lymphoma    No Known Problems Sister     Breast Cancer Maternal Grandmother 50    Dementia Maternal Grandfather     No Known Problems Paternal Grandmother     Diabetes Paternal Grandfather     Colon Cancer Paternal Uncle         50s    Schizophrenia  Maternal Aunt     Mental Disorder Maternal Cousin        Social History:  Social History     Socioeconomic History    Marital status:      Spouse name: Not on file    Number of children: Not on file    Years of education: Not on file    Highest education level: Not on file   Occupational History    Not on file   Tobacco Use    Smoking status: Every Day     Current packs/day: 0.50     Average packs/day: 0.5 packs/day for 35.5 years (17.7 ttl pk-yrs)     Types: Cigarettes     Start date: 1989    Smokeless tobacco: Never   Vaping Use    Vaping status: Never Used   Substance and Sexual Activity    Alcohol use: Yes     Comment: Occasional - once a month    Drug use: Yes     Types: Cannabis     Comment: has a medical card     Sexual activity: Yes     Birth control/protection: Mirena     Comment: 03/2018   Other Topics Concern     Service Not Asked    Blood Transfusions Not Asked    Caffeine Concern Yes     Comment: 1 cup daily soda/coffee    Occupational Exposure Not Asked    Hobby Hazards Not Asked    Sleep Concern Not Asked    Stress Concern Not Asked    Weight Concern Not Asked    Special Diet Not Asked    Back Care Not Asked    Exercise Not Asked    Bike Helmet Not Asked    Seat Belt Not Asked    Self-Exams Not Asked    Grew up on a farm Not Asked    History of tanning Not Asked    Outdoor occupation Not Asked    Pt has a pacemaker No    Pt has a defibrillator No    Breast feeding No    Reaction to local anesthetic No   Social History Narrative    Relationships:  - Danny*    Children: Darin* (adult son, history of bipolar), Colin and Ancelmo (twins - 15M), Elsie (teacher)    Pets: Dog    School: N/A    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 rate 16, height 1.6 m (5' 3\"), weight 113.9 kg (251 lb), SpO2 95%, not currently breastfeeding.  Physical Exam:  General: A&Ox3, NAD  HEENT: PERRL, OP clear  CV: RRR, no murmurs, + pulses  Pulm: CTA b/l, no w/r/r, normal effort  Abd: soft, ntnd, normal bowel sounds  Lymph: no palpable lymphadenopathy   Extremities: no edema  Neurological: grossly intact    Results:   Labs:  Lab Results   Component Value Date    WBC 13.3 (H) 06/06/2024    HGB 16.4 (H) 06/06/2024    HCT 51.1 (H) 06/06/2024    .0 06/06/2024    CREATSERUM 1.07 (H) 06/06/2024    BUN 15 06/06/2024     06/06/2024    K 4.0 06/06/2024     06/06/2024    CO2 23.0 06/06/2024    GLU 96 06/06/2024    CA 11.4 (H) 06/06/2024    ALB 5.2 (H) 06/06/2024    ALKPHO 197 (H) 06/06/2024    BILT 0.4 06/06/2024    TP 8.0 06/06/2024    AST 24 06/06/2024    ALT 27 06/06/2024    T4F 1.4 05/16/2024    TSH 0.525 (L) 05/16/2024    GGT 72 (H) 06/06/2024    MG 1.7 (L) 01/30/2018    PHOS 2.7 01/30/2018    B12 261 10/10/2022     Assessment & Plan:   Jaye Abebe is a 49 year old female with a history of ongoing tobacco abuse, asthma, hypertension, depression, PTSD and other comorbidities who presents to hematology regarding leukocytosis and erythrocytosis.  In May the patient had a white blood cell count 13.6, ANC 8.3, hemoglobin 17.5 platelet count 416.  This was repeated in early June showed a white blood cell count 13.3 ANC 7.6, ALC 4.6, hemoglobin 16.4.  Upon chart review, the patient has had an intermittently elevated white blood cell count dating back to 2006, however hemoglobin previously normal until recent values.    Longstanding leukocytosis suspected to be due to tobacco abuse however hemoglobin only more recently has been elevated.  She is also developed a number of clinical  symptoms recently.  Because of this, we will perform a thorough workup including repeat CBC, EPO, flow cytometry, BCR-ABL and JAK2 reflexive testing.  We will plan to discuss these results over the phone in about 2 weeks when I anticipate their return.    MDM Moderate: undiagnosed new problem with uncertain prognosis; review of notes and tests, ordering of tests, independent interpretation of tests; low risk of morbidity from additional testing/treatment    Ethan Dumas DO    [facility]  [place] [facility]    This note was created using a voice-recognition transcribing system. Incorrect words or phrases may have been missed during proofreading. Please interpret accordingly.

## 2024-06-27 LAB
CD10 CELLS NFR SPEC: <1 %
CD10/CD19: <1 %
CD19 CELLS NFR SPEC: 7 %
CD19+/CD200+: 4 %
CD2 CELLS NFR SPEC: 90 %
CD20 CELLS NFR SPEC: 7 %
CD200 CELLS: 8 %
CD3 CELLS NFR SPEC: 81 %
CD3+/TCRGD+: 1 %
CD3+CD4+ CELLS NFR SPEC: 63 %
CD3+CD4+ CELLS/CD3+CD8+ CLL SPEC: 3.3
CD3+CD8+ CELLS NFR SPEC: 19 %
CD3-/CD56+: 11 %
CD34 CELLS NFR SPEC: <1 %
CD38 CELLS NFR SPEC: 2 %
CD38+/CD19+: <1 %
CD45 CELLS NFR SPEC: 100 %
CD5 CELLS NFR SPEC: 81 %
CD5/CD19 CELLS: 1 %
CD7 CELLS NFR SPEC: 88 %
CELL SURF KAPPA/LAMBDA RATIO: 2
CELL SURF LAMBDA LIGHT CHAIN: 2 %
CELL SURFACE KAPPA LIGHT CHAIN: 4 %
TCR G-D CELLS NFR SPEC: 1 %

## 2024-06-28 LAB — ERYTHROPOIETIN: 8.4 MIU/ML

## 2024-07-03 ENCOUNTER — TELEPHONE (OUTPATIENT)
Dept: HEMATOLOGY/ONCOLOGY | Facility: HOSPITAL | Age: 49
End: 2024-07-03

## 2024-07-03 LAB — INTERP BCRABL: NEGATIVE

## 2024-07-03 NOTE — TELEPHONE ENCOUNTER
Danny  [phone]   My wife Jaye is in the hospital here in [place] at  [facility]  P) [phone].  When we arrived two days ago we went to ER  they said she has a UTI was also Dehydrated.  They placed her on IV Fluids and IV Penicillin and Oxygen.  Her BP was 84 / 74 when we got here. They found Low hemoglobin and a kidney problem. Her white count  low also. They are  going to keep her another night.  I would like to know if  Dr. Dumas has spoke with these doctors?  They said they were trying to reach him. Thanks Beth

## 2024-07-10 ENCOUNTER — TELEPHONE (OUTPATIENT)
Dept: HEMATOLOGY/ONCOLOGY | Facility: HOSPITAL | Age: 49
End: 2024-07-10

## 2024-07-10 NOTE — TELEPHONE ENCOUNTER
Writer called patient spouse regarding patient's recent hospitalization. He informed writer that patient has been discharged from the hospital and recovering at home. Patient to be called for phone follow up tomorrow as scheduled with Dr. Dumas.

## 2024-07-11 ENCOUNTER — VIRTUAL PHONE E/M (OUTPATIENT)
Dept: HEMATOLOGY/ONCOLOGY | Facility: HOSPITAL | Age: 49
End: 2024-07-11
Attending: STUDENT IN AN ORGANIZED HEALTH CARE EDUCATION/TRAINING PROGRAM
Payer: COMMERCIAL

## 2024-07-11 DIAGNOSIS — D58.2 ELEVATED HEMOGLOBIN (HCC): ICD-10-CM

## 2024-07-11 DIAGNOSIS — D72.829 LEUKOCYTOSIS, UNSPECIFIED TYPE: Primary | ICD-10-CM

## 2024-07-11 NOTE — PROGRESS NOTES
Patient was admitted to hospital in [place] with sepsis and UTI.  She was given antibiotics she is feeling much better.  All the complaints she was having are gone.  Extensive workup for polycythemia was unremarkable.  We will repeat a CBC and CMP at the end of the month when she returns from [place] and touch base over the phone at that time.    Ethan Dumas DO  [facility]  [facility]

## 2024-07-19 ENCOUNTER — PATIENT MESSAGE (OUTPATIENT)
Dept: INTERNAL MEDICINE CLINIC | Facility: CLINIC | Age: 49
End: 2024-07-19

## 2024-07-22 NOTE — TELEPHONE ENCOUNTER
Please notify patient - If not feeling well, recommend following up with me closely for a hospital follow up. However if feeling better, can see me during appointment in August and we can assess if repeat UA is necessary at that time. Thanks, Kennedy

## 2024-07-22 NOTE — TELEPHONE ENCOUNTER
Impression: Pinguecula, bilateral: H11.153. Plan: No treatment is required at this time. Will continue to observe condition and or symptoms. Left a message on Jaye's voice mail to refer to schoox message for Dr. Mesa's response.

## 2024-07-22 NOTE — TELEPHONE ENCOUNTER
Patent hospitalized in [place] for septic shock due to UTI. Patient is requesting lab order for urine C&S upon her return. Patient has physical scheduled on 8/8/24 with Dr. Mesa. Does Dr. Mesa advise the patient schedule a separate hospital follow-up prior this physical and if Dr. Mesa will order urine C&S?    Please advise.

## 2024-07-22 NOTE — TELEPHONE ENCOUNTER
From: Jaye Abebe  To: Kennedy Mesa  Sent: 7/19/2024 4:18 PM CDT  Subject: UTI    Hello,    I’ve been on vacation and was admitted to the hospital because of septic shock caused by a UTI that was not detected. As a result, I was put on an antibiotic and all levels of white and red blood counts went down. Dr. Dumas put in an order for complete panel of blood work to be done when I return on the 29th.   Adelina, his nurse, would not put in for urine culture. Would you be able to put that through for me for when I return? I just want to make sure it has been cured.   I have scheduled an appointment with you in August. I will have my records from the hospital in [place].   Thanks  Jaye

## 2024-07-30 ENCOUNTER — OFFICE VISIT (OUTPATIENT)
Dept: INTERNAL MEDICINE CLINIC | Facility: CLINIC | Age: 49
End: 2024-07-30
Payer: COMMERCIAL

## 2024-07-30 ENCOUNTER — PATIENT MESSAGE (OUTPATIENT)
Dept: INTERNAL MEDICINE CLINIC | Facility: CLINIC | Age: 49
End: 2024-07-30

## 2024-07-30 VITALS
SYSTOLIC BLOOD PRESSURE: 160 MMHG | HEART RATE: 120 BPM | BODY MASS INDEX: 45.25 KG/M2 | OXYGEN SATURATION: 97 % | WEIGHT: 255.38 LBS | DIASTOLIC BLOOD PRESSURE: 98 MMHG | HEIGHT: 63 IN

## 2024-07-30 DIAGNOSIS — F32.1 CURRENT MODERATE EPISODE OF MAJOR DEPRESSIVE DISORDER WITHOUT PRIOR EPISODE (HCC): ICD-10-CM

## 2024-07-30 DIAGNOSIS — E27.9 ADRENAL NODULE (HCC): ICD-10-CM

## 2024-07-30 DIAGNOSIS — R06.83 SNORING: ICD-10-CM

## 2024-07-30 DIAGNOSIS — J44.9 CHRONIC OBSTRUCTIVE PULMONARY DISEASE, UNSPECIFIED COPD TYPE (HCC): ICD-10-CM

## 2024-07-30 DIAGNOSIS — F41.1 GENERALIZED ANXIETY DISORDER: ICD-10-CM

## 2024-07-30 DIAGNOSIS — R00.2 PALPITATIONS: ICD-10-CM

## 2024-07-30 DIAGNOSIS — E05.90 SUBCLINICAL HYPERTHYROIDISM: ICD-10-CM

## 2024-07-30 DIAGNOSIS — R79.89 INCREASED PTH LEVEL: ICD-10-CM

## 2024-07-30 DIAGNOSIS — R65.10 SIRS (SYSTEMIC INFLAMMATORY RESPONSE SYNDROME) (HCC): Primary | ICD-10-CM

## 2024-07-30 DIAGNOSIS — F43.10 PTSD (POST-TRAUMATIC STRESS DISORDER): ICD-10-CM

## 2024-07-30 DIAGNOSIS — I10 ESSENTIAL HYPERTENSION: ICD-10-CM

## 2024-07-30 PROBLEM — A41.9 SEPSIS (HCC): Status: ACTIVE | Noted: 2024-07-30

## 2024-07-30 PROCEDURE — 3077F SYST BP >= 140 MM HG: CPT | Performed by: FAMILY MEDICINE

## 2024-07-30 PROCEDURE — 3080F DIAST BP >= 90 MM HG: CPT | Performed by: FAMILY MEDICINE

## 2024-07-30 PROCEDURE — 3008F BODY MASS INDEX DOCD: CPT | Performed by: FAMILY MEDICINE

## 2024-07-30 PROCEDURE — 99417 PROLNG OP E/M EACH 15 MIN: CPT | Performed by: FAMILY MEDICINE

## 2024-07-30 PROCEDURE — 99215 OFFICE O/P EST HI 40 MIN: CPT | Performed by: FAMILY MEDICINE

## 2024-07-30 NOTE — ASSESSMENT & PLAN NOTE
Patient with generalized anxiety disorder, major depressive disorder as well as PTSD.  Continue to see psychiatry.   After recent hospitalization, now currently only on sertraline 100 mg daily.  Advised close follow up to manage.

## 2024-07-30 NOTE — ASSESSMENT & PLAN NOTE
Patient with recent SIRS in [place]  Chart reviewed extensively after visit.  Treated empirically with cefepime, discharged on bactrim.   Urine culture without growth.  Blood culture without growth.  Possible heat exhaustion? Or gastroenteritis?  Feeling better at this time.   Was hypotensive during the hospitalization, but blood pressures seem to be high again.  Will monitor labs including repeat CBC, CMP, CK  Stay hydrated
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
## 2024-07-30 NOTE — PATIENT INSTRUCTIONS
PATIENT INSTRUCTIONS    Thank you for seeing me today, it was a pleasure taking care of you.  Please check out at the  and schedule a follow up appointment.  Return in about 2 weeks (around 8/13/2024) for follow up.  Please remember that the obed period for all appointments is 5 minutes. This is to help maximize the amount of time that we can spend together at our visits.    Blood work today  Try to get [facility] records synced with [place] - most important is discharge summary  Pulmonology - sleep evaluation  Heart monitor  See psychiatry  Please monitor for a call from [facility]. Someone will reach out to you to speak with you.  If you do not hear a response from [facility] in 1 week, please call them at [phone].   Restart lisinopril - increase to 5 mg daily for 3 days  Monitor blood pressures  If >140/90 consistently, increase to 10 mg daily     Best,  Dr. Mesa

## 2024-07-30 NOTE — ASSESSMENT & PLAN NOTE
Patient with a history of COPD  Currently on Symbicort 160-4.5 mcg,  Spiriva 18 mcg daily.  Using albuterol on a regular basis.  Smoking cessation advised

## 2024-07-30 NOTE — ASSESSMENT & PLAN NOTE
Patient with intermittent palpitations  States that she had a normal EKG during recent hospitalization  Will order Holter monitor to evaluate

## 2024-07-30 NOTE — ASSESSMENT & PLAN NOTE
Blood pressure are elevated in the office today  She has only been taking lisinopril 2.5 mg daily.  Advised increasing lisinopril to 5 mg daily  If blood pressure >140/90 after 3 days, then return back to original lisinopril 10 mg daily.  Can hold off on hydrochlorothiazide for now.   Follow up in 2 weeks to reassess.

## 2024-07-31 ENCOUNTER — NURSE TRIAGE (OUTPATIENT)
Dept: INTERNAL MEDICINE CLINIC | Facility: CLINIC | Age: 49
End: 2024-07-31

## 2024-07-31 ENCOUNTER — PATIENT MESSAGE (OUTPATIENT)
Dept: INTERNAL MEDICINE CLINIC | Facility: CLINIC | Age: 49
End: 2024-07-31

## 2024-07-31 ENCOUNTER — LAB ENCOUNTER (OUTPATIENT)
Dept: LAB | Age: 49
End: 2024-07-31
Attending: FAMILY MEDICINE
Payer: COMMERCIAL

## 2024-07-31 DIAGNOSIS — D58.2 ELEVATED HEMOGLOBIN (HCC): ICD-10-CM

## 2024-07-31 DIAGNOSIS — D72.829 LEUKOCYTOSIS, UNSPECIFIED TYPE: ICD-10-CM

## 2024-07-31 DIAGNOSIS — E05.90: ICD-10-CM

## 2024-07-31 DIAGNOSIS — A41.9 SEPSIS, DUE TO UNSPECIFIED ORGANISM, UNSPECIFIED WHETHER ACUTE ORGAN DYSFUNCTION PRESENT (HCC): Primary | ICD-10-CM

## 2024-07-31 LAB
ALBUMIN SERPL-MCNC: 4.4 G/DL (ref 3.2–4.8)
ALBUMIN/GLOB SERPL: 1.7 {RATIO} (ref 1–2)
ALP LIVER SERPL-CCNC: 171 U/L
ALT SERPL-CCNC: 19 U/L
ANION GAP SERPL CALC-SCNC: 5 MMOL/L (ref 0–18)
AST SERPL-CCNC: 19 U/L (ref ?–34)
BASOPHILS # BLD AUTO: 0.05 X10(3) UL (ref 0–0.2)
BASOPHILS NFR BLD AUTO: 0.5 %
BILIRUB SERPL-MCNC: 0.4 MG/DL (ref 0.3–1.2)
BILIRUB UR QL: NEGATIVE
BUN BLD-MCNC: 13 MG/DL (ref 9–23)
BUN/CREAT SERPL: 14.8 (ref 10–20)
CALCIUM BLD-MCNC: 10.6 MG/DL (ref 8.7–10.4)
CHLORIDE SERPL-SCNC: 110 MMOL/L (ref 98–112)
CK SERPL-CCNC: 116 U/L
CLARITY UR: CLEAR
CO2 SERPL-SCNC: 29 MMOL/L (ref 21–32)
COLOR UR: COLORLESS
CREAT BLD-MCNC: 0.88 MG/DL
DEPRECATED RDW RBC AUTO: 46.5 FL (ref 35.1–46.3)
EGFRCR SERPLBLD CKD-EPI 2021: 81 ML/MIN/1.73M2 (ref 60–?)
EOSINOPHIL # BLD AUTO: 0.16 X10(3) UL (ref 0–0.7)
EOSINOPHIL NFR BLD AUTO: 1.6 %
ERYTHROCYTE [DISTWIDTH] IN BLOOD BY AUTOMATED COUNT: 13.8 % (ref 11–15)
FASTING STATUS PATIENT QL REPORTED: NO
GLOBULIN PLAS-MCNC: 2.6 G/DL (ref 2–3.5)
GLUCOSE BLD-MCNC: 90 MG/DL (ref 70–99)
GLUCOSE UR-MCNC: NORMAL MG/DL
HCT VFR BLD AUTO: 44.5 %
HGB BLD-MCNC: 14.1 G/DL
HGB UR QL STRIP.AUTO: NEGATIVE
IMM GRANULOCYTES # BLD AUTO: 0.06 X10(3) UL (ref 0–1)
IMM GRANULOCYTES NFR BLD: 0.6 %
KETONES UR-MCNC: NEGATIVE MG/DL
LEUKOCYTE ESTERASE UR QL STRIP.AUTO: NEGATIVE
LYMPHOCYTES # BLD AUTO: 3.65 X10(3) UL (ref 1–4)
LYMPHOCYTES NFR BLD AUTO: 35.8 %
MCH RBC QN AUTO: 28.9 PG (ref 26–34)
MCHC RBC AUTO-ENTMCNC: 31.7 G/DL (ref 31–37)
MCV RBC AUTO: 91.2 FL
MONOCYTES # BLD AUTO: 0.55 X10(3) UL (ref 0.1–1)
MONOCYTES NFR BLD AUTO: 5.4 %
NEUTROPHILS # BLD AUTO: 5.73 X10 (3) UL (ref 1.5–7.7)
NEUTROPHILS # BLD AUTO: 5.73 X10(3) UL (ref 1.5–7.7)
NEUTROPHILS NFR BLD AUTO: 56.1 %
NITRITE UR QL STRIP.AUTO: NEGATIVE
OSMOLALITY SERPL CALC.SUM OF ELEC: 298 MOSM/KG (ref 275–295)
PH UR: 6.5 [PH] (ref 5–8)
PLATELET # BLD AUTO: 362 10(3)UL (ref 150–450)
POTASSIUM SERPL-SCNC: 4.3 MMOL/L (ref 3.5–5.1)
PROT SERPL-MCNC: 7 G/DL (ref 5.7–8.2)
PROT UR-MCNC: NEGATIVE MG/DL
RBC # BLD AUTO: 4.88 X10(6)UL
SODIUM SERPL-SCNC: 144 MMOL/L (ref 136–145)
SP GR UR STRIP: 1.01 (ref 1–1.03)
TSI SER-ACNC: 0.79 MIU/ML (ref 0.55–4.78)
UROBILINOGEN UR STRIP-ACNC: NORMAL
WBC # BLD AUTO: 10.2 X10(3) UL (ref 4–11)

## 2024-07-31 PROCEDURE — 36415 COLL VENOUS BLD VENIPUNCTURE: CPT

## 2024-07-31 PROCEDURE — 82550 ASSAY OF CK (CPK): CPT

## 2024-07-31 PROCEDURE — 85025 COMPLETE CBC W/AUTO DIFF WBC: CPT

## 2024-07-31 PROCEDURE — 80053 COMPREHEN METABOLIC PANEL: CPT

## 2024-07-31 PROCEDURE — 84443 ASSAY THYROID STIM HORMONE: CPT

## 2024-07-31 PROCEDURE — 81003 URINALYSIS AUTO W/O SCOPE: CPT

## 2024-07-31 NOTE — TELEPHONE ENCOUNTER
Spoke to Jaye who stated she is certain she has another episode of thrush. She awakened to this AM mouth and throat discomfort. Patient denied white patches and she is able to drink. Patient seen Dr. Mesa yesterday but did not have these symptoms.    Disposition: Home care    Patient is requesting a prescription sent to the pharmacy to treat thrush.     Please advise.      Reason for Disposition   Mouth pain and present < 3 days    Answer Assessment - Initial Assessment Questions  1. ONSET: \"When did the mouth start hurting?\" (e.g., hours or days ago)       Today  2. SEVERITY: \"How bad is the pain?\" (Scale 1-10; mild, moderate or severe)    - MILD (1-3):  doesn't interfere with eating or normal activities    - MODERATE (4-7): interferes with eating some solids and normal activities    - SEVERE (8-10):  excruciating pain, interferes with most normal activities    - SEVERE DYSPHAGIA: can't swallow liquids, drooling      Mild  3. SORES: \"Are there any sores or ulcers in the mouth?\" If Yes, ask: \"What part of the mouth are the sores in?\"      Denied but does have irritation.  4. FEVER: \"Do you have a fever?\" If Yes, ask: \"What is your temperature, how was it measured, and when did it start?\"      Denied  5. CAUSE: \"What do you think is causing the mouth pain?\"      Thrush, patient had 3 prior episodes.  6. OTHER SYMPTOMS: \"Do you have any other symptoms?\" (e.g., difficulty breathing)      Denied    Protocols used: Mouth Pain-A-OH

## 2024-07-31 NOTE — TELEPHONE ENCOUNTER
Spoke with Jaye advised to go to [facility] for evaluation to determine if symptoms are thrush. Patient agreed to the plan and voiced understanding.

## 2024-07-31 NOTE — TELEPHONE ENCOUNTER
From: Jaye Abebe  To: Kennedy Mesa  Sent: 7/31/2024 10:12 AM CDT  Subject: Thrush    I wake up this morning with Thrush. This is the 3rd time I’ve gotten it, so I know I have it. I just saw you yesterday. Can you please put in a prescription at [facility] as soon as possible?     Thanks  Jaye

## 2024-08-01 ENCOUNTER — TELEPHONE (OUTPATIENT)
Age: 49
End: 2024-08-01

## 2024-08-01 DIAGNOSIS — J45.50 SEVERE PERSISTENT ASTHMA WITHOUT COMPLICATION (HCC): ICD-10-CM

## 2024-08-01 DIAGNOSIS — J45.30 MILD PERSISTENT ASTHMA WITHOUT COMPLICATION (HCC): ICD-10-CM

## 2024-08-01 RX ORDER — TIOTROPIUM BROMIDE 18 UG/1
1 CAPSULE ORAL; RESPIRATORY (INHALATION) DAILY
Qty: 90 CAPSULE | Refills: 3 | Status: SHIPPED | OUTPATIENT
Start: 2024-08-01

## 2024-08-01 NOTE — TELEPHONE ENCOUNTER
Gladis Cee,    I'm glad that we were able to connect today. Here are some marital counseling resources that may be a good fit. Please verify your insurance coverage with any providers that you may choose to call and schedule with directly. If you need further assistance, please give our office a call at [phone]. You may also contact the [facility] 24/7 helpline at [phone] for additional support.     Ann Mao LCSW  [facility] Office: [address], [place], [address]  [place] Phone: [phone]   [place] Office: [address] [place] [address], [address], [place], [address]  [place] Phone: [phone]     Melany Stratton, PhD  [address] [place], [address]  Phone: [phone]    Laura Vines LCSW  [facility]  [address] [place] [address], [address]  Phone: [phone]    [facility]  [address], [place], [place], [address]  Phone: [phone]    Audrey Simon PSYD   [address], [place], [address]  Phone: [phone]    Tasha Finley LCPC  [facility]   [address], [place], [place], [address]  Phone: [phone]    Selena ARMENDARIZ LCSW (she/her/hers)  Patient Care Navigator - Mental Health  [facility]/Mental Health Division  [email or web address]  Request an assessment or support »

## 2024-08-01 NOTE — TELEPHONE ENCOUNTER
Last OV:7-30-24  Last refill:5-16-24    Next Appt: With Internal Medicine (Kennedy Mesa MD)  08/19/2024 at 3:40 PM

## 2024-08-05 ENCOUNTER — PATIENT MESSAGE (OUTPATIENT)
Dept: INTERNAL MEDICINE CLINIC | Facility: CLINIC | Age: 49
End: 2024-08-05

## 2024-08-05 DIAGNOSIS — R06.83 SNORING: Primary | ICD-10-CM

## 2024-08-05 RX ORDER — ALBUTEROL SULFATE 90 UG/1
2 AEROSOL, METERED RESPIRATORY (INHALATION) EVERY 6 HOURS PRN
Qty: 25.5 EACH | Refills: 3 | OUTPATIENT
Start: 2024-08-05

## 2024-08-05 RX ORDER — ALBUTEROL SULFATE 90 UG/1
2 AEROSOL, METERED RESPIRATORY (INHALATION) EVERY 6 HOURS PRN
Qty: 3 EACH | Refills: 3 | Status: SHIPPED | OUTPATIENT
Start: 2024-08-05

## 2024-08-05 NOTE — TELEPHONE ENCOUNTER
REASON FOR REFUSAL AND ROUTE:    Patient established care with Dr. Kennedy Mesa 5/16/2024    Requested Prescriptions     Pending Prescriptions Disp Refills    ALBUTEROL 108 (90 Base) MCG/ACT Inhalation Aero Soln [Pharmacy Med Name: ALBUTEROL HFA (PROAIR) INHALER] 25.5 each 3     Sig: INHALE 2 PUFFS BY MOUTH EVERY 6 HOURS AS NEEDED FOR WHEEZE OR SHORTNESS OF BREATH

## 2024-08-05 NOTE — TELEPHONE ENCOUNTER
From: Jaye Abebe  To: Kennedy Mesa  Sent: 8/5/2024 4:39 PM CDT  Subject: Referral for Jordy Griffith,   I was not able to get in with the original provider that you had put in the referral for until January. Her assistant was the first available appointment.   Is it okay to see her?  Do I need another referral to see her?    Thank you  Jaye

## 2024-08-07 ENCOUNTER — HOSPITAL ENCOUNTER (OUTPATIENT)
Dept: CV DIAGNOSTICS | Facility: HOSPITAL | Age: 49
Discharge: HOME | End: 2024-08-07
Attending: FAMILY MEDICINE
Payer: COMMERCIAL

## 2024-08-07 DIAGNOSIS — R00.2 PALPITATIONS: ICD-10-CM

## 2024-08-07 PROCEDURE — 93243 EXT ECG>48HR<7D SCAN A/R: CPT | Performed by: FAMILY MEDICINE

## 2024-08-07 PROCEDURE — 93242 EXT ECG>48HR<7D RECORDING: CPT | Performed by: FAMILY MEDICINE

## 2024-08-12 ENCOUNTER — OFFICE VISIT (OUTPATIENT)
Dept: SURGERY | Facility: CLINIC | Age: 49
End: 2024-08-12
Payer: COMMERCIAL

## 2024-08-12 VITALS
HEIGHT: 63 IN | BODY MASS INDEX: 44.65 KG/M2 | OXYGEN SATURATION: 98 % | SYSTOLIC BLOOD PRESSURE: 120 MMHG | DIASTOLIC BLOOD PRESSURE: 78 MMHG | HEART RATE: 111 BPM | WEIGHT: 252 LBS

## 2024-08-12 DIAGNOSIS — I10 ESSENTIAL HYPERTENSION: Primary | ICD-10-CM

## 2024-08-12 DIAGNOSIS — E78.5 DYSLIPIDEMIA: ICD-10-CM

## 2024-08-12 DIAGNOSIS — R63.2 BINGE EATING: ICD-10-CM

## 2024-08-12 DIAGNOSIS — E88.819 INSULIN RESISTANCE: ICD-10-CM

## 2024-08-12 DIAGNOSIS — E66.01 MORBID OBESITY WITH BMI OF 40.0-44.9, ADULT (HCC): ICD-10-CM

## 2024-08-12 DIAGNOSIS — F39 UNSPECIFIED MOOD (AFFECTIVE) DISORDER (HCC): ICD-10-CM

## 2024-08-12 DIAGNOSIS — K76.0 FATTY LIVER: ICD-10-CM

## 2024-08-12 PROCEDURE — 3074F SYST BP LT 130 MM HG: CPT | Performed by: INTERNAL MEDICINE

## 2024-08-12 PROCEDURE — 99214 OFFICE O/P EST MOD 30 MIN: CPT | Performed by: INTERNAL MEDICINE

## 2024-08-12 PROCEDURE — 3078F DIAST BP <80 MM HG: CPT | Performed by: INTERNAL MEDICINE

## 2024-08-12 PROCEDURE — 3008F BODY MASS INDEX DOCD: CPT | Performed by: INTERNAL MEDICINE

## 2024-08-12 RX ORDER — HYDROXYZINE HYDROCHLORIDE 10 MG/1
10 TABLET, FILM COATED ORAL EVERY 8 HOURS PRN
COMMUNITY
Start: 2024-08-02 | End: 2024-08-12

## 2024-08-12 RX ORDER — HYDROCHLOROTHIAZIDE 12.5 MG/1
12.5 CAPSULE, GELATIN COATED ORAL EVERY MORNING
COMMUNITY
Start: 2024-08-01 | End: 2024-08-12

## 2024-08-12 RX ORDER — SERTRALINE HYDROCHLORIDE 100 MG/1
200 TABLET, FILM COATED ORAL DAILY
Qty: 120 TABLET | Refills: 5 | Status: SHIPPED | OUTPATIENT
Start: 2024-08-12 | End: 2024-10-11

## 2024-08-12 NOTE — PROGRESS NOTES
[facility], [facility], [place]  [address] [facility] [address] [place] [id number]  Dept: [phone]       Patient:  Jaye Abebe  :      1975  MRN:      [id number]    Chief Complaint:    Chief Complaint   Patient presents with    Follow - Up    Weight Management       SUBJECTIVE     History of Present Illness:  Jaye is being seen today for a follow-up for non surgical weight loss.     Past Medical History:   Past Medical History:    Bilateral carpal tunnel syndrome    Current moderate episode of major depressive disorder without prior episode (HCC)    Essential hypertension    Focal nodular hyperplasia of liver    Generalized anxiety disorder    Hyperlipidemia    Irritable bowel syndrome with diarrhea    PTSD (post-traumatic stress disorder)    Severe persistent asthma without complication (HCC)        Comorbidities:  Back pain-Improvement?  no, Joint pain-Improvement?  yes, GERD-Improvement?  no, Hypertension-Improvement?  yes, Hyperlipidemia-Improvement?  yes, HELGA-Improvement?  yes, Snoring-Improvement?  yes and Sleep apnea-Improvement?  yes    OBJECTIVE     Vitals: /78   Pulse 111   Ht 5' 3\" (1.6 m)   Wt 252 lb (114.3 kg)   SpO2 98%   BMI 44.64 kg/m²     Initial weight loss: +07   Total weight loss: -34   Start weight: 286    Wt Readings from Last 3 Encounters:   24 252 lb (114.3 kg)   24 255 lb 6.4 oz (115.8 kg)   24 251 lb (113.9 kg)       Patient Medications:    Current Outpatient Medications   Medication Sig Dispense Refill    albuterol 108 (90 Base) MCG/ACT Inhalation Aero Soln Inhale 2 puffs into the lungs every 6 (six) hours as needed for Wheezing or Shortness of Breath. 3 each 3    tiotropium (SPIRIVA HANDIHALER) 18 MCG Inhalation Cap INHALE 1 CAPSULE VIA HANDIHALER ONCE DAILY AT THE SAME TIME EVERY DAY 90 capsule 3    sertraline 100 MG Oral Tab Take 1 tablet (100 mg total) by mouth daily. 30 tablet 1    Nystatin  833169 UNIT/GM External Powder Apply 1 Application  topically 4 (four) times daily. Apply to affected areas 60 g 1    topiramate 25 MG Oral Tab Take 1 tablet (25 mg total) by mouth 2 (two) times daily. 180 tablet 1    lisinopril 10 MG Oral Tab Take 1 tablet (10 mg total) by mouth daily. 90 tablet 3    levonorgestrel 20 MCG/24HR Intrauterine IUD Use as directed  Mirena Lot [id number]   11-10       Allergies:  Patient has no known allergies.     Social History:    Social History     Socioeconomic History    Marital status:      Spouse name: Not on file    Number of children: Not on file    Years of education: Not on file    Highest education level: Not on file   Occupational History    Not on file   Tobacco Use    Smoking status: Every Day     Current packs/day: 0.50     Average packs/day: 0.5 packs/day for 35.6 years (17.8 ttl pk-yrs)     Types: Cigarettes     Start date: 1989    Smokeless tobacco: Never   Vaping Use    Vaping status: Never Used   Substance and Sexual Activity    Alcohol use: Yes     Comment: Occasional - once a month    Drug use: Yes     Types: Cannabis     Comment: has a medical card     Sexual activity: Yes     Birth control/protection: Mirena     Comment: 03/2018   Other Topics Concern     Service Not Asked    Blood Transfusions Not Asked    Caffeine Concern Yes     Comment: 1 cup daily soda/coffee    Occupational Exposure Not Asked    Hobby Hazards Not Asked    Sleep Concern Not Asked    Stress Concern Not Asked    Weight Concern Not Asked    Special Diet Not Asked    Back Care Not Asked    Exercise Not Asked    Bike Helmet Not Asked    Seat Belt Not Asked    Self-Exams Not Asked    Grew up on a farm Not Asked    History of tanning Not Asked    Outdoor occupation Not Asked    Pt has a pacemaker No    Pt has a defibrillator No    Breast feeding No    Reaction to local anesthetic No   Social History Narrative    Relationships:  - Danny*    Children: Darin* (adult son, history of  bipolar), Kendrick (twins - 15M), Elsie (teacher)    Pets: Dog    School: N/A    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     Origin:    Interests:      Spiritual:      Social Determinants of Health     Financial Resource Strain: Not on file   Food Insecurity: Not on file   Transportation Needs: Not on file   Physical Activity: Not on file   Stress: Not on file   Social Connections: Not on file   Housing Stability: Not on file     Surgical History:    Past Surgical History:   Procedure Laterality Date    Cholecystectomy  2002    Colonoscopy N/A 01/30/2018    Procedure: COLONOSCOPY;  Surgeon: Timbo Sue MD;  Location: [facility] ENDOSCOPY    Colonoscopy N/A 03/30/2021    Procedure: COLONOSCOPY;  Surgeon: Timbo Sue MD;  Location: [facility] ENDOSCOPY    Cyst aspiration left      Knee arthroscopy  1994     Family History:    Family History   Problem Relation Age of Onset    Depression Mother     No Known Problems Father         Not in contact    Cancer Sister 18        Hodgkin's Lymphoma    No Known Problems Sister     Breast Cancer Maternal Grandmother 50    Dementia Maternal Grandfather     No Known Problems Paternal Grandmother     Diabetes Paternal Grandfather     Colon Cancer Paternal Uncle         50s    Schizophrenia Maternal Aunt     Mental Disorder Maternal Cousin        Food Journal  Reviewed and Discussed:       Patient has a Food Journal?: yes   Patient is reading nutrition labels?  yes  Average Caloric Intake:     Average CHO Intake: 110  Is patient exercising? no  Type of exercise?     Eating Habits  Patient states the following:  Eats 2 meal(s) per day  Length of time it takes to consume a meal:  20  # of snacks per day: 1 Type of snacks:  junk  Amount of soda consumption per day:    Amount of water (in ounces) per day:  64  Drinking between meals only:  yes  Toughest challenge:  exercise    Nutritional Goals  Limit carbohydrates to 100 gms per day, Eat 100-200 calories  within 1 hour of waking  and Eat 3-4 cups of fresh fruits or vegetables daily    Behavior Modifications Reviewed and Discussed  Eat breakfast, Eat 3 meals per day, Plan meals in advance, Read nutrition labels, Drink 64 oz of water per day, Maintain a daily food journal, No drinking 30 minutes before or after meals, Utlize portion control strategies to reduce calorie intake, Identify triggers for eating and manage cues and Eat slowly and take 20 to 30 minutes to complete each meal    Exercise Goals Reviewed and Discussed    Needs to keep moving    ROS:    Constitutional: negative  Respiratory: negative  Cardiovascular: negative  Gastrointestinal: negative  Musculoskeletal:positive for arthralgias and back pain  Neurological: negative  Behavioral/Psych: positive for stress  Endocrine: negative  All other systems were reviewed and are negative    Physical Exam:   Head: Normocephalic, without obvious abnormality, atraumatic  Eyes: conjunctivae/corneas clear. PERRL, EOM's intact. Fundi benign.  Lungs: clear to auscultation bilaterally  Heart: S1, S2 normal, no murmur, click, rub or gallop, regular rate and rhythm  Abdomen:  soft, obese, non tender  Extremities: extremities normal, atraumatic, no cyanosis or edema  Pulses: 2+ and symmetric  Skin:  irritation under skin folds  Neurologic: Grossly normal    ASSESSMENT     HYPERTENSION:  The patient's blood pressure has been well controlled.  she has been checking it as instructed and has remained in relatively good control.    HYPERCHOLESTEROLEMIA:  The patient states that her cholesterol has been well controlled on her current medication.    Lab Results   Component Value Date/Time    CHOLEST 206 (H) 05/16/2024 03:15 PM     (H) 05/16/2024 03:15 PM    HDL 47 05/16/2024 03:15 PM    TRIG 249 (H) 05/16/2024 03:15 PM    VLDL 44 (H) 05/16/2024 03:15 PM       Encounter Diagnosis(ses):   Encounter Diagnoses   Name Primary?    Essential hypertension Yes    Insulin resistance      Fatty liver     Dyslipidemia     Binge eating     Morbid obesity with BMI of 40.0-44.9, adult (HCC)        PLAN     Patient is not interested in bariatric surgery. Patient desires to pursue traditional weight loss at this time.      HYPERTENSION: Blood pressure stable on the above medications. No interval change in antihypertensive medication.     DYSLIPIDEMIA: Stable on the above prescribed meal plan and medication. Liver function stable.    Lab Results   Component Value Date/Time    CHOLEST 206 (H) 05/16/2024 03:15 PM     (H) 05/16/2024 03:15 PM    HDL 47 05/16/2024 03:15 PM    TRIG 249 (H) 05/16/2024 03:15 PM    VLDL 44 (H) 05/16/2024 03:15 PM     Goals for next month:  1. Keep a food log.  2. Drink 48-64 ounces of non-caloric beverages per day. No fruit juices or regular soda.  3. Increase activity-upper body exercises, walk 10 minutes per day.  4. Increase fruit and vegetable servings to 5-6 per day.      Should come to seminar      Phentermine discontinued. Elevated HR    ekg done     Topiramate to BID    GLP not covered    Stress/night eating syndrome: 200 mg of Zoloft    Compound semaglutide is a custom-made medication that mimics the GLP-1 hormone. It is used to treat type 2 diabetes and lower the risk of heart or blood vessel disease. It works by increasing insulin release, lowering glucagon release, delaying gastric emptying and reducing appetite. Compound semaglutide is prescribed when an FDA-approved medication, dose, or dosage form is unavailable (ie. Nationwide shortage or no obesity coverage for GLP-1 meds).   Cost of these medications is  dollars monthly based on dose.      Intertrigo: keep areas under skin folds dry        Diagnoses and all orders for this visit:    Essential hypertension    Insulin resistance    Fatty liver    Dyslipidemia    Binge eating    Morbid obesity with BMI of 40.0-44.9, adult (HCC)          Paco Eason MD

## 2024-08-12 NOTE — PATIENT INSTRUCTIONS
[facility]  [address], [address]  [place] [address]    Phone  Phone: [phone]    Fax  Fax: [phone]    Hours  Pharmacy: Mon - Fri 7:30AM - 7:30PM    Call Center: Mon - Fri 7:00AM - 7:00PM

## 2024-08-13 ENCOUNTER — OFFICE VISIT (OUTPATIENT)
Facility: LOCATION | Age: 49
End: 2024-08-13
Payer: COMMERCIAL

## 2024-08-13 VITALS
HEART RATE: 100 BPM | WEIGHT: 255.38 LBS | BODY MASS INDEX: 45.25 KG/M2 | OXYGEN SATURATION: 95 % | HEIGHT: 63 IN | DIASTOLIC BLOOD PRESSURE: 90 MMHG | SYSTOLIC BLOOD PRESSURE: 130 MMHG

## 2024-08-13 DIAGNOSIS — K76.0 FATTY LIVER: ICD-10-CM

## 2024-08-13 DIAGNOSIS — E21.3 HYPERPARATHYROIDISM (HCC): ICD-10-CM

## 2024-08-13 DIAGNOSIS — E27.9 ADRENAL NODULE (HCC): ICD-10-CM

## 2024-08-13 DIAGNOSIS — E55.9 VITAMIN D DEFICIENCY: ICD-10-CM

## 2024-08-13 DIAGNOSIS — E83.52 HYPERCALCEMIA: Primary | ICD-10-CM

## 2024-08-13 DIAGNOSIS — E78.5 DYSLIPIDEMIA: ICD-10-CM

## 2024-08-13 DIAGNOSIS — D35.02 ADENOMA OF LEFT ADRENAL GLAND: ICD-10-CM

## 2024-08-13 DIAGNOSIS — E27.9 ADRENAL GLAND DISEASE (HCC): ICD-10-CM

## 2024-08-13 DIAGNOSIS — F32.1 CURRENT MODERATE EPISODE OF MAJOR DEPRESSIVE DISORDER WITHOUT PRIOR EPISODE (HCC): ICD-10-CM

## 2024-08-13 DIAGNOSIS — R79.89 INCREASED PTH LEVEL: ICD-10-CM

## 2024-08-13 DIAGNOSIS — I10 ESSENTIAL HYPERTENSION: ICD-10-CM

## 2024-08-13 DIAGNOSIS — E53.8 VITAMIN B12 DEFICIENCY: ICD-10-CM

## 2024-08-13 PROCEDURE — 3080F DIAST BP >= 90 MM HG: CPT | Performed by: INTERNAL MEDICINE

## 2024-08-13 PROCEDURE — 99205 OFFICE O/P NEW HI 60 MIN: CPT | Performed by: INTERNAL MEDICINE

## 2024-08-13 PROCEDURE — 3075F SYST BP GE 130 - 139MM HG: CPT | Performed by: INTERNAL MEDICINE

## 2024-08-13 PROCEDURE — 3008F BODY MASS INDEX DOCD: CPT | Performed by: INTERNAL MEDICINE

## 2024-08-13 NOTE — PATIENT INSTRUCTIONS
Fasting AM labs - water is ok   Salivary cortisol x3 at bedtime   24 hr urine collection    SALIVARY CORTISOL    Please  the saliva collection kit from the lab. The instructions for the saliva collection will be provided to you. We need two separate salivary cortisol collections. You can do them 3 days apart.     - Do not brush teeth for 1 hour before collecting specimen.   -Do not eat or drink for 30 minutes prior to specimen collection.   -Do not drink alcohol or smoke 12 hours before  -Collect specimen at bedtime and record collection time.   -Do not apply any type of oral steroid creams as that can interfere with the test results.   -Using the [place]ivette collection tube, remove the cotton roll & place under the tongue. Chew lightly until cotton roll is saturated with saliva. Return cotton roll to collection tube & cap. Label tube with name & collection time/date. Refrigerate tube until it can be mailed or taken to the lab    ------  How to Collect the 24-hour Urine Specimen  Decide on a day to do the test.  On the day of the test, patient empty bladder (urinate or pee) in the toilet right after waking up. Flush the urine down the toilet.  The test begins now with the bladder empty. Write this date and the start time on the storage container’s label.  For the next 24 hours, patient will need to pee into a collection container every time they go to the bathroom. Females can use a toilet hat. Males can use a plastic urinal or pee right into the large storage container. If you do not have a toilet hat or urinal at home, you may use some other clean plastic container- or get them from labs   Before using the plastic container for the first time, wash it with dish soap and then rinse at least 10 times with tap water. Allow it to air dry completely.  Do not let feces (poop) mix with the urine or else the test will need to be restarted.  Pour the urine into the large storage container and close the lid tightly. Be  very careful not to spill any urine.  If using a collection container, rinse it with water only. Put it back by the toilet to remind you to use it the next time. Allow it to air dry completely.  Put the large storage container in the refrigerator ( or in put ice around the container and place in larger container). The urine must be kept cool at all times. If you do not have space in the refrigerator, you can store it in a cooler on top of Add more ice as needed to keep the urine cold.  Each time patient pees during the day and night, follow steps to collect urine.  The next day (close to the same time that you started on the first day), patient needs to pee into the collection container one last time. Add it to the large storage container. This ends the 24-hour collection.  Write the date and time of this last urine collection on the label.  Attach a list of all medicines, including over-the-counter medicines, vitamins and herbal remedies, patient took during the 24-hour urine collection.    Take the urine to a Laboratory (Lab) Service Center as soon as possible, within 24 hours after ending the collection. Keep the urine cool.  Make sure the urine does not freeze for these tests: amylase, arylsulfatase, immunoelectrophoresis, micro-albumin, pregnanetriol, protein or uric acid.  You will need to start the test over if any of the urine spilled, you forgot to save some or it has poop in it. If you must restart the test, it is okay to use the same collection and storage containers. Pour out the urine, clean the containers well and allow them to air dry. Then follow

## 2024-08-13 NOTE — PROGRESS NOTES
New Patient Evaluation - History and Physical    CONSULT - Reason for Visit:      Requesting Physician:   Jocelyn Mesa MD    CHIEF COMPLAINT:    Chief Complaint   Patient presents with    Consult     For abnormal thyroid labs.  Was admitted to hospital in [place] for sepsis. Was told that she has a nodule on kidney that may be causing abnormal labs.  Was referred by Moshe Benavides MD        HISTORY OF PRESENT ILLNESS:   Jaye Abebe is a 49 year old female who presents with hypercalcemia and left adrenal adenoma.     Was seen with her mother   She had high HR in [place] . called oncology and was referred to ER. She had urosepsis.   She was sick for 1.5 yr. Sweating, weak all the time, not back to normal.   She feels palpitation, headache, sweating, palpitation, getting pale  no chest tightness   Has oily skin and stretch marks   No acne  Wt Readings from Last 6 Encounters:   08/13/24 255 lb 6.4 oz (115.8 kg)   08/12/24 252 lb (114.3 kg)   07/30/24 255 lb 6.4 oz (115.8 kg)   06/26/24 251 lb (113.9 kg)   05/20/24 243 lb (110.2 kg)   05/16/24 243 lb (110.2 kg)     Lab Results   Component Value Date    A1C 5.6 05/16/2024    A1C 5.4 06/20/2023    A1C 5.6 07/02/2021      Saw oncology for leukemia concern and she was discharged.   No LMP recorded. (Menstrual status: IUD - Intrauterine Device).  No kidney stones  Drinks more water and urinates more   Denied Polyuria, Polydipsia, Nephrolithiasis, nephrocalcinosis   Denied Distal renal tubular acidosis, Acute and chronic renal insufficiency   Gastrointestinal: Denied Anorexia, nausea, vomiting, Bowel hypomotility and constipation, Pancreatitis, Peptic ulcer disease,   Musculoskeletal: Denied Muscle weakness, Bone pain,   Neurologic: Denied decreased concentration, Confusion, Fatigue, Stupor, coma,          ASSESSMENT AND PLAN:  Jaye Abebe is a 49 year old female who presents with PTH mediated hypercalcemia, left adrenal adenoma,  Obese, HTN, Low vit D and low B12,  Smoker and HR is high. We discussed smoking cessation today.  Clinically has nonspecific symptoms. Tachycardia and palpitation can be related to functioning adrenal adenoma/pheo vs hypovolemia vs smoking   No spontaneous hypokalemia      MRI in 2023 showed stable left adrenal adenoma. Was seen in 2018 on CT scan.    She understands the need for  work up      Plan  Fasting AM labs - water is ok   Salivary cortisol x3 at bedtime   24 hr urine collection    SALIVARY CORTISOL    Please  the saliva collection kit from the lab. The instructions for the saliva collection will be provided to you. We need two separate salivary cortisol collections. You can do them 3 days apart.     - Do not brush teeth for 1 hour before collecting specimen.   -Do not eat or drink for 30 minutes prior to specimen collection.   -Do not drink alcohol or smoke 12 hours before  -Collect specimen at bedtime and record collection time.   -Do not apply any type of oral steroid creams as that can interfere with the test results.   -Using the Salivette collection tube, remove the cotton roll & place under the tongue. Chew lightly until cotton roll is saturated with saliva. Return cotton roll to collection tube & cap. Label tube with name & collection time/date. Refrigerate tube until it can be mailed or taken to the lab    ------  How to Collect the 24-hour Urine Specimen  Decide on a day to do the test.  On the day of the test, patient empty bladder (urinate or pee) in the toilet right after waking up. Flush the urine down the toilet.  The test begins now with the bladder empty. Write this date and the start time on the storage container’s label.  For the next 24 hours, patient will need to pee into a collection container every time they go to the bathroom. Females can use a toilet hat. Males can use a plastic urinal or pee right into the large storage container. If you do not have a toilet hat or urinal at home, you may use some other clean  plastic container- or get them from labs   Before using the plastic container for the first time, wash it with dish soap and then rinse at least 10 times with tap water. Allow it to air dry completely.  Do not let feces (poop) mix with the urine or else the test will need to be restarted.  Pour the urine into the large storage container and close the lid tightly. Be very careful not to spill any urine.  If using a collection container, rinse it with water only. Put it back by the toilet to remind you to use it the next time. Allow it to air dry completely.  Put the large storage container in the refrigerator ( or in put ice around the container and place in larger container). The urine must be kept cool at all times. If you do not have space in the refrigerator, you can store it in a cooler on top of Add more ice as needed to keep the urine cold.  Each time patient pees during the day and night, follow steps to collect urine.  The next day (close to the same time that you started on the first day), patient needs to pee into the collection container one last time. Add it to the large storage container. This ends the 24-hour collection.  Write the date and time of this last urine collection on the label.  Attach a list of all medicines, including over-the-counter medicines, vitamins and herbal remedies, patient took during the 24-hour urine collection.    Take the urine to a Laboratory (Lab) Service Center as soon as possible, within 24 hours after ending the collection. Keep the urine cool.  Make sure the urine does not freeze for these tests: amylase, arylsulfatase, immunoelectrophoresis, micro-albumin, pregnanetriol, protein or uric acid.  You will need to start the test over if any of the urine spilled, you forgot to save some or it has poop in it. If you must restart the test, it is okay to use the same collection and storage containers. Pour out the urine, clean the containers well and allow them to air dry.  Then follow               PAST MEDICAL HISTORY:   Past Medical History:    Bilateral carpal tunnel syndrome    Current moderate episode of major depressive disorder without prior episode (HCC)    Essential hypertension    Focal nodular hyperplasia of liver    Generalized anxiety disorder    Hyperlipidemia    Irritable bowel syndrome with diarrhea    PTSD (post-traumatic stress disorder)    Severe persistent asthma without complication (HCC)       PAST SURGICAL HISTORY:   Past Surgical History:   Procedure Laterality Date    Cholecystectomy  2002    Colonoscopy N/A 01/30/2018    Procedure: COLONOSCOPY;  Surgeon: Timbo Sue MD;  Location: [facility] ENDOSCOPY    Colonoscopy N/A 03/30/2021    Procedure: COLONOSCOPY;  Surgeon: Timbo Sue MD;  Location: [facility] ENDOSCOPY    Cyst aspiration left      Knee arthroscopy  1994       CURRENT MEDICATIONS:     sertraline 100 MG Oral Tab Take 2 tablets (200 mg total) by mouth daily. 120 tablet 5    albuterol 108 (90 Base) MCG/ACT Inhalation Aero Soln Inhale 2 puffs into the lungs every 6 (six) hours as needed for Wheezing or Shortness of Breath. 3 each 3    tiotropium (SPIRIVA HANDIHALER) 18 MCG Inhalation Cap INHALE 1 CAPSULE VIA HANDIHALER ONCE DAILY AT THE SAME TIME EVERY DAY 90 capsule 3    Nystatin [id number] UNIT/GM External Powder Apply 1 Application  topically 4 (four) times daily. Apply to affected areas 60 g 1    topiramate 25 MG Oral Tab Take 1 tablet (25 mg total) by mouth 2 (two) times daily. 180 tablet 1    lisinopril 10 MG Oral Tab Take 1 tablet (10 mg total) by mouth daily. 90 tablet 3    levonorgestrel 20 MCG/24HR Intrauterine IUD Use as directed  Mirena Lot [id number]   11-10         ALLERGIES:  No Known Allergies    SOCIAL HISTORY:    Social History     Socioeconomic History    Marital status:    Tobacco Use    Smoking status: Every Day     Current packs/day: 0.50     Average packs/day: 0.5 packs/day for 35.6 years (17.8 ttl pk-yrs)     Types:  Cigarettes     Start date: 1989    Smokeless tobacco: Never   Vaping Use    Vaping status: Never Used   Substance and Sexual Activity    Alcohol use: Yes     Comment: Occasional - once a month    Drug use: Yes     Types: Cannabis     Comment: has a medical card     Sexual activity: Yes     Birth control/protection: Mirena     Comment: 03/2018   Other Topics Concern    Caffeine Concern Yes     Comment: 1 cup daily soda/coffee    Pt has a pacemaker No    Pt has a defibrillator No    Breast feeding No    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  Smoking yes, 1/3 ppd   Marijuana yes   Etoh social  Drugs no  FAMILY HISTORY:   Family History   Problem Relation Age of Onset    Depression Mother     No Known Problems Father         Not in contact    Cancer Sister 18        Hodgkin's Lymphoma    No Known Problems Sister     Breast Cancer Maternal Grandmother 50    Dementia Maternal Grandfather     No Known Problems Paternal Grandmother     Diabetes Paternal Grandfather     Colon Cancer Paternal Uncle         50s    Schizophrenia Maternal Aunt     Mental Disorder Maternal Cousin         REVIEW OF SYSTEMS:  All negative other than HPI    PHYSICAL EXAM:   Height: 5' 3\" (160 cm) (08/13 1544)  Weight: 255 lb 6.4 oz (115.8 kg) (08/13 1544)  BSA (Calculated - sq m): 2.15 sq meters (08/13 1544)  Pulse: 100 (08/13 1544)  BP: 130/90 (08/13 1544)  Temp: --  Do Not Use - Resp Rate: --  SpO2: 95 % (08/13 1544)    Body mass index is 45.24 kg/m².  No striae   No CVA   No spine tenderness   CONSTITUTIONAL:  Awake and alert. Age appropriate, good hygiene not in acute distress. Well-nourished and well developed. no acute distress   PSYCH:   Orientated to time, place, person & situation, Normal mood and affect, memory intact, normal insight and judgment, cooperative  Neuro: speech is clear. Awake, alert, no aphasia, no facial asymmetry, no nuchal rigidity  EYES:  No proptosis, no ptosis, conjunctiva normal  ENT:  Normocephalic,  atraumatic  Eye: EOMI, normal lids, no discharge, no conjunctival erythema. No exophthalmos/proptosis, Ptosis negative   No rhinorrhea, moist oral mucosa  Neck: full range of motion  Neck/Thyroid: neck inspection: normal, No scar, No goiter   LUNGS:  No acute respiratory distress, non-labored respiration. Speaking full sentences  CARDIOVASCULAR:  regular rate   ABDOMEN:  No abdominal pain.   SKIN:  no bruising or bleeding, no rashes and no lesions, Skin is dry, no obvious rashes or lesions  EXTREMITIES: no gross abnormality   MSK: Moves extremities spontaneously. full range of motion in all major joints      DATA:     Pertinent data reviewed   CT 2/2018 ADRENALS:   There is a 1.5 cm low-attenuation (approximately 4 Hounsfield units) left adrenal nodule. imaging characteristics compatible with a benign adenoma   MRI 3/2023 Stable left adrenal adenoma.    Latest Reference Range & Units 06/20/23 07:29 07/17/23 07:34 05/16/24 15:15 06/06/24 08:13 07/31/24 14:30   CALCIUM 8.7 - 10.4 mg/dL 10.5 (H) 10.4 (H) 11.2 (H) 11.4 (H) 10.6 (H)      Latest Reference Range & Units 05/16/24 15:15 06/06/24 08:13 07/31/24 14:30   T4,Free (Direct) 0.8 - 1.7 ng/dL 1.4     TSH 0.550 - 4.780 mIU/mL 0.525 (L)  0.785   T3 FREE 2.40 - 4.20 pg/mL 2.99     PTH INTACT 18.5 - 88.0 pg/mL  108.1 (H)       Latest Reference Range & Units 06/20/23 07:29   Alkaline Phosphatase, Bone-Specific ug/L 24.5        No results for input(s): \"TSH\", \"T4F\", \"T3F\", \"THYP\" in the last 72 hours.  No results found.    Orders Placed This Encounter   Procedures    Aldosterone, Serum    Renin, Plasma    Metanephrines, Plasma Free    Cortisol    ACTH, Plasma    Dehydroepiandrosterone Sulfate    Salivary Cortisol, MS ([facility])    Salivary Cortisol, MS ([facility])    Salivary Cortisol, MS ([facility])    Creatinine and Cortisol, Urine    PTH, Intact    Comp Metabolic Panel (14)    Magnesium    Phosphorus    Vitamin D, 1,25 Dihydroxy    CORTISOL -  AM    Calcium, 24Hr Urine    Testosterone,Free And Total    Creatinine and Catecholamines, Fractionated, Urine    Vitamin D, 25-Hydroxy [Q]     Orders Placed This Encounter    Aldosterone, Serum     Standing Status:   Future     Standing Expiration Date:   8/13/2025     Order Specific Question:   Release to patient     Answer:   Immediate    Renin, Plasma     Standing Status:   Future     Standing Expiration Date:   8/13/2025     Order Specific Question:   Release to patient     Answer:   Immediate    Metanephrines, Plasma Free    Cortisol     Standing Status:   Future     Standing Expiration Date:   8/13/2025     Order Specific Question:   Release to patient     Answer:   Immediate    ACTH, Plasma     Standing Status:   Future     Standing Expiration Date:   8/13/2025     Order Specific Question:   Release to patient     Answer:   Immediate    Dehydroepiandrosterone Sulfate     Order Specific Question:   Release to patient     Answer:   Immediate    Salivary Cortisol, MS ([facility])     Standing Status:   Future     Standing Expiration Date:   8/13/2025     Order Specific Question:   Release to patient     Answer:   Immediate    Salivary Cortisol, MS ([facility])     Standing Status:   Future     Standing Expiration Date:   8/13/2025     Order Specific Question:   Release to patient     Answer:   Immediate    Salivary Cortisol, MS ([facility])     Standing Status:   Future     Standing Expiration Date:   8/13/2025     Order Specific Question:   Release to patient     Answer:   Immediate    Creatinine and Cortisol, Urine     Standing Status:   Future     Standing Expiration Date:   8/13/2025     Order Specific Question:   Release to patient     Answer:   Immediate    PTH, Intact     Standing Status:   Future     Standing Expiration Date:   8/13/2025    Comp Metabolic Panel (14)     Standing Status:   Future     Standing Expiration Date:   8/13/2025     Order Specific Question:   Release to  patient     Answer:   Immediate    Magnesium     Standing Status:   Future     Standing Expiration Date:   8/13/2025     Order Specific Question:   Release to patient     Answer:   Immediate    Phosphorus     Standing Status:   Future     Standing Expiration Date:   8/13/2025     Order Specific Question:   Release to patient     Answer:   Immediate    Vitamin D, 1,25 Dihydroxy     Standing Status:   Future     Standing Expiration Date:   8/13/2025     Order Specific Question:   Release to patient     Answer:   Immediate    CORTISOL - AM     Standing Status:   Future     Standing Expiration Date:   8/13/2025     Order Specific Question:   Release to patient     Answer:   Immediate    Calcium, 24Hr Urine     Standing Status:   Future     Standing Expiration Date:   8/13/2025     Order Specific Question:   Release to patient     Answer:   Immediate    Testosterone,Free And Total     Standing Status:   Future     Standing Expiration Date:   8/13/2025     Order Specific Question:   Release to patient     Answer:   Immediate    Creatinine and Catecholamines, Fractionated, Urine     Standing Status:   Future     Standing Expiration Date:   8/13/2025     Order Specific Question:   Release to patient     Answer:   Immediate    Vitamin D, 25-Hydroxy [Q]     Standing Status:   Future     Standing Expiration Date:   8/13/2025     Order Specific Question:   Release to patient     Answer:   Immediate          This is a specialized patient consultation in endocrinology and required comprehensive review of prior records, as well as current evaluation, with time required for consideration of complex endocrine issues and consultation. For this visit, I personally interviewed the patient, and family member if accompanied, performed the pertinent parts of the history and physical examination. ROS included screening for appropriate endocrine conditions.   Today's diagnosis and plan were reviewed in detail with the patient who states  understanding and agrees with plan. I discussed with the patient possible diagnosis, differential diagnosis, need for work up, treatment options, alternatives and side effects.     Please see note for details about time spent which includes:   · pre-visit preparation  · reviewing records  · face to face time with the patient   · timely documentation of the encounter  · ordering medications/tests  · communication with care team  · care coordination    I appreciate the opportunity to be part of your patient's medical care and will keep you, as the referring and primary physicians, informed about the care of your patient. Please feel free to contact me should you have any questions.    The 21st Century Cures Act makes medical notes like these available to patients in the interest of transparency. Please be advised this is a medical document. Medical documents are intended to carry relevant information, facts as evident, and the clinical opinion of the practitioner. The medical note is intended as peer to peer communication and may appear blunt or direct. It is written in medical language and may contain abbreviations or verbiage that are unfamiliar.   Adonay Zaman MD

## 2024-08-17 ENCOUNTER — LAB ENCOUNTER (OUTPATIENT)
Dept: LAB | Age: 49
End: 2024-08-17
Attending: INTERNAL MEDICINE
Payer: COMMERCIAL

## 2024-08-17 DIAGNOSIS — E27.9 ADRENAL GLAND DISEASE (HCC): ICD-10-CM

## 2024-08-17 DIAGNOSIS — K76.0 FATTY LIVER: ICD-10-CM

## 2024-08-17 DIAGNOSIS — E83.52 HYPERCALCEMIA: ICD-10-CM

## 2024-08-17 DIAGNOSIS — E78.5 DYSLIPIDEMIA: ICD-10-CM

## 2024-08-17 DIAGNOSIS — E21.3 HYPERPARATHYROIDISM (HCC): ICD-10-CM

## 2024-08-17 DIAGNOSIS — E55.9 VITAMIN D DEFICIENCY: ICD-10-CM

## 2024-08-17 DIAGNOSIS — I10 ESSENTIAL HYPERTENSION: ICD-10-CM

## 2024-08-17 DIAGNOSIS — F32.1 CURRENT MODERATE EPISODE OF MAJOR DEPRESSIVE DISORDER WITHOUT PRIOR EPISODE (HCC): ICD-10-CM

## 2024-08-17 DIAGNOSIS — R79.89 INCREASED PTH LEVEL: ICD-10-CM

## 2024-08-17 DIAGNOSIS — E53.8 VITAMIN B12 DEFICIENCY: ICD-10-CM

## 2024-08-17 DIAGNOSIS — E27.9 ADRENAL NODULE (HCC): ICD-10-CM

## 2024-08-17 DIAGNOSIS — D35.02 ADENOMA OF LEFT ADRENAL GLAND: ICD-10-CM

## 2024-08-17 LAB
ALBUMIN SERPL-MCNC: 4.6 G/DL (ref 3.2–4.8)
ALBUMIN/GLOB SERPL: 1.8 {RATIO} (ref 1–2)
ALP LIVER SERPL-CCNC: 167 U/L
ALT SERPL-CCNC: 29 U/L
ANION GAP SERPL CALC-SCNC: 4 MMOL/L (ref 0–18)
AST SERPL-CCNC: 26 U/L (ref ?–34)
BILIRUB SERPL-MCNC: 0.6 MG/DL (ref 0.3–1.2)
BUN BLD-MCNC: 18 MG/DL (ref 9–23)
BUN/CREAT SERPL: 22 (ref 10–20)
CALCIUM BLD-MCNC: 10.6 MG/DL (ref 8.7–10.4)
CHLORIDE SERPL-SCNC: 112 MMOL/L (ref 98–112)
CO2 SERPL-SCNC: 23 MMOL/L (ref 21–32)
CORTIS SERPL-MCNC: 20.4 UG/DL
CORTIS SERPL-MCNC: 20.5 UG/DL
CREAT BLD-MCNC: 0.82 MG/DL
DHEA-S SERPL-MCNC: 100.3 UG/DL
EGFRCR SERPLBLD CKD-EPI 2021: 88 ML/MIN/1.73M2 (ref 60–?)
FASTING STATUS PATIENT QL REPORTED: YES
GLOBULIN PLAS-MCNC: 2.6 G/DL (ref 2–3.5)
GLUCOSE BLD-MCNC: 103 MG/DL (ref 70–99)
MAGNESIUM SERPL-MCNC: 2.1 MG/DL (ref 1.6–2.6)
OSMOLALITY SERPL CALC.SUM OF ELEC: 290 MOSM/KG (ref 275–295)
PHOSPHATE SERPL-MCNC: 2.9 MG/DL (ref 2.4–5.1)
POTASSIUM SERPL-SCNC: 4.4 MMOL/L (ref 3.5–5.1)
PROT SERPL-MCNC: 7.2 G/DL (ref 5.7–8.2)
PTH-INTACT SERPL-MCNC: 88 PG/ML (ref 18.5–88)
SODIUM SERPL-SCNC: 139 MMOL/L (ref 136–145)
VIT D+METAB SERPL-MCNC: 27.7 NG/ML (ref 30–100)

## 2024-08-17 PROCEDURE — 83735 ASSAY OF MAGNESIUM: CPT

## 2024-08-17 PROCEDURE — 82088 ASSAY OF ALDOSTERONE: CPT

## 2024-08-17 PROCEDURE — 36415 COLL VENOUS BLD VENIPUNCTURE: CPT

## 2024-08-17 PROCEDURE — 82533 TOTAL CORTISOL: CPT

## 2024-08-17 PROCEDURE — 84100 ASSAY OF PHOSPHORUS: CPT

## 2024-08-17 PROCEDURE — 83970 ASSAY OF PARATHORMONE: CPT

## 2024-08-17 PROCEDURE — 84410 TESTOSTERONE BIOAVAILABLE: CPT

## 2024-08-17 PROCEDURE — 82024 ASSAY OF ACTH: CPT

## 2024-08-17 PROCEDURE — 84244 ASSAY OF RENIN: CPT

## 2024-08-17 PROCEDURE — 82627 DEHYDROEPIANDROSTERONE: CPT | Performed by: INTERNAL MEDICINE

## 2024-08-17 PROCEDURE — 80053 COMPREHEN METABOLIC PANEL: CPT

## 2024-08-17 PROCEDURE — 82652 VIT D 1 25-DIHYDROXY: CPT

## 2024-08-17 PROCEDURE — 82306 VITAMIN D 25 HYDROXY: CPT

## 2024-08-17 PROCEDURE — 83835 ASSAY OF METANEPHRINES: CPT | Performed by: INTERNAL MEDICINE

## 2024-08-18 ENCOUNTER — APPOINTMENT (OUTPATIENT)
Dept: LAB | Facility: HOSPITAL | Age: 49
End: 2024-08-18
Attending: INTERNAL MEDICINE
Payer: COMMERCIAL

## 2024-08-18 PROCEDURE — 82533 TOTAL CORTISOL: CPT

## 2024-08-19 ENCOUNTER — LAB ENCOUNTER (OUTPATIENT)
Dept: LAB | Age: 49
End: 2024-08-19
Attending: INTERNAL MEDICINE
Payer: COMMERCIAL

## 2024-08-19 ENCOUNTER — OFFICE VISIT (OUTPATIENT)
Dept: INTERNAL MEDICINE CLINIC | Facility: CLINIC | Age: 49
End: 2024-08-19
Payer: COMMERCIAL

## 2024-08-19 VITALS
DIASTOLIC BLOOD PRESSURE: 84 MMHG | HEART RATE: 78 BPM | SYSTOLIC BLOOD PRESSURE: 126 MMHG | HEIGHT: 63 IN | BODY MASS INDEX: 45.18 KG/M2 | WEIGHT: 255 LBS | OXYGEN SATURATION: 98 % | TEMPERATURE: 98 F

## 2024-08-19 DIAGNOSIS — E27.9 ADRENAL GLAND DISEASE (HCC): ICD-10-CM

## 2024-08-19 DIAGNOSIS — D35.02 ADENOMA OF LEFT ADRENAL GLAND: ICD-10-CM

## 2024-08-19 DIAGNOSIS — E05.90 SUBCLINICAL HYPERTHYROIDISM: ICD-10-CM

## 2024-08-19 DIAGNOSIS — R65.10 SIRS (SYSTEMIC INFLAMMATORY RESPONSE SYNDROME) (HCC): ICD-10-CM

## 2024-08-19 DIAGNOSIS — M79.672 LEFT FOOT PAIN: ICD-10-CM

## 2024-08-19 DIAGNOSIS — E78.5 DYSLIPIDEMIA: ICD-10-CM

## 2024-08-19 DIAGNOSIS — R79.89 INCREASED PTH LEVEL: ICD-10-CM

## 2024-08-19 DIAGNOSIS — E27.9 ADRENAL NODULE (HCC): ICD-10-CM

## 2024-08-19 DIAGNOSIS — E21.3 HYPERPARATHYROIDISM (HCC): ICD-10-CM

## 2024-08-19 DIAGNOSIS — K76.0 FATTY LIVER: ICD-10-CM

## 2024-08-19 DIAGNOSIS — F41.1 GENERALIZED ANXIETY DISORDER: ICD-10-CM

## 2024-08-19 DIAGNOSIS — E55.9 VITAMIN D DEFICIENCY: ICD-10-CM

## 2024-08-19 DIAGNOSIS — E53.8 VITAMIN B12 DEFICIENCY: ICD-10-CM

## 2024-08-19 DIAGNOSIS — R00.2 PALPITATIONS: ICD-10-CM

## 2024-08-19 DIAGNOSIS — F43.10 PTSD (POST-TRAUMATIC STRESS DISORDER): ICD-10-CM

## 2024-08-19 DIAGNOSIS — F32.1 CURRENT MODERATE EPISODE OF MAJOR DEPRESSIVE DISORDER WITHOUT PRIOR EPISODE (HCC): ICD-10-CM

## 2024-08-19 DIAGNOSIS — J44.9 CHRONIC OBSTRUCTIVE PULMONARY DISEASE, UNSPECIFIED COPD TYPE (HCC): ICD-10-CM

## 2024-08-19 DIAGNOSIS — D58.2 ELEVATED HEMOGLOBIN (HCC): ICD-10-CM

## 2024-08-19 DIAGNOSIS — I10 ESSENTIAL HYPERTENSION: Primary | ICD-10-CM

## 2024-08-19 DIAGNOSIS — I10 ESSENTIAL HYPERTENSION: ICD-10-CM

## 2024-08-19 DIAGNOSIS — E83.52 HYPERCALCEMIA: ICD-10-CM

## 2024-08-19 PROBLEM — E88.819 INSULIN RESISTANCE: Status: RESOLVED | Noted: 2023-09-07 | Resolved: 2024-08-19

## 2024-08-19 LAB
CALCIUM 24H UR-SRATE: 315 MG/24HR (ref 100–300)
SPECIMEN VOL UR: 1750 ML

## 2024-08-19 PROCEDURE — 82384 ASSAY THREE CATECHOLAMINES: CPT

## 2024-08-19 PROCEDURE — 3079F DIAST BP 80-89 MM HG: CPT | Performed by: FAMILY MEDICINE

## 2024-08-19 PROCEDURE — 99214 OFFICE O/P EST MOD 30 MIN: CPT | Performed by: FAMILY MEDICINE

## 2024-08-19 PROCEDURE — 82570 ASSAY OF URINE CREATININE: CPT

## 2024-08-19 PROCEDURE — 3008F BODY MASS INDEX DOCD: CPT | Performed by: FAMILY MEDICINE

## 2024-08-19 PROCEDURE — G2211 COMPLEX E/M VISIT ADD ON: HCPCS | Performed by: FAMILY MEDICINE

## 2024-08-19 PROCEDURE — 82340 ASSAY OF CALCIUM IN URINE: CPT

## 2024-08-19 PROCEDURE — 3074F SYST BP LT 130 MM HG: CPT | Performed by: FAMILY MEDICINE

## 2024-08-19 RX ORDER — FLUTICASONE PROPIONATE AND SALMETEROL 250; 50 UG/1; UG/1
1 POWDER RESPIRATORY (INHALATION) 2 TIMES DAILY
COMMUNITY

## 2024-08-19 NOTE — ASSESSMENT & PLAN NOTE
Patient with a history of elevated white blood cell as well as hemoglobin count.  Recently improved.   Would also benefit from smoking cessation.  Advise staying well-hydrated

## 2024-08-19 NOTE — ASSESSMENT & PLAN NOTE
Patient with intermittent palpitations  States that she had a normal EKG during recent hospitalization  Holter without sustained ventricular arrhythmia.  She does have intermittent sinus tachycardia and supraventricular tachycardia seen on Holter  Overall feeling well without any issues  If worsening, consider using beta-blocker  Follow-up as needed.

## 2024-08-19 NOTE — ASSESSMENT & PLAN NOTE
Patient with a history of COPD  Currently on fluticasone-salmeterol 25-50 mcg daily - advised to take more regularly  Occasionally takes tiotropium 18 mcg daily   Using albuterol as needed..  Smoking cessation advised
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
## 2024-08-19 NOTE — ASSESSMENT & PLAN NOTE
Patient with left fifth digit discomfort.  Pain is gradually improving.  She is already buddy taped and was wearing a boot for several weeks.  Can hold off on getting x-ray at this time as it would unlikely alter management.  Can take Tylenol as needed for pain.  Follow-up as needed.

## 2024-08-19 NOTE — PATIENT INSTRUCTIONS
PATIENT INSTRUCTIONS    Thank you for seeing me today, it was a pleasure taking care of you.  Please check out at the  and schedule a follow up appointment.  Return in about 1 year (around 8/19/2025) for physical.  Please remember that the obed period for all appointments is 5 minutes. This is to help maximize the amount of time that we can spend together at our visits.    Pulmonology - sleep evaluation  See psychiatry  Please monitor for a call from [facility]. Someone will reach out to you to speak with you.  If you do not hear a response from [facility] in 1 week, please call them at [phone].   Continue to follow with endocrinology    Tyler,  Dr. Mesa

## 2024-08-20 LAB — ACTH: 23 PG/ML

## 2024-08-21 ENCOUNTER — APPOINTMENT (OUTPATIENT)
Dept: LAB | Facility: HOSPITAL | Age: 49
End: 2024-08-21
Attending: INTERNAL MEDICINE
Payer: COMMERCIAL

## 2024-08-21 LAB — VIT D 1,25 DI-OH: 69.1 PG/ML

## 2024-08-21 PROCEDURE — 82533 TOTAL CORTISOL: CPT

## 2024-08-22 LAB
METANEPHRINE: 37.9 PG/ML
NORMETANEPHRINE: 100 PG/ML

## 2024-08-23 LAB — ALDOSTERONE: 3.1 NG/DL

## 2024-08-24 LAB
SEX HORM BIND GLOB: 36.7 NMOL/L
TESTOST % FREE+WEAK BND: 13 %
TESTOST FREE+WEAK BND: 3.5 NG/DL
TESTOSTERONE TOT /MS: 26.7 NG/DL

## 2024-08-25 ENCOUNTER — APPOINTMENT (OUTPATIENT)
Dept: LAB | Facility: HOSPITAL | Age: 49
End: 2024-08-25
Attending: INTERNAL MEDICINE
Payer: COMMERCIAL

## 2024-08-25 PROCEDURE — 82533 TOTAL CORTISOL: CPT

## 2024-08-26 ENCOUNTER — LAB ENCOUNTER (OUTPATIENT)
Dept: LAB | Age: 49
End: 2024-08-26
Attending: INTERNAL MEDICINE
Payer: COMMERCIAL

## 2024-08-26 DIAGNOSIS — R79.89 INCREASED PTH LEVEL: ICD-10-CM

## 2024-08-26 DIAGNOSIS — K76.0 FATTY LIVER: ICD-10-CM

## 2024-08-26 DIAGNOSIS — E27.9 ADRENAL GLAND DISEASE (HCC): ICD-10-CM

## 2024-08-26 DIAGNOSIS — F32.1 CURRENT MODERATE EPISODE OF MAJOR DEPRESSIVE DISORDER WITHOUT PRIOR EPISODE (HCC): ICD-10-CM

## 2024-08-26 DIAGNOSIS — E53.8 VITAMIN B12 DEFICIENCY: ICD-10-CM

## 2024-08-26 DIAGNOSIS — E27.9 ADRENAL NODULE (HCC): ICD-10-CM

## 2024-08-26 DIAGNOSIS — I10 ESSENTIAL HYPERTENSION: ICD-10-CM

## 2024-08-26 DIAGNOSIS — E21.3 HYPERPARATHYROIDISM (HCC): ICD-10-CM

## 2024-08-26 DIAGNOSIS — D35.02 ADENOMA OF LEFT ADRENAL GLAND: ICD-10-CM

## 2024-08-26 DIAGNOSIS — E78.5 DYSLIPIDEMIA: ICD-10-CM

## 2024-08-26 DIAGNOSIS — E83.52 HYPERCALCEMIA: ICD-10-CM

## 2024-08-26 DIAGNOSIS — E55.9 VITAMIN D DEFICIENCY: ICD-10-CM

## 2024-08-27 LAB
CREAT 24H UR: 1463 MG/24 HR
CREAT UR: 83.6 MG/DL
DOPAMINE 24H UR: 47 UG/24 HR
DOPAMINE UR: 27 UG/L
EPINEPH 24H UR: <5 UG/24 HR
EPINEPH UR: <3 UG/L
NOREPINEPH 24H UR: 46 UG/24 HR
NOREPINEPH UR: 26 UG/L

## 2024-08-29 LAB — RENIN ACTIVITY: 8.63 NG/ML/HR

## 2024-09-01 ENCOUNTER — PATIENT MESSAGE (OUTPATIENT)
Facility: LOCATION | Age: 49
End: 2024-09-01

## 2024-09-03 LAB
SALIVARY CORTISOL MS: 0.07 UG/DL
SALIVARY CORTISOL MS: 0.15 UG/DL

## 2024-09-03 NOTE — TELEPHONE ENCOUNTER
Please ask the pt to follow up withPCP for her symptoms.   Will discuss more next visit   Thanks

## 2024-09-04 ENCOUNTER — TELEPHONE (OUTPATIENT)
Dept: OBGYN CLINIC | Facility: CLINIC | Age: 49
End: 2024-09-04

## 2024-09-04 NOTE — TELEPHONE ENCOUNTER
Patient informed Mirena was placed on 3/2018.  Patient is using it for birth control.  Patient aware it will  in 3/2026.

## 2024-09-20 DIAGNOSIS — E66.01 MORBID OBESITY WITH BMI OF 40.0-44.9, ADULT (HCC): Primary | ICD-10-CM

## 2024-09-24 ENCOUNTER — HOSPITAL ENCOUNTER (OUTPATIENT)
Dept: ULTRASOUND IMAGING | Age: 49
Discharge: HOME | End: 2024-09-24
Attending: INTERNAL MEDICINE
Payer: COMMERCIAL

## 2024-09-24 ENCOUNTER — OFFICE VISIT (OUTPATIENT)
Facility: LOCATION | Age: 49
End: 2024-09-24
Payer: COMMERCIAL

## 2024-09-24 VITALS
SYSTOLIC BLOOD PRESSURE: 126 MMHG | DIASTOLIC BLOOD PRESSURE: 86 MMHG | WEIGHT: 252 LBS | HEART RATE: 103 BPM | BODY MASS INDEX: 45 KG/M2

## 2024-09-24 DIAGNOSIS — E27.9 ADRENAL GLAND DISEASE (HCC): ICD-10-CM

## 2024-09-24 DIAGNOSIS — R79.89 INCREASED PTH LEVEL: ICD-10-CM

## 2024-09-24 DIAGNOSIS — E55.9 VITAMIN D DEFICIENCY: ICD-10-CM

## 2024-09-24 DIAGNOSIS — E83.52 HYPERCALCEMIA: ICD-10-CM

## 2024-09-24 DIAGNOSIS — E78.5 DYSLIPIDEMIA: ICD-10-CM

## 2024-09-24 DIAGNOSIS — E53.8 VITAMIN B12 DEFICIENCY: ICD-10-CM

## 2024-09-24 DIAGNOSIS — E27.9 ADRENAL NODULE (HCC): ICD-10-CM

## 2024-09-24 DIAGNOSIS — I10 ESSENTIAL HYPERTENSION: ICD-10-CM

## 2024-09-24 DIAGNOSIS — D35.02 ADENOMA OF LEFT ADRENAL GLAND: ICD-10-CM

## 2024-09-24 DIAGNOSIS — E21.3 HYPERPARATHYROIDISM (HCC): ICD-10-CM

## 2024-09-24 DIAGNOSIS — K76.0 FATTY LIVER: ICD-10-CM

## 2024-09-24 DIAGNOSIS — E21.3 HYPERPARATHYROIDISM (HCC): Primary | ICD-10-CM

## 2024-09-24 PROCEDURE — 3079F DIAST BP 80-89 MM HG: CPT | Performed by: INTERNAL MEDICINE

## 2024-09-24 PROCEDURE — 76536 US EXAM OF HEAD AND NECK: CPT | Performed by: INTERNAL MEDICINE

## 2024-09-24 PROCEDURE — 3074F SYST BP LT 130 MM HG: CPT | Performed by: INTERNAL MEDICINE

## 2024-09-24 PROCEDURE — 99214 OFFICE O/P EST MOD 30 MIN: CPT | Performed by: INTERNAL MEDICINE

## 2024-09-24 NOTE — PATIENT INSTRUCTIONS
Salivary cortisol at bedtime       US parathyroid   Refer to ENT      SALIVARY CORTISOL    Please  the saliva collection kit from the lab. The instructions for the saliva collection will be provided to you. We need two separate salivary cortisol collections. You can do them 3 days apart.     - Do not brush teeth for 1 hour before collecting specimen.   -Do not eat or drink for 30 minutes prior to specimen collection.   -Do not drink alcohol or smoke 12 hours before  -Collect specimen at bedtime and record collection time.   -Do not apply any type of oral steroid creams as that can interfere with the test results.   -Using the [place] collection tube, remove the cotton roll & place under the tongue. Chew lightly until cotton roll is saturated with saliva. Return cotton roll to collection tube & cap. Label tube with name & collection time/date. Refrigerate tube until it can be mailed or taken to the lab      RTC in 3-6 mo

## 2024-09-24 NOTE — PROGRESS NOTES
Reason for Visit:    hypercalcemia and left adrenal adenoma.  Requesting Physician:   .Sofia Mesa MD    CHIEF COMPLAINT:    Chief Complaint   Patient presents with    Follow - Up        HISTORY OF PRESENT ILLNESS:   Jaye Abebe is a 49 year old female who presents with primary hyperparathyroidism, hypercalcemia, left adrenal adenoma,  Obese, HTN, Low vit D and low B12, Smoker and HR is high.     Was seen with her   Started Semaglutide 0.25 took 4 inj     Saw oncology for leukemia concern and she was discharged.   No kidney stones  Drinks more water and urinates more   Denied Polyuria, Polydipsia, Nephrolithiasis, nephrocalcinosis   Denied Distal renal tubular acidosis, Acute and chronic renal insufficiency   Gastrointestinal: Denied Anorexia, nausea, vomiting, Bowel hypomotility and constipation, Pancreatitis, Peptic ulcer disease,   Musculoskeletal: Denied Muscle weakness, Bone pain,   Neurologic: Denied decreased concentration, Confusion, Fatigue, Stupor, coma,        Wt Readings from Last 6 Encounters:   09/24/24 252 lb (114.3 kg)   08/19/24 255 lb (115.7 kg)   08/13/24 255 lb 6.4 oz (115.8 kg)   08/12/24 252 lb (114.3 kg)   07/30/24 255 lb 6.4 oz (115.8 kg)   06/26/24 251 lb (113.9 kg)     Lab Results   Component Value Date    A1C 5.6 05/16/2024    A1C 5.4 06/20/2023    A1C 5.6 07/02/2021      No LMP recorded. (Menstrual status: IUD - Intrauterine Device).    Had US thyroid after clinic on 9/24/2024   No suspicious nodule or mass along the posterior margin of either the right or left thyroid lobe to suggest a parathyroid adenoma by ultrasound.  Given clinical context, consider further evaluation with a nuclear medicine sestamibi scan.   . A 1.2 cm TR-4 nodule in the right lobe of the thyroid gland.  Given size, this requires continued 12 month surveillance imaging.   . Small additional 0.5 cm TR-3 nodule in the left lobe of the thyroid gland.  Given size and sonographic characteristics, such  nodules are typically considered benign and require no additional follow-up.     ASSESSMENT AND PLAN:  Jaye Abebe is a 49 year old female who presents with primary hyperparathyroidism, hypercalcemia, left adrenal adenoma,  Obese, HTN, Low vit D and low B12, Smoker and HR is high.     # adrenal adenoma   W/u is negative for pheo and hypovolemia    Salivary cortisol one high and one normal , will repeat   24 hr urine cortisol was ordered but not done   MRI in 2023 showed stable left adrenal adenoma. Was seen in 2018 on CT scan.      #primary hyperparathyroid. Needs imaging and ENT consult      Plan  Salivary cortisol at bedtime       US parathyroid -   Refer to ENT  Update, Had US thyroid after clinic on 9/24/2024 which could not localize parathyroid. She will have a consult with ENT  on 9/27/2024     SALIVARY CORTISOL    Please  the saliva collection kit from the lab. The instructions for the saliva collection will be provided to you. We need two separate salivary cortisol collections. You can do them 3 days apart.     - Do not brush teeth for 1 hour before collecting specimen.   -Do not eat or drink for 30 minutes prior to specimen collection.   -Do not drink alcohol or smoke 12 hours before  -Collect specimen at bedtime and record collection time.   -Do not apply any type of oral steroid creams as that can interfere with the test results.   -Using the Salivette collection tube, remove the cotton roll & place under the tongue. Chew lightly until cotton roll is saturated with saliva. Return cotton roll to collection tube & cap. Label tube with name & collection time/date. Refrigerate tube until it can be mailed or taken to the lab                 PAST MEDICAL HISTORY:   Past Medical History:    Bilateral carpal tunnel syndrome    Current moderate episode of major depressive disorder without prior episode (HCC)    Essential hypertension    Focal nodular hyperplasia of liver    Generalized anxiety disorder     Hyperlipidemia    Irritable bowel syndrome with diarrhea    PTSD (post-traumatic stress disorder)    Severe persistent asthma without complication (HCC)       PAST SURGICAL HISTORY:   Past Surgical History:   Procedure Laterality Date    Cholecystectomy  2002    Colonoscopy N/A 01/30/2018    Procedure: COLONOSCOPY;  Surgeon: Timbo Sue MD;  Location: [facility] ENDOSCOPY    Colonoscopy N/A 03/30/2021    Procedure: COLONOSCOPY;  Surgeon: Timbo Sue MD;  Location: [facility] ENDOSCOPY    Cyst aspiration left      Knee arthroscopy  1994       CURRENT MEDICATIONS:     CUSTOM MEDICATION Semaglutide/ cyanocobalamin    0.5 mg-   Concentration: 1/0.5 mg/ML   Amount: 2.5 ML vial  Instructions: Inject 50 units/ 0.5 ML q weekly 1 each 5    fluticasone-salmeterol 250-50 MCG/ACT Inhalation Aerosol Powder, Breath Activated Inhale 1 puff into the lungs 2 (two) times daily.      sertraline 100 MG Oral Tab Take 2 tablets (200 mg total) by mouth daily. 120 tablet 5    albuterol 108 (90 Base) MCG/ACT Inhalation Aero Soln Inhale 2 puffs into the lungs every 6 (six) hours as needed for Wheezing or Shortness of Breath. 3 each 3    tiotropium (SPIRIVA HANDIHALER) 18 MCG Inhalation Cap INHALE 1 CAPSULE VIA HANDIHALER ONCE DAILY AT THE SAME TIME EVERY DAY 90 capsule 3    Nystatin [id number] UNIT/GM External Powder Apply 1 Application  topically 4 (four) times daily. Apply to affected areas 60 g 1    topiramate 25 MG Oral Tab Take 1 tablet (25 mg total) by mouth 2 (two) times daily. 180 tablet 1    lisinopril 10 MG Oral Tab Take 1 tablet (10 mg total) by mouth daily. 90 tablet 3    levonorgestrel 20 MCG/24HR Intrauterine IUD Use as directed  Mirena Lot [id number]   11-10         ALLERGIES:  No Known Allergies    SOCIAL HISTORY:    Social History     Socioeconomic History    Marital status:    Tobacco Use    Smoking status: Every Day     Current packs/day: 0.50     Average packs/day: 0.5 packs/day for 35.7 years (17.9 ttl  pk-yrs)     Types: Cigarettes     Start date: 1989    Smokeless tobacco: Never   Vaping Use    Vaping status: Never Used   Substance and Sexual Activity    Alcohol use: Yes     Comment: Occasional - once a month    Drug use: Yes     Types: Cannabis     Comment: has a medical card     Sexual activity: Yes     Birth control/protection: Mirena     Comment: 03/2018   Other Topics Concern    Caffeine Concern Yes     Comment: 1 cup daily soda/coffee    Pt has a pacemaker No    Pt has a defibrillator No    Breast feeding No    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  Smoking yes, 1/3 ppd   Marijuana yes   Etoh social  Drugs no  FAMILY HISTORY:   Family History   Problem Relation Age of Onset    Depression Mother     No Known Problems Father         Not in contact    Cancer Sister 18        Hodgkin's Lymphoma    No Known Problems Sister     Breast Cancer Maternal Grandmother 50    Dementia Maternal Grandfather     No Known Problems Paternal Grandmother     Diabetes Paternal Grandfather     Colon Cancer Paternal Uncle         50s    Schizophrenia Maternal Aunt     Mental Disorder Maternal Cousin         REVIEW OF SYSTEMS:  All negative other than HPI    PHYSICAL EXAM:   Height: --  Weight: 252 lb (114.3 kg) (09/24 0901)  BSA (Calculated - sq m): --  Pulse: 103 (09/24 0901)  BP: 126/86 (09/24 0901)  Temp: --  Do Not Use - Resp Rate: --  SpO2: --    Body mass index is 44.64 kg/m².  No striae   No CVA   No spine tenderness   CONSTITUTIONAL:  Awake and alert. Age appropriate, good hygiene not in acute distress. Well-nourished and well developed. no acute distress   PSYCH:   Orientated to time, place, person & situation, Normal mood and affect, memory intact, normal insight and judgment, cooperative  Neuro: speech is clear. Awake, alert, no aphasia, no facial asymmetry, no nuchal rigidity  EYES:  No proptosis, no ptosis, conjunctiva normal  ENT:  Normocephalic, atraumatic  Eye: EOMI, normal lids, no discharge, no conjunctival  erythema. No exophthalmos/proptosis, Ptosis negative   No rhinorrhea, moist oral mucosa  Neck: full range of motion  Neck/Thyroid: neck inspection: normal, No scar, No goiter   LUNGS:  No acute respiratory distress, non-labored respiration. Speaking full sentences  CARDIOVASCULAR:  regular rate   ABDOMEN:  No abdominal pain.   SKIN:  no bruising or bleeding, no rashes and no lesions, Skin is dry, no obvious rashes or lesions  EXTREMITIES: no gross abnormality   MSK: Moves extremities spontaneously. full range of motion in all major joints      DATA:     Pertinent data reviewed   Latest Reference Range & Units 08/17/24 08:57   DHEA SULFATE 25.9 - 460.2 ug/dL 100.3      Latest Reference Range & Units 08/17/24 08:57   A/G Ratio 1.0 - 2.0  1.8   PROTEIN, TOTAL 5.7 - 8.2 g/dL 7.2   Albumin 3.2 - 4.8 g/dL 4.6   VITAMIN D, 25-OH, TOTAL 30.0 - 100.0 ng/mL 27.7 (L)   RENIN ACTIVITY 0.167 - 5.380 ng/mL/hr 8.630 (H)   NORMETANEPHRINE 0.0 - 218.9 pg/mL 100.0   METANEPHRINE 0.0 - 88.0 pg/mL 37.9   Patient Fasting for CMP?  Yes   PTH INTACT 18.5 - 88.0 pg/mL 88.0   Cortisol ug/dL  ug/dL 20.5  20.4   ACTH 7.2 - 63.3 pg/mL 23.0   ALDOSTERONE 0.0 - 30.0 ng/dL 3.1      Latest Reference Range & Units 08/17/24 08:57   CREATININE 0.55 - 1.02 mg/dL 0.82   CALCIUM 8.7 - 10.4 mg/dL 10.6 (H)       (H): Data is abnormally high   Latest Reference Range & Units 08/19/24 06:30   Urine Volume 24Hr mL 1,750   CALCIUM URINE CALC 100 - 300 mg/24hr 315 (H)   Creatinine, Urine Not Estab. mg/dL 83.6   (H): Data is abnormally high   Latest Reference Range & Units 08/19/24 06:30   Creat 24h Ur 800 - 1800 mg/24 hr 1463   Dopamine 24h Ur 0 - 510 ug/24 hr 47   Dopamine Ur Undefined ug/L 27   Epineph 24h Ur 0 - 20 ug/24 hr <5   Epineph Ur Undefined ug/L <3   Norepineph 24h Ur 0 - 135 ug/24 hr 46   Norepineph Ur Undefined ug/L 26          Latest Reference Range & Units 08/17/24 08:57 08/18/24 15:20 08/25/24 07:15   Salivary Cortisol MS ug/dL  0.068 0.146    Sex Horm Bind Glob 24.6 - 122.0 nmol/L 36.7     Testost % Free+Weak Bnd 3.0 - 18.0 % 13.0     Testost Free+Weak Bnd 0.0 - 9.5 ng/dL 3.5     Testosterone Tot LC/MS ng/dL 26.7     Vit D 1,25 di-OH 24.8 - 81.5 pg/mL 69.1        CT 2/2018 ADRENALS:   There is a 1.5 cm low-attenuation (approximately 4 Hounsfield units) left adrenal nodule. imaging characteristics compatible with a benign adenoma   MRI 3/2023 Stable left adrenal adenoma.    Latest Reference Range & Units 06/20/23 07:29 07/17/23 07:34 05/16/24 15:15 06/06/24 08:13 07/31/24 14:30   CALCIUM 8.7 - 10.4 mg/dL 10.5 (H) 10.4 (H) 11.2 (H) 11.4 (H) 10.6 (H)      Latest Reference Range & Units 05/16/24 15:15 06/06/24 08:13 07/31/24 14:30   T4,Free (Direct) 0.8 - 1.7 ng/dL 1.4     TSH 0.550 - 4.780 mIU/mL 0.525 (L)  0.785   T3 FREE 2.40 - 4.20 pg/mL 2.99     PTH INTACT 18.5 - 88.0 pg/mL  108.1 (H)       Latest Reference Range & Units 06/20/23 07:29   Alkaline Phosphatase, Bone-Specific ug/L 24.5        No results for input(s): \"TSH\", \"T4F\", \"T3F\", \"THYP\" in the last 72 hours.  US THYROID (CPT=76536)    Result Date: 9/24/2024  CONCLUSION:  1. No suspicious nodule or mass along the posterior margin of either the right or left thyroid lobe to suggest a parathyroid adenoma by ultrasound.  Given clinical context, consider further evaluation with a nuclear medicine sestamibi scan. 2. A 1.2 cm TR-4 nodule in the right lobe of the thyroid gland.  Given size, this requires continued 12 month surveillance imaging. 3. Small additional 0.5 cm TR-3 nodule in the left lobe of the thyroid gland.  Given size and sonographic characteristics, such nodules are typically considered benign and require no additional follow-up. 4. Lesser incidental findings as above.     elm-remote  Dictated by (CST): Adrian Burciaga MD on 9/24/2024 at 4:32 PM     Finalized by (CST): Adrian Burciaga MD on 9/24/2024 at 4:35 PM         US THYROID (CPT=76536)          PROCEDURE: US THYROID (CPT= 51632)      COMPARISON: None.     INDICATIONS: E78.5 Dyslipidemia E27.9 Adrenal gland disease (HCC) K76.0 Fatty liver R79.89 Increased PTH level E53.8 Vitamin B12 deficiency E55.9 Vitamin D deficiency I10 E*     TECHNIQUE: High-resolution ultrasound was performed of the thyroid gland.     FINDINGS:   RIGHT LOBE: 4.2 x 1.5 x 1.5 cm,  4.6 ml.  Homogeneous echogenicity.  In the interpolar region of the right lobe, there is a solid 1.2 x 0.7 x 0.7 cm hypoechoic nodule (TR-4) with associated peripheral/macro calcification.     LEFT LOBE: 4.4 x 1.5 x 1.4 cm,  4.2 ml.  Homogeneous echogenicity.  In the lower pole of the left lobe, there is a small solid 0.5 x 0.3 x 0.4 cm isoechoic nodule (TR-3).     ISTHMUS: 0.3-0.4 cm AP diameter in the midline.  Homogeneous echogenicity.  No masses.       OTHER: No masses or adenopathy.                =====  CONCLUSION:   1. No suspicious nodule or mass along the posterior margin of either the right or left thyroid lobe to suggest a parathyroid adenoma by ultrasound.  Given clinical context, consider further evaluation with a nuclear medicine sestamibi scan.  2. A 1.2 cm TR-4 nodule in the right lobe of the thyroid gland.  Given size, this requires continued 12 month surveillance imaging.  3. Small additional 0.5 cm TR-3 nodule in the left lobe of the thyroid gland.  Given size and sonographic characteristics, such nodules are typically considered benign and require no additional follow-up.  4. Lesser incidental findings as above.           elm-remote     Dictated by (CST): Adrian Burciaga MD on 9/24/2024 at 4:32 PM       Finalized by (CST): Adrian Burciaga MD on 9/24/2024 at 4:35 PM                 Orders Placed This Encounter   Procedures    Salivary Cortisol     Orders Placed This Encounter    Salivary Cortisol     Standing Status:   Future     Standing Expiration Date:   9/24/2025     Order Specific Question:   Release to patient     Answer:   Immediate          This is a specialized patient  consultation in endocrinology and required comprehensive review of prior records, as well as current evaluation, with time required for consideration of complex endocrine issues and consultation. For this visit, I personally interviewed the patient, and family member if accompanied, performed the pertinent parts of the history and physical examination. ROS included screening for appropriate endocrine conditions.   Today's diagnosis and plan were reviewed in detail with the patient who states understanding and agrees with plan. I discussed with the patient possible diagnosis, differential diagnosis, need for work up, treatment options, alternatives and side effects.     Please see note for details about time spent which includes:   · pre-visit preparation  · reviewing records  · face to face time with the patient   · timely documentation of the encounter  · ordering medications/tests  · communication with care team  · care coordination    I appreciate the opportunity to be part of your patient's medical care and will keep you, as the referring and primary physicians, informed about the care of your patient. Please feel free to contact me should you have any questions.    The 21st Century Cures Act makes medical notes like these available to patients in the interest of transparency. Please be advised this is a medical document. Medical documents are intended to carry relevant information, facts as evident, and the clinical opinion of the practitioner. The medical note is intended as peer to peer communication and may appear blunt or direct. It is written in medical language and may contain abbreviations or verbiage that are unfamiliar.   Adonay Zaman MD

## 2024-09-25 ENCOUNTER — PATIENT MESSAGE (OUTPATIENT)
Facility: LOCATION | Age: 49
End: 2024-09-25

## 2024-09-26 ENCOUNTER — APPOINTMENT (OUTPATIENT)
Dept: LAB | Facility: HOSPITAL | Age: 49
End: 2024-09-26
Attending: INTERNAL MEDICINE
Payer: COMMERCIAL

## 2024-09-26 PROCEDURE — 82533 TOTAL CORTISOL: CPT

## 2024-09-26 NOTE — TELEPHONE ENCOUNTER
From: Jaye Abebe  To: Adonay Zaman  Sent: 9/25/2024 3:37 PM CDT  Subject: Ultrasound     Hello,  I saw the results of the ultrasound and it indicated that I do not have parathyroid. Should we the nuclear ultrasound? Do I need to see Parker? Why should they still be removed?    Thanks  Jaye

## 2024-10-01 ENCOUNTER — LAB ENCOUNTER (OUTPATIENT)
Dept: LAB | Age: 49
End: 2024-10-01
Attending: INTERNAL MEDICINE
Payer: COMMERCIAL

## 2024-10-01 DIAGNOSIS — E83.52 HYPERCALCEMIA: ICD-10-CM

## 2024-10-01 DIAGNOSIS — E78.5 DYSLIPIDEMIA: ICD-10-CM

## 2024-10-01 DIAGNOSIS — D35.02 ADENOMA OF LEFT ADRENAL GLAND: ICD-10-CM

## 2024-10-01 DIAGNOSIS — E27.9 ADRENAL NODULE (HCC): ICD-10-CM

## 2024-10-01 DIAGNOSIS — K76.0 FATTY LIVER: ICD-10-CM

## 2024-10-01 DIAGNOSIS — I10 ESSENTIAL HYPERTENSION: ICD-10-CM

## 2024-10-01 DIAGNOSIS — E21.3 HYPERPARATHYROIDISM (HCC): ICD-10-CM

## 2024-10-01 DIAGNOSIS — E27.9 ADRENAL GLAND DISEASE (HCC): ICD-10-CM

## 2024-10-01 DIAGNOSIS — E53.8 VITAMIN B12 DEFICIENCY: ICD-10-CM

## 2024-10-01 DIAGNOSIS — R79.89 INCREASED PTH LEVEL: ICD-10-CM

## 2024-10-01 DIAGNOSIS — E55.9 VITAMIN D DEFICIENCY: ICD-10-CM

## 2024-10-04 ENCOUNTER — OFFICE VISIT (OUTPATIENT)
Dept: OTOLARYNGOLOGY | Facility: CLINIC | Age: 49
End: 2024-10-04
Payer: COMMERCIAL

## 2024-10-04 VITALS — WEIGHT: 250 LBS | HEIGHT: 63 IN | BODY MASS INDEX: 44.3 KG/M2

## 2024-10-04 DIAGNOSIS — D35.1 PARATHYROID ADENOMA: Primary | ICD-10-CM

## 2024-10-04 PROCEDURE — 99213 OFFICE O/P EST LOW 20 MIN: CPT | Performed by: OTOLARYNGOLOGY

## 2024-10-04 PROCEDURE — 3008F BODY MASS INDEX DOCD: CPT | Performed by: OTOLARYNGOLOGY

## 2024-10-04 NOTE — PROGRESS NOTES
Jaye Abebe is a 49 year old female.    Chief Complaint   Patient presents with    Thyroid Problem     Referred by Dr. Zaman to go over thyroid ultrasound results       HISTORY OF PRESENT ILLNESS    She presents upon recommendation by her dermatologist for evaluation of neoplasm of uncertain behavior just beneath the eyebrow on the left.  This is lateral in the eye and she notes it has been increasing in size.  Has a small skin tag of the right eyelid as well.  No other signs, symptoms or complaints.  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  Here for suture removal.  No complaints or concerns     10/4/24 he presents today with a history of elevated blood calcium since 2023.  Previously around 10.5 more recently 10.6.  PTH originally around 83 and most recent blood draw 88.  Negative ultrasound of the thyroid for parathyroid adenoma.  She does complain of weakness fatigue does have a history of adrenal adenoma.  Having a work this process as well.  They have ruled out a pheochromocytoma.  Here to discuss her probable hyperparathyroidism.    Social History     Socioeconomic History    Marital status:    Tobacco Use    Smoking status: Every Day     Current packs/day: 0.50     Average packs/day: 0.5 packs/day for 35.8 years (17.9 ttl pk-yrs)     Types: Cigarettes     Start date: 1989    Smokeless tobacco: Never   Vaping Use    Vaping status: Never Used   Substance and Sexual Activity    Alcohol use: Yes     Comment: Occasional - once a month    Drug use: Yes     Types: Cannabis     Comment: has a medical card     Sexual activity: Yes     Birth control/protection: Mirena     Comment: 03/2018   Other Topics Concern    Caffeine Concern Yes     Comment: 1 cup daily soda/coffee    Pt has a pacemaker No    Pt has a defibrillator No    Breast feeding No    Reaction to local anesthetic No        Family History   Problem Relation Age of Onset    Depression Mother     No Known Problems Father         Not in contact    Cancer Sister 18        Hodgkin's Lymphoma    No Known Problems Sister     Breast Cancer Maternal Grandmother 50    Dementia Maternal Grandfather     No Known Problems Paternal Grandmother     Diabetes Paternal Grandfather     Colon Cancer Paternal Uncle         50s    Schizophrenia Maternal Aunt     Mental Disorder Maternal Cousin        Past Medical History:    Bilateral carpal tunnel syndrome    Current moderate episode of major depressive disorder without prior episode (HCC)    Essential hypertension    Focal nodular hyperplasia of liver    Generalized anxiety disorder    Hyperlipidemia    Irritable bowel syndrome with diarrhea    PTSD (post-traumatic stress disorder)    Severe persistent asthma without complication (HCC)       Past Surgical History:   Procedure Laterality Date    Cholecystectomy  2002    Colonoscopy N/A 01/30/2018    Procedure: COLONOSCOPY;  Surgeon: Timbo Sue MD;  Location: [facility] END[facility]    Colonoscopy N/A 03/30/2021    Procedure: COLONOSCOPY;  Surgeon: Timbo Sue MD;  Location: [facility]    Cyst aspiration left      Knee arthroscopy  1994         REVIEW OF SYSTEMS    System Neg/Pos Details   Constitutional Negative Fatigue, fever and weight loss.   ENMT Negative Drooling.   Eyes Negative Blurred vision and vision changes.   Respiratory Negative Dyspnea and wheezing.   Cardio Negative Chest pain, irregular heartbeat/palpitations and syncope.   GI Negative Abdominal pain and diarrhea.   Endocrine Negative Cold intolerance and heat intolerance.   Neuro Negative Tremors.   Psych Negative Anxiety and depression.   Integumentary Negative Frequent skin infections, pigment change and rash.   Hema/Lymph Negative Easy bleeding and easy bruising.           PHYSICAL EXAM    Ht 5' 3\" (1.6 m)   Wt 250 lb (113.4 kg)   BMI 44.29 kg/m²         Constitutional Normal Overall appearance - Normal.   Psychiatric Normal Orientation - Oriented to time, place, person & situation. Appropriate mood and affect.   Neck Exam Normal Inspection - Normal. Palpation - Normal. Parotid gland - Normal. Thyroid gland - Normal.   Eyes Normal Conjunctiva - Right: Normal, Left: Normal. Pupil - Right: Normal, Left: Normal. Fundus - Right: Normal, Left: Normal.   Neurological Normal Memory - Normal. Cranial nerves - Cranial nerves II through XII grossly intact.   Head/Face Normal Facial features - Normal. Eyebrows - Normal. Skull - Normal.        Nasopharynx Normal External nose - Normal. Lips/teeth/gums - Normal. Tonsils - Normal. Oropharynx - Normal.   Ears Normal Inspection - Right: Normal, Left: Normal. Canal - Right: Normal, Left: Normal. TM - Right: Normal, Left: Normal.   Skin Normal Inspection - Normal.        Lymph Detail Normal Submental. Submandibular. Anterior cervical. Posterior cervical. Supraclavicular.        Nose/Mouth/Throat Normal External nose - Normal. Lips/teeth/gums - Normal. Tonsils - Normal. Oropharynx - Normal.   Nose/Mouth/Throat Normal Nares - Right: Normal Left: Normal. Septum -Normal  Turbinates - Right: Normal, Left: Normal.       Current Outpatient Medications:     CUSTOM MEDICATION, Semaglutide/ cyanocobalamin  0.5 mg-  Concentration: 1/0.5 mg/ML  Amount: 2.5 ML vial Instructions: Inject 50 units/ 0.5 ML q weekly, Disp: 1 each, Rfl: 5    fluticasone-salmeterol 250-50 MCG/ACT Inhalation Aerosol Powder, Breath Activated, Inhale 1 puff into the lungs 2 (two) times daily., Disp: , Rfl:     sertraline 100 MG Oral Tab, Take 2 tablets (200 mg total) by mouth daily., Disp: 120 tablet, Rfl: 5    albuterol 108 (90 Base) MCG/ACT Inhalation Aero Soln, Inhale 2 puffs into the lungs every 6 (six) hours as needed for Wheezing or Shortness of Breath., Disp: 3 each, Rfl: 3    tiotropium (SPIRIVA HANDIHALER) 18 MCG Inhalation Cap, INHALE 1 CAPSULE VIA HANDIHALER  ONCE DAILY AT THE SAME TIME EVERY DAY, Disp: 90 capsule, Rfl: 3    Nystatin [id number] UNIT/GM External Powder, Apply 1 Application  topically 4 (four) times daily. Apply to affected areas, Disp: 60 g, Rfl: 1    topiramate 25 MG Oral Tab, Take 1 tablet (25 mg total) by mouth 2 (two) times daily., Disp: 180 tablet, Rfl: 1    lisinopril 10 MG Oral Tab, Take 1 tablet (10 mg total) by mouth daily., Disp: 90 tablet, Rfl: 3    levonorgestrel 20 MCG/24HR Intrauterine IUD, Use as directed  Mirena Lot [id number]   11-10, Disp: , Rfl:   ASSESSMENT AND PLAN    1. Parathyroid adenoma  Most likely has a parathyroid adenoma she has elevated urine calcium blood calcium despite PTH being 88.  So far ultrasound has been negative but did not have an ultrasound of the parathyroids as well so unclear if the technician looked for parathyroid adenoma.  I did recommend a cystoscopy may be nuclear scan with SPECT to rule out a parathyroid adenoma.  Return to see me after her study to discuss future management.  - NM PARATHYROID WITH SPECT (CPT=78071); Future        This note was prepared using Dragon Medical voice recognition dictation software. As a result errors may occur. When identified these errors have been corrected. While every attempt is made to correct errors during dictation discrepancies may still exist    Arron Parker MD    10/4/2024    5:21 PM

## 2024-10-09 LAB — SALIVARY CORTISOL MS: 0.12 UG/DL

## 2024-10-10 ENCOUNTER — TELEPHONE (OUTPATIENT)
Dept: ENDOCRINOLOGY CLINIC | Facility: CLINIC | Age: 49
End: 2024-10-10

## 2024-10-15 ENCOUNTER — OFFICE VISIT (OUTPATIENT)
Dept: GASTROENTEROLOGY | Facility: CLINIC | Age: 49
End: 2024-10-15

## 2024-10-15 VITALS
SYSTOLIC BLOOD PRESSURE: 124 MMHG | BODY MASS INDEX: 43.77 KG/M2 | DIASTOLIC BLOOD PRESSURE: 84 MMHG | HEIGHT: 63 IN | WEIGHT: 247 LBS

## 2024-10-15 DIAGNOSIS — K76.89 FOCAL NODULAR HYPERPLASIA OF LIVER: ICD-10-CM

## 2024-10-15 DIAGNOSIS — R74.8 ELEVATED ALKALINE PHOSPHATASE LEVEL: Primary | ICD-10-CM

## 2024-10-15 DIAGNOSIS — K76.0 FATTY LIVER: ICD-10-CM

## 2024-10-15 PROCEDURE — 3074F SYST BP LT 130 MM HG: CPT | Performed by: INTERNAL MEDICINE

## 2024-10-15 PROCEDURE — 99204 OFFICE O/P NEW MOD 45 MIN: CPT | Performed by: INTERNAL MEDICINE

## 2024-10-15 PROCEDURE — 3008F BODY MASS INDEX DOCD: CPT | Performed by: INTERNAL MEDICINE

## 2024-10-15 PROCEDURE — 3079F DIAST BP 80-89 MM HG: CPT | Performed by: INTERNAL MEDICINE

## 2024-10-15 NOTE — H&P
[facility] - Gastroenterology                                                                                                               Reason for consult: Elevated alkaline phosphatase    Requesting physician or provider: Kennedy Mesa MD    Chief Complaint   Patient presents with    Consult     Abnormal labs       HPI:   Jaye Abebe is a 49 year old woman with history of BMI 43.75, hyperparathyroidism, adrenal nodule, hypercalcemia, COPD, dyslipidemia, hypertension, colon polyps, fatty liver, focal nodular hyperplasia of the liver, IBS presenting for evaluation of elevated alkaline phosphatase.    She notes that she was told she had fatty liver a long time ago.  She has been noted to have an elevated alkaline phosphatase on her labs.  Other liver enzymes have been normal.  She denies family history of liver disease.  She denies history of viral hepatitis.  She denies history of incarceration.  She denies any tattoos done outside of a tattoo parlor.  She denies jaundice, ascites, encephalopathy, GI bleeding.    She is being worked up for hyperparathyroidism.  She notes that a few months ago she was admitted to the hospital in septic shock that was thought to be due to a UTI.    She had previous MRI of her liver in 03/2023 with results as below:  FINDINGS:  LUNG BASES: No significant signal abnormality is identified.    LIVER: There is stable mild hepatic steatosis.  An ovoid T1 isointense/subtly T2 hyperintense to isointense circumscribed mass is again seen at the junction of segments V/VI measuring 5.8 x 5.7 x 4.5 cm (series 10, image 59 and series 13, image 21),  which is unchanged from the 08/23/2022 MRI.  The mass measured 4.9 x 4.4 x 4.6 cm on the 08/26/2021 MRI.  The mass again demonstrates slightly heterogeneous arterial phase hyperenhancement and again fades to liver background on the portal venous  and  delayed phase postcontrast sequences.  There is again question of a subtle thin T2 hyperintense central scar that demonstrates progressive enhancement best appreciated on the more delayed post-contrast sequences.  This lesion demonstrated retention of  contrast on the hepatobiliary phase postcontrast sequence obtained on the 08/23/2022 MRI.  These findings are again most compatible with focal nodular hyperplasia.  BILIARY: The gallbladder is again noted to be surgically absent.  The biliary tree is within normal limits for the patient's post cholecystectomy state.  PANCREAS: There is pancreas divisum.  There is normal signal intensity. No focal mass or main pancreatic duct dilatation is seen. There is no evidence of pancreatitis.  SPLEEN: No enlargement.    ADRENALS: There is a stable 2.0 x 1.7 x 2.1 cm left adrenal nodule that demonstrates signal dropout on the out of phase gradient recall echo sequence.  There are no right adrenal nodules.  KIDNEYS: No hydronephrosis or solid masses are apparent.    VASCULATURE: The portal vein, IVC, splenic, hepatic, renal veins, and mesenteric veins are patent.    LYMPH NODES: No lymphadenopathy.    ABDOMINAL WALL: Unremarkable.    BONES: Suboptimally assessed by scan protocol, but without grossly apparent bony lesion or fracture.       OTHER: No loculated fluid collections are appreciated.               Impression   CONCLUSION:  1. Enhancing right hepatic lobe mass measuring 5.8 cm is again most compatible with focal nodular hyperplasia (FNH).  While the lesion has demonstrated mild growth from the August 2021 MRI, the mass is unchanged from the more recent MRI performed  08/23/2022.  2. Stable mild hepatic steatosis.  3. Pancreas divisum.  4. Status post cholecystectomy.  No biliary ductal dilatation.  5. Stable left adrenal adenoma.     She has a history of colon polyps.  Last colonoscopy in 2021 with Dr. Sue.  She was recommended to repeat in 5 years (2026).    Wt  Readings from Last 6 Encounters:   10/15/24 247 lb (112 kg)   10/04/24 250 lb (113.4 kg)   09/24/24 252 lb (114.3 kg)   08/19/24 255 lb (115.7 kg)   08/13/24 255 lb 6.4 oz (115.8 kg)   08/12/24 252 lb (114.3 kg)        History, Medications, Allergies, ROS:      Past Medical History:    Bilateral carpal tunnel syndrome    Current moderate episode of major depressive disorder without prior episode (HCC)    Essential hypertension    Focal nodular hyperplasia of liver    Generalized anxiety disorder    Hyperlipidemia    Irritable bowel syndrome with diarrhea    PTSD (post-traumatic stress disorder)    Severe persistent asthma without complication (HCC)      Past Surgical History:   Procedure Laterality Date    Cholecystectomy  2002    Colonoscopy N/A 01/30/2018    Procedure: COLONOSCOPY;  Surgeon: Timbo Sue MD;  Location: [facility] ENDOSCOPY    Colonoscopy N/A 03/30/2021    Procedure: COLONOSCOPY;  Surgeon: Timbo Sue MD;  Location: [facility] ENDOSCOPY    Cyst aspiration left      Knee arthroscopy  1994      Family Hx:   Family History   Problem Relation Age of Onset    Depression Mother     No Known Problems Father         Not in contact    Cancer Sister 18        Hodgkin's Lymphoma    No Known Problems Sister     Breast Cancer Maternal Grandmother 50    Dementia Maternal Grandfather     No Known Problems Paternal Grandmother     Diabetes Paternal Grandfather     Colon Cancer Paternal Uncle         50s    Schizophrenia Maternal Aunt     Mental Disorder Maternal Cousin       Social History:   Social History     Socioeconomic History    Marital status:    Tobacco Use    Smoking status: Every Day     Current packs/day: 0.50     Average packs/day: 0.5 packs/day for 35.8 years (17.9 ttl pk-yrs)     Types: Cigarettes     Start date: 1989    Smokeless tobacco: Never   Vaping Use    Vaping status: Never Used   Substance and Sexual Activity    Alcohol use: Yes     Comment: Occasional - once a month     Drug use: Yes     Types: Cannabis     Comment: has a medical card     Sexual activity: Yes     Birth control/protection: Mirena     Comment: 03/2018   Other Topics Concern    Caffeine Concern Yes     Comment: 1 cup daily soda/coffee    Pt has a pacemaker No    Pt has a defibrillator No    Breast feeding No    Reaction to local anesthetic No   Social History Narrative    Relationships:  - Danny*    Children: Darin* (adult son, history of bipolar), Colin and Ancelmo (twins - 15M), Elsie (teacher)    Pets: Dog    School: N/A    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      CUSTOM MEDICATION Semaglutide/ cyanocobalamin    0.5 mg-   Concentration: 1/0.5 mg/ML   Amount: 2.5 ML vial  Instructions: Inject 50 units/ 0.5 ML q weekly 1 each 5    fluticasone-salmeterol 250-50 MCG/ACT Inhalation Aerosol Powder, Breath Activated Inhale 1 puff into the lungs 2 (two) times daily.      albuterol 108 (90 Base) MCG/ACT Inhalation Aero Soln Inhale 2 puffs into the lungs every 6 (six) hours as needed for Wheezing or Shortness of Breath. 3 each 3    tiotropium (SPIRIVA HANDIHALER) 18 MCG Inhalation Cap INHALE 1 CAPSULE VIA HANDIHALER ONCE DAILY AT THE SAME TIME EVERY DAY 90 capsule 3    Nystatin 352004 UNIT/GM External Powder Apply 1 Application  topically 4 (four) times daily. Apply to affected areas 60 g 1    topiramate 25 MG Oral Tab Take 1 tablet (25 mg total) by mouth 2 (two) times daily. 180 tablet 1    lisinopril 10 MG Oral Tab Take 1 tablet (10 mg total) by mouth daily. 90 tablet 3    levonorgestrel 20 MCG/24HR Intrauterine IUD Use as directed  Mirena Lot [id number]   11-10         Allergies:  Allergies[1]    ROS:   CONSTITUTIONAL:  negative for fevers, rigors  EYES:  negative for diplopia   RESPIRATORY:  negative  for severe shortness of breath  CARDIOVASCULAR:  negative for crushing sub-sternal chest pain  GASTROINTESTINAL:  see HPI  GENITOURINARY:  negative for dysuria or gross hematuria  INTEGUMENT/BREAST:  SKIN:  negative for jaundice   ALLERGIC/IMMUNOLOGIC:  negative for hay fever  ENDOCRINE:  negative for cold intolerance and heat intolerance  MUSCULOSKELETAL:  negative for joint effusion/severe erythema  BEHAVIOR/PSYCH:  negative for psychotic behavior    PHYSICAL EXAM:   Blood pressure 124/84, height 5' 3\" (1.6 m), weight 247 lb (112 kg), not currently breastfeeding.    Gen: appears comfortable and in no acute distress  HEENT: sclera appear anicteric, mucus membranes appear moist  CV: the extremities are warm and well-perfused   Lung: no increased work of breathing, no conversational dyspnea   Abd: soft, non-tender exam in all quadrants without rigidity or guarding, non-distended, no masses palpated  Skin: no jaundice, no apparent rashes, no spiders, no palmar erythema  Neuro: alert and interactive, no focal neuro deficits  Psych: cooperative, normal affect     Labs/Imaging:     Patient's pertinent labs and imaging were reviewed and discussed with patient today.      ASSESSMENT/PLAN:   Jaye Abebe is a 49 year old woman with history of BMI 43.75, hyperparathyroidism, adrenal nodule, hypercalcemia, COPD, dyslipidemia, hypertension, colon polyps, fatty liver, focal nodular hyperplasia of the liver, IBS presenting for evaluation of elevated alkaline phosphatase.    She presents for evaluation of isolated elevated alkaline phosphatase.  She does have a history of hypercalcemia and elevated PTH and is being worked up for hyperparathyroidism.  We did discuss that alkaline phosphatase can be elevated from the liver or from the bones.  Recommended a GGT.  Additionally, we will complete a chronic liver disease workup to rule out autoimmune causes, especially PBC, viral causes and genetic causes.  She does have a history of  fatty liver.  She is a social drinker.  Recommended abstinence from alcohol.  She does have MASH risk factors including BMI 43.75, hypertension, dyslipidemia.  She previously has been noted to have focal nodular hyperplasia on imaging.  This has been stable.  She is asymptomatic from this and it does not require further monitoring.  We could consider an ultrasound elastography, but I would hold on this in the absence of hepatocellular enzyme abnormalities.  We did discuss that the management of MASH is weight loss.  We discussed a Mediterranean diet and exercise.  She is following with bariatrics and was started on semaglutide.    Recommendations:  Get your labs checked.     Orders This Visit:  Orders Placed This Encounter   Procedures    Actin (Smooth Muscle) Antibody    Alpha-1-antirypsin, serum    Ceruloplasmin    Ferritin    GGT (Gamma Glutamyl Transpeptidase)    Hep A AB, Total    Hep B Core AB, Tot    Hepatitis B Surface Antibody    Hepatitis B Surface Antigen    Immunoglobulin A/G/M, Quant    Iron And Tibc    Mitochondrial (M2) Antibody       Meds This Visit:  Requested Prescriptions      No prescriptions requested or ordered in this encounter       Imaging & Referrals:  None       Carrie Caputo MD  [facility]  10/15/2024             [1] No Known Allergies

## 2024-10-22 ENCOUNTER — HOSPITAL ENCOUNTER (OUTPATIENT)
Dept: NUCLEAR MEDICINE | Facility: HOSPITAL | Age: 49
Discharge: HOME | End: 2024-10-22
Attending: OTOLARYNGOLOGY
Payer: COMMERCIAL

## 2024-10-22 ENCOUNTER — LAB ENCOUNTER (OUTPATIENT)
Dept: LAB | Facility: HOSPITAL | Age: 49
End: 2024-10-22
Attending: OTOLARYNGOLOGY
Payer: COMMERCIAL

## 2024-10-22 DIAGNOSIS — R74.8 ELEVATED ALKALINE PHOSPHATASE LEVEL: ICD-10-CM

## 2024-10-22 DIAGNOSIS — D35.1 PARATHYROID ADENOMA: ICD-10-CM

## 2024-10-22 LAB
CERULOPLASMIN SERPL-MCNC: 32 MG/DL (ref 20–60)
DEPRECATED HBV CORE AB SER IA-ACNC: 70 NG/ML
GGT SERPL-CCNC: 65 U/L
HAV AB SER QL IA: REACTIVE
HAV IGM SER QL: NONREACTIVE
HBV CORE AB SERPL QL IA: NONREACTIVE
HBV SURFACE AB SER QL: NONREACTIVE
HBV SURFACE AB SERPL IA-ACNC: <3.1 MIU/ML
HBV SURFACE AG SER-ACNC: <0.1 [IU]/L
HBV SURFACE AG SERPL QL IA: NONREACTIVE
IGA SERPL-MCNC: 276.5 MG/DL (ref 40–350)
IGM SERPL-MCNC: 66.2 MG/DL (ref 50–300)
IMMUNOGLOBULIN PNL SER-MCNC: 941 MG/DL (ref 650–1600)
IRON SATN MFR SERPL: 19 %
IRON SERPL-MCNC: 81 UG/DL
TIBC SERPL-MCNC: 432 UG/DL (ref 250–425)
TRANSFERRIN SERPL-MCNC: 290 MG/DL (ref 250–380)

## 2024-10-22 PROCEDURE — 86704 HEP B CORE ANTIBODY TOTAL: CPT

## 2024-10-22 PROCEDURE — 86709 HEPATITIS A IGM ANTIBODY: CPT

## 2024-10-22 PROCEDURE — 82728 ASSAY OF FERRITIN: CPT

## 2024-10-22 PROCEDURE — 87340 HEPATITIS B SURFACE AG IA: CPT

## 2024-10-22 PROCEDURE — 83540 ASSAY OF IRON: CPT

## 2024-10-22 PROCEDURE — 86706 HEP B SURFACE ANTIBODY: CPT

## 2024-10-22 PROCEDURE — 82784 ASSAY IGA/IGD/IGG/IGM EACH: CPT

## 2024-10-22 PROCEDURE — 82103 ALPHA-1-ANTITRYPSIN TOTAL: CPT

## 2024-10-22 PROCEDURE — 86708 HEPATITIS A ANTIBODY: CPT

## 2024-10-22 PROCEDURE — 83516 IMMUNOASSAY NONANTIBODY: CPT

## 2024-10-22 PROCEDURE — 82390 ASSAY OF CERULOPLASMIN: CPT

## 2024-10-22 PROCEDURE — 36415 COLL VENOUS BLD VENIPUNCTURE: CPT

## 2024-10-22 PROCEDURE — 84466 ASSAY OF TRANSFERRIN: CPT

## 2024-10-22 PROCEDURE — 78071 PARATHYRD PLANAR W/WO SUBTRJ: CPT | Performed by: OTOLARYNGOLOGY

## 2024-10-22 PROCEDURE — 82977 ASSAY OF GGT: CPT

## 2024-10-23 LAB
A-1-ANTITRYPSIN: 138 MG/DL
ACTIN SMOOTH MUSCLE AB: 5 UNITS
M2 MITOCHONDRIAL AB: <20 UNITS

## 2024-10-24 DIAGNOSIS — K76.0 METABOLIC DYSFUNCTION-ASSOCIATED STEATOTIC LIVER DISEASE (MASLD): Primary | ICD-10-CM

## 2024-10-24 NOTE — PROGRESS NOTES
Likely MASLD. GGT is elevated meaning alk phos elevation is related to liver. Recommend US elastography and weight loss.

## 2024-10-25 ENCOUNTER — TELEPHONE (OUTPATIENT)
Dept: OTOLARYNGOLOGY | Facility: CLINIC | Age: 49
End: 2024-10-25

## 2024-10-25 DIAGNOSIS — D35.1 PARATHYROID ADENOMA: Primary | ICD-10-CM

## 2024-10-29 ENCOUNTER — PATIENT MESSAGE (OUTPATIENT)
Dept: INTERNAL MEDICINE CLINIC | Facility: CLINIC | Age: 49
End: 2024-10-29

## 2024-10-29 DIAGNOSIS — Z23 NEED FOR HEPATITIS B VACCINATION: Primary | ICD-10-CM

## 2024-10-30 DIAGNOSIS — E66.01 MORBID OBESITY WITH BMI OF 40.0-44.9, ADULT (HCC): Primary | ICD-10-CM

## 2024-10-30 PROBLEM — Z23 NEED FOR HEPATITIS B VACCINATION: Status: ACTIVE | Noted: 2024-10-30

## 2024-11-02 ENCOUNTER — HOSPITAL ENCOUNTER (OUTPATIENT)
Dept: CT IMAGING | Facility: HOSPITAL | Age: 49
Discharge: HOME | End: 2024-11-02
Attending: OTOLARYNGOLOGY
Payer: COMMERCIAL

## 2024-11-02 DIAGNOSIS — D35.1 PARATHYROID ADENOMA: ICD-10-CM

## 2024-11-02 LAB
CREAT BLD-MCNC: 0.9 MG/DL
EGFRCR SERPLBLD CKD-EPI 2021: 79 ML/MIN/1.73M2 (ref 60–?)

## 2024-11-02 PROCEDURE — 82565 ASSAY OF CREATININE: CPT

## 2024-11-02 PROCEDURE — 70492 CT SFT TSUE NCK W/O & W/DYE: CPT | Performed by: OTOLARYNGOLOGY

## 2024-11-16 ENCOUNTER — PATIENT MESSAGE (OUTPATIENT)
Dept: OTOLARYNGOLOGY | Facility: CLINIC | Age: 49
End: 2024-11-16

## 2024-11-16 ENCOUNTER — PATIENT MESSAGE (OUTPATIENT)
Dept: INTERNAL MEDICINE CLINIC | Facility: CLINIC | Age: 49
End: 2024-11-16

## 2024-11-16 DIAGNOSIS — R82.998: Primary | ICD-10-CM

## 2024-11-18 NOTE — TELEPHONE ENCOUNTER
Patient requesting post imaging follow-up appointment. ENT referral for Dr. Parker entered and auto approved.     [email or web address] notification message sent to Thalia

## 2024-11-18 NOTE — TELEPHONE ENCOUNTER
Jaye Sosa attached some medical records for you to her [employer] message, she said you can discuss them with her at her next appointment.

## 2024-11-21 ENCOUNTER — OFFICE VISIT (OUTPATIENT)
Dept: PULMONOLOGY | Facility: CLINIC | Age: 49
End: 2024-11-21

## 2024-11-21 VITALS
HEIGHT: 63 IN | BODY MASS INDEX: 43.05 KG/M2 | WEIGHT: 243 LBS | HEART RATE: 101 BPM | SYSTOLIC BLOOD PRESSURE: 144 MMHG | RESPIRATION RATE: 16 BRPM | OXYGEN SATURATION: 98 % | DIASTOLIC BLOOD PRESSURE: 94 MMHG

## 2024-11-21 DIAGNOSIS — R06.83 SNORING: ICD-10-CM

## 2024-11-21 DIAGNOSIS — R29.818 SUSPECTED SLEEP APNEA: Primary | ICD-10-CM

## 2024-11-21 DIAGNOSIS — J44.9 COPD, MILD (HCC): ICD-10-CM

## 2024-11-21 PROCEDURE — 3008F BODY MASS INDEX DOCD: CPT | Performed by: PHYSICIAN ASSISTANT

## 2024-11-21 PROCEDURE — 99204 OFFICE O/P NEW MOD 45 MIN: CPT | Performed by: PHYSICIAN ASSISTANT

## 2024-11-21 PROCEDURE — 3080F DIAST BP >= 90 MM HG: CPT | Performed by: PHYSICIAN ASSISTANT

## 2024-11-21 PROCEDURE — 3077F SYST BP >= 140 MM HG: CPT | Performed by: PHYSICIAN ASSISTANT

## 2024-11-21 RX ORDER — TIOTROPIUM BROMIDE 18 UG/1
1 CAPSULE ORAL; RESPIRATORY (INHALATION) DAILY
Qty: 90 CAPSULE | Refills: 3 | Status: SHIPPED | OUTPATIENT
Start: 2024-11-21

## 2024-11-21 RX ORDER — CALCIUM CARBONATE 500 MG/1
1 TABLET, CHEWABLE ORAL AS NEEDED
COMMUNITY

## 2024-11-21 NOTE — PATIENT INSTRUCTIONS
Call to schedule sleep study - [phone].  Start Spiriva daily (1 capsule = 2 puffs).   You can continue to use albuterol 2 puffs every 6 hours as needed for shortness of breath, chest tightness, and wheezing.  Recommend influenza (flu) vaccine.  Call if any worsening respiratory symptoms.  Continue to work on cutting back with smoking. We will discuss again at your next appointment.
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
## 2024-11-22 ENCOUNTER — TELEPHONE (OUTPATIENT)
Dept: INTERNAL MEDICINE CLINIC | Facility: CLINIC | Age: 49
End: 2024-11-22

## 2024-11-22 PROBLEM — R79.89 INCREASED PTH LEVEL: Status: RESOLVED | Noted: 2024-06-06 | Resolved: 2024-11-22

## 2024-11-22 PROBLEM — R00.2 PALPITATIONS: Status: RESOLVED | Noted: 2024-07-30 | Resolved: 2024-11-22

## 2024-11-22 PROBLEM — M79.672 LEFT FOOT PAIN: Status: RESOLVED | Noted: 2024-08-19 | Resolved: 2024-11-22

## 2024-11-26 ENCOUNTER — PATIENT MESSAGE (OUTPATIENT)
Dept: INTERNAL MEDICINE CLINIC | Facility: CLINIC | Age: 49
End: 2024-11-26

## 2024-11-26 NOTE — TELEPHONE ENCOUNTER
Please contact patient. She has FMLA forms that are in process with the forms department - this has not been completed yet. She requested time off this week from work, hence the short term work note. Please let me know if anything additional is needed. ThanksKennedy

## 2024-11-26 NOTE — TELEPHONE ENCOUNTER
Responded to pt's [employer] message wherein pt is requesting FMLA extension until 1/6/2025 as she discussed w/ PCP.   Pt states forms completed incorrectly- through 11/22 only.    Message routed to Dr Mesa for review/ response.

## 2024-11-27 ENCOUNTER — TELEPHONE (OUTPATIENT)
Facility: CLINIC | Age: 49
End: 2024-11-27

## 2024-11-27 ENCOUNTER — TELEMEDICINE (OUTPATIENT)
Dept: SURGERY | Facility: CLINIC | Age: 49
End: 2024-11-27
Payer: COMMERCIAL

## 2024-11-27 VITALS — WEIGHT: 243 LBS | HEIGHT: 63 IN | BODY MASS INDEX: 43.05 KG/M2

## 2024-11-27 DIAGNOSIS — E88.819 INSULIN RESISTANCE: ICD-10-CM

## 2024-11-27 DIAGNOSIS — E78.5 DYSLIPIDEMIA: ICD-10-CM

## 2024-11-27 DIAGNOSIS — E66.01 MORBID OBESITY WITH BMI OF 40.0-44.9, ADULT (HCC): ICD-10-CM

## 2024-11-27 DIAGNOSIS — R63.2 BINGE EATING: ICD-10-CM

## 2024-11-27 DIAGNOSIS — K76.0 FATTY LIVER: ICD-10-CM

## 2024-11-27 DIAGNOSIS — I10 ESSENTIAL HYPERTENSION: Primary | ICD-10-CM

## 2024-11-27 PROCEDURE — 99213 OFFICE O/P EST LOW 20 MIN: CPT | Performed by: INTERNAL MEDICINE

## 2024-11-27 NOTE — TELEPHONE ENCOUNTER
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    [employer] message sent to pt to provide an employer form to Forms dept- fee has been paid and RPI signed.  7-10 business day TAT from when they receive the correct form. 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    Message routed to Dr Mesa to document also what time period for FMLA leave has been agreed to as it appears pt believes she is to be off work until 1/6/2025.

## 2024-11-27 NOTE — TELEPHONE ENCOUNTER
Dr munoz (elizabeth franco) called for information on patient Family Medical Leave Act, patient told dr.office we were working on her forms. Nurse advised we never received Family Medical Leave Act forms in our dept. Only Form Completion Request and Release of Information were received. Patient said she should've been covered since her forms in may, nurse advised she only had leave from 5/10-5/28, nurse advised she will let patient know she needs new forms. Will update rep. On status.     See patient message from 11/26/24

## 2024-11-27 NOTE — PROGRESS NOTES
[facility], [place]  [address]  [facility] [address]  Dept: [phone]     Virtual video Check-In    Jaye Abebe verbally consents to a Virtual/Telephone Check-In visit on 24.  Patient has been referred to the [facility] website at [email or web address] to review the yearly Consent to Treat document.    Patient understands and accepts financial responsibility for any deductible, co-insurance and/or co-pays associated with this service.    Duration of the service: 22 minutes      Video visit        Patient:  Jaye Abebe  :      1975  MRN:      [id number]    Chief Complaint:    Chief Complaint   Patient presents with    Follow - Up     Non surgical weight loss       SUBJECTIVE     History of Present Illness:  Jaye is being seen today for a follow-up for non surgical weight loss.     Past Medical History:   Past Medical History:    Bilateral carpal tunnel syndrome    Current moderate episode of major depressive disorder without prior episode (HCC)    Essential hypertension    Focal nodular hyperplasia of liver    Generalized anxiety disorder    Hyperlipidemia    Irritable bowel syndrome with diarrhea    PTSD (post-traumatic stress disorder)    Severe persistent asthma without complication (HCC)        Comorbidities:  Back pain-Improvement?  no, Joint pain-Improvement?  yes, GERD-Improvement?  no, Hypertension-Improvement?  yes, Hyperlipidemia-Improvement?  yes, HELGA-Improvement?  yes, Snoring-Improvement?  yes and Sleep apnea-Improvement?  yes    OBJECTIVE     Vitals: Ht 5' 3\" (1.6 m)   Wt 243 lb (110.2 kg)   BMI 43.05 kg/m²     Initial weight loss: -09   Total weight loss: -43   Start weight: 286    Wt Readings from Last 3 Encounters:   24 243 lb (110.2 kg)   24 249 lb (112.9 kg)   24 243 lb (110.2 kg)       Patient Medications:    Current Outpatient Medications   Medication Sig Dispense Refill     fluticasone-salmeterol 250-50 MCG/ACT Inhalation Aerosol Powder, Breath Activated Inhale 1 puff into the lungs 2 (two) times daily. 60 each 3    tiotropium (SPIRIVA HANDIHALER) 18 MCG Inhalation Cap Inhale 1 capsule (18 mcg total) into the lungs daily. 90 capsule 3    calcium carbonate 500 MG Oral Chew Tab Chew 1 tablet (500 mg total) by mouth as needed for Heartburn.      CUSTOM MEDICATION Semaglutide/ cyanocobalamin    1 mg -   Concentration: 5 mg/ 0.5 mL   Amount: 1 ML vial   Instructions: Injection 0.2 mL/ 20 units sq weekly 1 each 5    Ergocalciferol (VITAMIN D OR) Take by mouth.      albuterol 108 (90 Base) MCG/ACT Inhalation Aero Soln Inhale 2 puffs into the lungs every 6 (six) hours as needed for Wheezing or Shortness of Breath. 3 each 3    Nystatin 679309 UNIT/GM External Powder Apply 1 Application  topically 4 (four) times daily. Apply to affected areas 60 g 1    topiramate 25 MG Oral Tab Take 1 tablet (25 mg total) by mouth 2 (two) times daily. 180 tablet 1    lisinopril 10 MG Oral Tab Take 1 tablet (10 mg total) by mouth daily. 90 tablet 3    levonorgestrel 20 MCG/24HR Intrauterine IUD Use as directed  Mirena Lot [id number]   11-10       Allergies:  Patient has no known allergies.     Social History:    Social History     Socioeconomic History    Marital status:      Spouse name: Not on file    Number of children: Not on file    Years of education: Not on file    Highest education level: Not on file   Occupational History    Not on file   Tobacco Use    Smoking status: Every Day     Current packs/day: 0.50     Average packs/day: 0.5 packs/day for 35.9 years (18.0 ttl pk-yrs)     Types: Cigarettes     Start date: 1989    Smokeless tobacco: Never   Vaping Use    Vaping status: Never Used   Substance and Sexual Activity    Alcohol use: Yes     Comment: Occasional - once a month    Drug use: Yes     Types: Cannabis     Comment: has a medical card     Sexual activity: Yes     Birth control/protection:  Mirena     Comment: 03/2018   Other Topics Concern     Service Not Asked    Blood Transfusions Not Asked    Caffeine Concern Yes     Comment: 1 cup daily soda/coffee    Occupational Exposure Not Asked    Hobby Hazards Not Asked    Sleep Concern Not Asked    Stress Concern Not Asked    Weight Concern Not Asked    Special Diet Not Asked    Back Care Not Asked    Exercise Not Asked    Bike Helmet Not Asked    Seat Belt Not Asked    Self-Exams Not Asked    Grew up on a farm Not Asked    History of tanning Not Asked    Outdoor occupation Not Asked    Pt has a pacemaker No    Pt has a defibrillator No    Breast feeding No    Reaction to local anesthetic No   Social History Narrative    Relationships:  - Danny*    Children: Darin* (adult son, history of bipolar), Colin and Ancelmo (twins - 15M), Elsie (teacher)    Pets: Dog    School: N/A    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     Origin:    Interests:      Spiritual:      Social Drivers of Health     Financial Resource Strain: Not on file   Food Insecurity: Not on file   Transportation Needs: Not on file   Physical Activity: Not on file   Stress: Not on file   Social Connections: Not on file   Housing Stability: Not on file     Surgical History:    Past Surgical History:   Procedure Laterality Date    Cholecystectomy  2002    Colonoscopy N/A 01/30/2018    Procedure: COLONOSCOPY;  Surgeon: Timbo Sue MD;  Location: [facility] ENDOSCOPY    Colonoscopy N/A 03/30/2021    Procedure: COLONOSCOPY;  Surgeon: Timbo Sue MD;  Location: [facility] ENDOSCOPY    Cyst aspiration left      Knee arthroscopy  1994     Family History:    Family History   Problem Relation Age of Onset    Depression Mother     No Known Problems Father         Not in contact    Cancer Sister 18        Hodgkin's Lymphoma    No Known Problems Sister     Breast Cancer Maternal Grandmother 50    Dementia Maternal Grandfather     No Known Problems Paternal Grandmother      Diabetes Paternal Grandfather     Colon Cancer Paternal Uncle         50s    Schizophrenia Maternal Aunt     Mental Disorder Maternal Cousin        Food Journal  Reviewed and Discussed:       Patient has a Food Journal?: yes   Patient is reading nutrition labels?  yes  Average Caloric Intake:     Average CHO Intake: 110  Is patient exercising? yes  Type of exercise? ADL's    Eating Habits  Patient states the following:  Eats 2 meal(s) per day  Length of time it takes to consume a meal:  20  # of snacks per day: 1 Type of snacks:  junk  Amount of soda consumption per day:    Amount of water (in ounces) per day:  64  Drinking between meals only:  yes  Toughest challenge:  exercise    Nutritional Goals  Limit carbohydrates to 100 gms per day, Eat 100-200 calories within 1 hour of waking  and Eat 3-4 cups of fresh fruits or vegetables daily    Behavior Modifications Reviewed and Discussed  Eat breakfast, Eat 3 meals per day, Plan meals in advance, Read nutrition labels, Drink 64 oz of water per day, Maintain a daily food journal, No drinking 30 minutes before or after meals, Utlize portion control strategies to reduce calorie intake, Identify triggers for eating and manage cues and Eat slowly and take 20 to 30 minutes to complete each meal    Exercise Goals Reviewed and Discussed    Needs to keep moving    ROS:    Constitutional: negative  Respiratory: negative  Cardiovascular: negative  Gastrointestinal: negative  Musculoskeletal:positive for arthralgias and back pain  Neurological: negative  Behavioral/Psych: positive for stress  Endocrine: negative  All other systems were reviewed and are negative    Physical Exam:   Limited due to tele visit  Awake and alert  Speaking full sentences      ASSESSMENT     HYPERTENSION:  The patient's blood pressure has been well controlled.  she has been checking it as instructed and has remained in relatively good control.    HYPERCHOLESTEROLEMIA:  The patient states that her  cholesterol has been well controlled on her current medication.    Lab Results   Component Value Date/Time    CHOLEST 206 (H) 05/16/2024 03:15 PM     (H) 05/16/2024 03:15 PM    HDL 47 05/16/2024 03:15 PM    TRIG 249 (H) 05/16/2024 03:15 PM    VLDL 44 (H) 05/16/2024 03:15 PM       Encounter Diagnosis(ses):   Encounter Diagnoses   Name Primary?    Essential hypertension Yes    Insulin resistance     Fatty liver     Dyslipidemia     Binge eating     Morbid obesity with BMI of 40.0-44.9, adult (HCC)        PLAN     Patient is not interested in bariatric surgery. Patient desires to pursue traditional weight loss at this time.      HYPERTENSION: Blood pressure stable on the above medications. No interval change in antihypertensive medication.     DYSLIPIDEMIA: Stable on the above prescribed meal plan and medication. Liver function stable.    Lab Results   Component Value Date/Time    CHOLEST 206 (H) 05/16/2024 03:15 PM     (H) 05/16/2024 03:15 PM    HDL 47 05/16/2024 03:15 PM    TRIG 249 (H) 05/16/2024 03:15 PM    VLDL 44 (H) 05/16/2024 03:15 PM     Goals for next month:  1. Keep a food log.  2. Drink 48-64 ounces of non-caloric beverages per day. No fruit juices or regular soda.  3. Increase activity-upper body exercises, walk 10 minutes per day.  4. Increase fruit and vegetable servings to 5-6 per day.      Should come to seminar      Phentermine discontinued. Elevated HR    ekg done     Topiramate to BID      Stress/night eating syndrome: 200 mg of Zoloft    Compound semaglutide  Tolerating well  May split twice a week      Intertrigo: keep areas under skin folds dry        Diagnoses and all orders for this visit:    Essential hypertension    Insulin resistance    Fatty liver    Dyslipidemia    Binge eating    Morbid obesity with BMI of 40.0-44.9, adult (HCC)          Paco Eason MD

## 2024-12-06 ENCOUNTER — HOSPITAL ENCOUNTER (OUTPATIENT)
Dept: ULTRASOUND IMAGING | Facility: HOSPITAL | Age: 49
Discharge: HOME | End: 2024-12-06
Attending: INTERNAL MEDICINE
Payer: COMMERCIAL

## 2024-12-06 DIAGNOSIS — K76.0 METABOLIC DYSFUNCTION-ASSOCIATED STEATOTIC LIVER DISEASE (MASLD): ICD-10-CM

## 2024-12-06 PROCEDURE — 76981 USE PARENCHYMA: CPT | Performed by: INTERNAL MEDICINE

## 2024-12-06 PROCEDURE — 76705 ECHO EXAM OF ABDOMEN: CPT | Performed by: INTERNAL MEDICINE

## 2024-12-10 ENCOUNTER — PATIENT MESSAGE (OUTPATIENT)
Dept: INTERNAL MEDICINE CLINIC | Facility: CLINIC | Age: 49
End: 2024-12-10

## 2024-12-10 ENCOUNTER — OFFICE VISIT (OUTPATIENT)
Dept: OTOLARYNGOLOGY | Facility: CLINIC | Age: 49
End: 2024-12-10
Payer: COMMERCIAL

## 2024-12-10 DIAGNOSIS — D35.1 PARATHYROID ADENOMA: Primary | ICD-10-CM

## 2024-12-10 PROCEDURE — 99214 OFFICE O/P EST MOD 30 MIN: CPT | Performed by: OTOLARYNGOLOGY

## 2024-12-10 RX ORDER — SERTRALINE HYDROCHLORIDE 100 MG/1
200 TABLET, FILM COATED ORAL DAILY
COMMUNITY
Start: 2024-11-03

## 2024-12-10 NOTE — TELEPHONE ENCOUNTER
Patient is requesting FMLA letter from dates 11/18-1/6/2025.     Please advise if OK to work this letter.

## 2024-12-11 NOTE — PROGRESS NOTES
Jaye Abebe is a 49 year old female.    Chief Complaint   Patient presents with    Follow - Up     F/up parathyroid adenoma with imaging results       HISTORY OF PRESENT ILLNESS  She presents upon recommendation by her dermatologist for evaluation of neoplasm of uncertain behavior just beneath the eyebrow on the left.  This is lateral in the eye and she notes it has been increasing in size.  Has a small skin tag of the right eyelid as well.  No other signs, symptoms or complaints.  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  Here for suture removal.  No complaints or concerns     10/4/24 he presents today with a history of elevated blood calcium since 2023.  Previously around 10.5 more recently 10.6.  PTH originally around 83 and most recent blood draw 88.  Negative ultrasound of the thyroid for parathyroid adenoma.  She does complain of weakness fatigue does have a history of adrenal adenoma.  Having a work this process as well.  They have ruled out a pheochromocytoma.  Here to discuss her probable hyperparathyroidism.     12/10/24 continues to have a workup for possible pheochromocytoma one of her adrenal glands.  Has chronic fatigue needing to sleep for 2 days at a time.  Noted to have very slightly elevated calcium at 10.5 in the blood and 315 which is slightly above normal in the urine.  PTH normal.  Here to discuss further management.  All scans including cystoscopy may be CT scan and ultrasounds have been negative for parathyroid adenoma.      Social History     Socioeconomic History    Marital status:    Tobacco Use    Smoking status: Every Day     Current packs/day: 0.50     Average packs/day: 0.5 packs/day for 35.9 years (18.0 ttl pk-yrs)     Types: Cigarettes     Start date: 1989    Smokeless tobacco: Never   Vaping Use    Vaping status: Never Used   Substance and Sexual  Activity    Alcohol use: Yes     Comment: Occasional - once a month    Drug use: Yes     Types: Cannabis     Comment: has a medical card     Sexual activity: Yes     Birth control/protection: Mirena     Comment: 03/2018   Other Topics Concern    Caffeine Concern Yes     Comment: 1 cup daily soda/coffee    Pt has a pacemaker No    Pt has a defibrillator No    Breast feeding No    Reaction to local anesthetic No       Family History   Problem Relation Age of Onset    Depression Mother     No Known Problems Father         Not in contact    Cancer Sister 18        Hodgkin's Lymphoma    No Known Problems Sister     Breast Cancer Maternal Grandmother 50    Dementia Maternal Grandfather     No Known Problems Paternal Grandmother     Diabetes Paternal Grandfather     Colon Cancer Paternal Uncle         50s    Schizophrenia Maternal Aunt     Mental Disorder Maternal Cousin        Past Medical History:    Bilateral carpal tunnel syndrome    Current moderate episode of major depressive disorder without prior episode (HCC)    Essential hypertension    Focal nodular hyperplasia of liver    Generalized anxiety disorder    Hyperlipidemia    Irritable bowel syndrome with diarrhea    PTSD (post-traumatic stress disorder)    Severe persistent asthma without complication (HCC)       Past Surgical History:   Procedure Laterality Date    Cholecystectomy  2002    Colonoscopy N/A 01/30/2018    Procedure: COLONOSCOPY;  Surgeon: Timbo Sue MD;  Location: [facility] ENDOSCOPY    Colonoscopy N/A 03/30/2021    Procedure: COLONOSCOPY;  Surgeon: Timbo Sue MD;  Location: [facility] ENDOSCOPY    Cyst aspiration left      Knee arthroscopy  1994         REVIEW OF SYSTEMS    System Neg/Pos Details   Constitutional Negative Fatigue, fever and weight loss.   ENMT Negative Drooling.   Eyes Negative Blurred vision and vision changes.   Respiratory Negative Dyspnea and wheezing.   Cardio Negative Chest pain, irregular  heartbeat/palpitations and syncope.   GI Negative Abdominal pain and diarrhea.   Endocrine Negative Cold intolerance and heat intolerance.   Neuro Negative Tremors.   Psych Negative Anxiety and depression.   Integumentary Negative Frequent skin infections, pigment change and rash.   Hema/Lymph Negative Easy bleeding and easy bruising.           PHYSICAL EXAM    There were no vitals taken for this visit.       Constitutional Normal Overall appearance - Normal.   Psychiatric Normal Orientation - Oriented to time, place, person & situation. Appropriate mood and affect.   Neck Exam Normal Inspection - Normal. Palpation - Normal. Parotid gland - Normal. Thyroid gland - Normal.   Eyes Normal Conjunctiva - Right: Normal, Left: Normal. Pupil - Right: Normal, Left: Normal. Fundus - Right: Normal, Left: Normal.   Neurological Normal Memory - Normal. Cranial nerves - Cranial nerves II through XII grossly intact.   Head/Face Normal Facial features - Normal. Eyebrows - Normal. Skull - Normal.        Nasopharynx Normal External nose - Normal. Lips/teeth/gums - Normal. Tonsils - Normal. Oropharynx - Normal.   Ears Normal Inspection - Right: Normal, Left: Normal. Canal - Right: Normal, Left: Normal. TM - Right: Normal, Left: Normal.   Skin Normal Inspection - Normal.        Lymph Detail Normal Submental. Submandibular. Anterior cervical. Posterior cervical. Supraclavicular.        Nose/Mouth/Throat Normal External nose - Normal. Lips/teeth/gums - Normal. Tonsils - Normal. Oropharynx - Normal.   Nose/Mouth/Throat Normal Nares - Right: Normal Left: Normal. Septum -Normal  Turbinates - Right: Normal, Left: Normal.       Current Outpatient Medications:     sertraline 100 MG Oral Tab, Take 2 tablets (200 mg total) by mouth daily., Disp: , Rfl:     fluticasone-salmeterol 250-50 MCG/ACT Inhalation Aerosol Powder, Breath Activated, Inhale 1 puff into the lungs 2 (two) times daily., Disp: 60 each, Rfl: 3    tiotropium (SPIRIVA HANDIHALER)  18 MCG Inhalation Cap, Inhale 1 capsule (18 mcg total) into the lungs daily., Disp: 90 capsule, Rfl: 3    calcium carbonate 500 MG Oral Chew Tab, Chew 1 tablet (500 mg total) by mouth as needed for Heartburn., Disp: , Rfl:     CUSTOM MEDICATION, Semaglutide/ cyanocobalamin  1 mg -  Concentration: 5 mg/ 0.5 mL  Amount: 1 ML vial  Instructions: Injection 0.2 mL/ 20 units sq weekly, Disp: 1 each, Rfl: 5    Ergocalciferol (VITAMIN D OR), Take by mouth., Disp: , Rfl:     albuterol 108 (90 Base) MCG/ACT Inhalation Aero Soln, Inhale 2 puffs into the lungs every 6 (six) hours as needed for Wheezing or Shortness of Breath., Disp: 3 each, Rfl: 3    Nystatin [id number] UNIT/GM External Powder, Apply 1 Application  topically 4 (four) times daily. Apply to affected areas, Disp: 60 g, Rfl: 1    topiramate 25 MG Oral Tab, Take 1 tablet (25 mg total) by mouth 2 (two) times daily., Disp: 180 tablet, Rfl: 1    lisinopril 10 MG Oral Tab, Take 1 tablet (10 mg total) by mouth daily., Disp: 90 tablet, Rfl: 3    levonorgestrel 20 MCG/24HR Intrauterine IUD, Use as directed  Mirena Lot [id number]   11-10, Disp: , Rfl:   ASSESSMENT AND PLAN    1. Parathyroid adenoma  Slowly elevated urine calcium of 315 slightly elevated blood calcium of 10.5 normal PTH.  Currently being worked up for some type of adrenal gland abnormality.  I did simply recommend that she have this issue addressed before we proceed with any type of discussions regarding exploration for parathyroid adenoma.  She agrees with this plan return to see me once other adrenal issues are addressed.  We discussed that her current level of hypercalcemia is unlikely to be the cause of her severe symptoms of fatigue with need for sleeping for 2 days at a time.        This note was prepared using [employer] voice recognition dictation software. As a result errors may occur. When identified these errors have been corrected. While every attempt is made to correct errors during dictation  discrepancies may still exist    Arron Parker MD    12/10/2024    9:57 PM

## 2024-12-12 ENCOUNTER — TELEPHONE (OUTPATIENT)
Dept: INTERNAL MEDICINE CLINIC | Facility: CLINIC | Age: 49
End: 2024-12-12

## 2024-12-12 NOTE — TELEPHONE ENCOUNTER
Patient uploaded FMLA forms via [employer] for processing. Forms will be emailed and sent via interoffice mail also. No TAZ or fee was collected.

## 2024-12-12 NOTE — TELEPHONE ENCOUNTER
Received Family Medical Leave Act forms via email. Sent [employer] message to patient requesting authorization. Logged for processing.

## 2024-12-18 ENCOUNTER — OFFICE VISIT (OUTPATIENT)
Dept: SLEEP CENTER | Age: 49
End: 2024-12-18
Attending: PHYSICIAN ASSISTANT
Payer: COMMERCIAL

## 2024-12-18 DIAGNOSIS — R06.83 SNORING: ICD-10-CM

## 2024-12-18 DIAGNOSIS — R29.818 SUSPECTED SLEEP APNEA: Primary | ICD-10-CM

## 2024-12-18 PROCEDURE — 95810 POLYSOM 6/> YRS 4/> PARAM: CPT

## 2024-12-23 ENCOUNTER — ORDER TRANSCRIPTION (OUTPATIENT)
Dept: SLEEP CENTER | Age: 49
End: 2024-12-23

## 2024-12-23 DIAGNOSIS — R29.818 SUSPECTED SLEEP APNEA: Primary | ICD-10-CM

## 2024-12-23 DIAGNOSIS — R06.83 SNORING: ICD-10-CM

## 2024-12-26 DIAGNOSIS — G47.33 OSA (OBSTRUCTIVE SLEEP APNEA): Primary | ICD-10-CM

## 2024-12-31 ENCOUNTER — OFFICE VISIT (OUTPATIENT)
Facility: LOCATION | Age: 49
End: 2024-12-31
Payer: COMMERCIAL

## 2024-12-31 VITALS
DIASTOLIC BLOOD PRESSURE: 80 MMHG | BODY MASS INDEX: 42.17 KG/M2 | WEIGHT: 247 LBS | HEART RATE: 78 BPM | SYSTOLIC BLOOD PRESSURE: 126 MMHG | HEIGHT: 64 IN

## 2024-12-31 DIAGNOSIS — E55.9 VITAMIN D DEFICIENCY: ICD-10-CM

## 2024-12-31 DIAGNOSIS — R79.89 INCREASED PTH LEVEL: ICD-10-CM

## 2024-12-31 DIAGNOSIS — I10 ESSENTIAL HYPERTENSION: ICD-10-CM

## 2024-12-31 DIAGNOSIS — D35.02 ADENOMA OF LEFT ADRENAL GLAND: ICD-10-CM

## 2024-12-31 DIAGNOSIS — E53.8 VITAMIN B12 DEFICIENCY: ICD-10-CM

## 2024-12-31 DIAGNOSIS — F32.1 CURRENT MODERATE EPISODE OF MAJOR DEPRESSIVE DISORDER WITHOUT PRIOR EPISODE (HCC): ICD-10-CM

## 2024-12-31 DIAGNOSIS — K76.0 FATTY LIVER: ICD-10-CM

## 2024-12-31 DIAGNOSIS — E27.9 ADRENAL GLAND DISEASE (HCC): ICD-10-CM

## 2024-12-31 DIAGNOSIS — E78.5 DYSLIPIDEMIA: ICD-10-CM

## 2024-12-31 DIAGNOSIS — E04.1 THYROID NODULE: ICD-10-CM

## 2024-12-31 DIAGNOSIS — E83.52 HYPERCALCEMIA: ICD-10-CM

## 2024-12-31 DIAGNOSIS — E27.9 ADRENAL NODULE (HCC): ICD-10-CM

## 2024-12-31 DIAGNOSIS — G47.33 OSA ON CPAP: ICD-10-CM

## 2024-12-31 DIAGNOSIS — E21.3 HYPERPARATHYROIDISM (HCC): Primary | ICD-10-CM

## 2024-12-31 PROCEDURE — 3008F BODY MASS INDEX DOCD: CPT | Performed by: INTERNAL MEDICINE

## 2024-12-31 PROCEDURE — 3074F SYST BP LT 130 MM HG: CPT | Performed by: INTERNAL MEDICINE

## 2024-12-31 PROCEDURE — 3079F DIAST BP 80-89 MM HG: CPT | Performed by: INTERNAL MEDICINE

## 2024-12-31 PROCEDURE — 99215 OFFICE O/P EST HI 40 MIN: CPT | Performed by: INTERNAL MEDICINE

## 2024-12-31 RX ORDER — DEXAMETHASONE 0.5 MG/1
0.5 TABLET ORAL EVERY 6 HOURS
Qty: 8 TABLET | Refills: 0 | Status: SHIPPED | OUTPATIENT
Start: 2024-12-31

## 2024-12-31 NOTE — PROGRESS NOTES
Reason for Visit:    hyperparathyroidism, thyroid nodule and left adrenal adenoma.  Requesting Physician:   .Sofia Mesa MD    CHIEF COMPLAINT:    Chief Complaint   Patient presents with    Thyroid Problem     Hyperparathyroidism         HISTORY OF PRESENT ILLNESS:   Jaye Abebe is a 49 year old female who presents with primary hyperparathyroidism, hypercalcemia, left adrenal adenoma,  thyroid nodule, Obese, HTN, Low vit D and low B12 and a smoker    Was seen with her    We reviewed labs, imaging studies and US thyroid images  She had a consult with Dr Parker. Did NM scan and 4D CT. None localized parathyroid adenoma.   US thyroid showed TR4 thyroid nodule with macrocalcifications. Pt reports sister has lymphoma ?hypothyroid at age 18   No kidney stones  Drinks more water and urinates more   Wt Readings from Last 6 Encounters:   12/31/24 247 lb (112 kg)   11/27/24 243 lb (110.2 kg)   11/22/24 249 lb (112.9 kg)   11/21/24 243 lb (110.2 kg)   10/15/24 247 lb (112 kg)   10/04/24 250 lb (113.4 kg)     Lab Results   Component Value Date    A1C 5.6 05/16/2024    A1C 5.4 06/20/2023    A1C 5.6 07/02/2021      No LMP recorded. (Menstrual status: IUD - Intrauterine Device).    Had US thyroid after clinic on 9/24/2024   No suspicious nodule or mass along the posterior margin of either the right or left thyroid lobe to suggest a parathyroid adenoma by ultrasound.  Given clinical context, consider further evaluation with a nuclear medicine sestamibi scan.   . A 1.2 cm TR-4 nodule in the right lobe of the thyroid gland.  Given size, this requires continued 12 month surveillance imaging.   . Small additional 0.5 cm TR-3 nodule in the left lobe of the thyroid gland.  Given size and sonographic characteristics, such nodules are typically considered benign and require no additional follow-up.     4D CT 10/22/2024  No suspicious arterial phase enhancing or washout nodules are seen adjacent to the posterior margin of  the thyroid gland to suggest a parathyroid adenoma by 4-D neck CT technique   10/22/2024 NM scan Negative for scintigraphically discernible parathyroid adenoma     ASSESSMENT AND PLAN:  Jaye Abebe is a 49 year old female who presents with primary hyperparathyroidism, hypercalcemia, left adrenal adenoma,  thyroid nodule, Obese, HTN, Low vit D and low B12, Smoker and HR is high.     # adrenal adenoma   W/u is negative for pheo and  hyperaldosteronism   MRI in 2023 showed stable left adrenal adenoma. Was seen in 2018 on CT scan.    Salivary cortisol: 2 high and 1 normal (0.117-0.146-0.068)  24 hr urine cortisol was ordered but not done   - will do 2 day dex suppression test     #primary hyperparathyroid.   Negative imaging studies ( 4D CT, NM scan, and US)   Had  ENT consult        Thyroid nodule on US in 9/2024. Thyroid nodule has macrocalcifications   - will monitor with US in 3/2025    KAT on CPAP now       Plan  Will refer to surgery to discuss parathyroidectomy   D3 2000 unit/day     2- day dexamethasone suppression test   start taking the medication at 9 am, take every 6 hrs for 2 days ( 9 am, 3 pm, 9 pm, 3 am, 9 am, 3 pm, 9 pm and 3 am) then do blood test between 8 and 9 am   Thyroid nodule on US in 9/2024   - will monitor with US in 3/2025    PAST MEDICAL HISTORY:   Past Medical History:    Bilateral carpal tunnel syndrome    Current moderate episode of major depressive disorder without prior episode (HCC)    Essential hypertension    Focal nodular hyperplasia of liver    Generalized anxiety disorder    Hyperlipidemia    Irritable bowel syndrome with diarrhea    PTSD (post-traumatic stress disorder)    Severe persistent asthma without complication (HCC)       PAST SURGICAL HISTORY:   Past Surgical History:   Procedure Laterality Date    Cholecystectomy  2002    Colonoscopy N/A 01/30/2018    Procedure: COLONOSCOPY;  Surgeon: Timbo Sue MD;  Location: [facility] ENDOSCOPY    Colonoscopy N/A 03/30/2021     Procedure: COLONOSCOPY;  Surgeon: Timbo Sue MD;  Location: [facility] ENDOSCOPY    Cyst aspiration left      Knee arthroscopy  1994       CURRENT MEDICATIONS:     dexamethasone 0.5 MG Oral Tab Take 1 tablet (0.5 mg total) by mouth every 6 (six) hours. Take at midnight and do labs next morning at 8 am 8 tablet 0       ALLERGIES:  No Known Allergies    SOCIAL HISTORY:    Social History     Socioeconomic History    Marital status:    Tobacco Use    Smoking status: Every Day     Current packs/day: 0.50     Average packs/day: 0.5 packs/day for 36.0 years (18.0 ttl pk-yrs)     Types: Cigarettes     Start date: 1989    Smokeless tobacco: Never   Vaping Use    Vaping status: Never Used   Substance and Sexual Activity    Alcohol use: Yes     Comment: Occasional - once a month    Drug use: Yes     Types: Cannabis     Comment: has a medical card     Sexual activity: Yes     Birth control/protection: Mirena     Comment: 03/2018   Other Topics Concern    Caffeine Concern Yes     Comment: 1 cup daily soda/coffee    Pt has a pacemaker No    Pt has a defibrillator No    Breast feeding No    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  Smoking yes, 1/3 ppd   Marijuana yes   Etoh social  Drugs no  FAMILY HISTORY:   Family History   Problem Relation Age of Onset    Depression Mother     No Known Problems Father         Not in contact    Cancer Sister 18        Hodgkin's Lymphoma    No Known Problems Sister     Breast Cancer Maternal Grandmother 50    Dementia Maternal Grandfather     No Known Problems Paternal Grandmother     Diabetes Paternal Grandfather     Colon Cancer Paternal Uncle         50s    Schizophrenia Maternal Aunt     Mental Disorder Maternal Cousin             PHYSICAL EXAM:   Height: 5' 4\" (162.6 cm) (12/31 1134)  Weight: 247 lb (112 kg) (12/31 1134)  BSA (Calculated - sq m): 2.14 sq meters (12/31 1134)  Pulse: 78 (12/31 1134)  BP: 126/80 (12/31 1134)  Temp: --  Do Not Use - Resp Rate: --  SpO2: --     Body mass index is 42.4 kg/m².     CONSTITUTIONAL:  Awake and alert. Age appropriate, good hygiene not in acute distress. Well-nourished and well developed. no acute distress   PSYCH:   Orientated to time, place, person & situation, Normal mood and affect, memory intact, normal insight and judgment, cooperative  Neuro: speech is clear. Awake, alert, no aphasia, no facial asymmetry, no nuchal rigidity  EYES:  No proptosis, no ptosis, conjunctiva normal  ENT:  Normocephalic, atraumatic        DATA:     Pertinent data reviewed   Latest Reference Range & Units 08/17/24 08:57   DHEA SULFATE 25.9 - 460.2 ug/dL 100.3      Latest Reference Range & Units 08/17/24 08:57   A/G Ratio 1.0 - 2.0  1.8   PROTEIN, TOTAL 5.7 - 8.2 g/dL 7.2   Albumin 3.2 - 4.8 g/dL 4.6   VITAMIN D, 25-OH, TOTAL 30.0 - 100.0 ng/mL 27.7 (L)   RENIN ACTIVITY 0.167 - 5.380 ng/mL/hr 8.630 (H)   NORMETANEPHRINE 0.0 - 218.9 pg/mL 100.0   METANEPHRINE 0.0 - 88.0 pg/mL 37.9   Patient Fasting for CMP?  Yes   PTH INTACT 18.5 - 88.0 pg/mL 88.0   Cortisol ug/dL  ug/dL 20.5  20.4   ACTH 7.2 - 63.3 pg/mL 23.0   ALDOSTERONE 0.0 - 30.0 ng/dL 3.1      Latest Reference Range & Units 08/17/24 08:57   CREATININE 0.55 - 1.02 mg/dL 0.82   CALCIUM 8.7 - 10.4 mg/dL 10.6 (H)       (H): Data is abnormally high   Latest Reference Range & Units 08/19/24 06:30   Urine Volume 24Hr mL 1,750   CALCIUM URINE CALC 100 - 300 mg/24hr 315 (H)   Creatinine, Urine Not Estab. mg/dL 83.6   (H): Data is abnormally high   Latest Reference Range & Units 08/19/24 06:30   Creat 24h Ur 800 - 1800 mg/24 hr 1463   Dopamine 24h Ur 0 - 510 ug/24 hr 47   Dopamine Ur Undefined ug/L 27   Epineph 24h Ur 0 - 20 ug/24 hr <5   Epineph Ur Undefined ug/L <3   Norepineph 24h Ur 0 - 135 ug/24 hr 46   Norepineph Ur Undefined ug/L 26        Latest Reference Range & Units 08/17/24 08:57 08/18/24 15:20 08/25/24 07:15   Salivary Cortisol MS ug/dL  0.068 0.146   Sex Horm Bind Glob 24.6 - 122.0 nmol/L 36.7     Testost  % Free+Weak Bnd 3.0 - 18.0 % 13.0     Testost Free+Weak Bnd 0.0 - 9.5 ng/dL 3.5     Testosterone Tot LC/MS ng/dL 26.7     Vit D 1,25 di-OH 24.8 - 81.5 pg/mL 69.1        CT 2/2018 ADRENALS:   There is a 1.5 cm low-attenuation (approximately 4 Hounsfield units) left adrenal nodule. imaging characteristics compatible with a benign adenoma   MRI 3/2023 Stable left adrenal adenoma.    Latest Reference Range & Units 06/20/23 07:29 07/17/23 07:34 05/16/24 15:15 06/06/24 08:13 07/31/24 14:30   CALCIUM 8.7 - 10.4 mg/dL 10.5 (H) 10.4 (H) 11.2 (H) 11.4 (H) 10.6 (H)      Latest Reference Range & Units 05/16/24 15:15 06/06/24 08:13 07/31/24 14:30   T4,Free (Direct) 0.8 - 1.7 ng/dL 1.4     TSH 0.550 - 4.780 mIU/mL 0.525 (L)  0.785   T3 FREE 2.40 - 4.20 pg/mL 2.99     PTH INTACT 18.5 - 88.0 pg/mL  108.1 (H)       Latest Reference Range & Units 06/20/23 07:29   Alkaline Phosphatase, Bone-Specific ug/L 24.5        No results for input(s): \"TSH\", \"T4F\", \"T3F\", \"THYP\" in the last 72 hours.  No results found.      Orders Placed This Encounter   Procedures    Dexamethasone Suppression Test (Cortisol)     Orders Placed This Encounter    Dexamethasone Suppression Test (Cortisol)     Standing Status:   Future     Standing Expiration Date:   12/31/2025     Order Specific Question:   Release to patient     Answer:   Immediate    dexamethasone 0.5 MG Oral Tab     Sig: Take 1 tablet (0.5 mg total) by mouth every 6 (six) hours. Take at midnight and do labs next morning at 8 am     Dispense:  8 tablet     Refill:  0          This is a specialized patient consultation in endocrinology and required comprehensive review of prior records, as well as current evaluation, with time required for consideration of complex endocrine issues and consultation. For this visit, I personally interviewed the patient, and family member if accompanied, performed the pertinent parts of the history and physical examination. ROS included screening for appropriate  endocrine conditions.   Today's diagnosis and plan were reviewed in detail with the patient who states understanding and agrees with plan. I discussed with the patient possible diagnosis, differential diagnosis, need for work up, treatment options, alternatives and side effects.     Please see note for details about time spent which includes:   · pre-visit preparation  · reviewing records  · face to face time with the patient   · timely documentation of the encounter  · ordering medications/tests  · communication with care team  · care coordination    I appreciate the opportunity to be part of your patient's medical care and will keep you, as the referring and primary physicians, informed about the care of your patient. Please feel free to contact me should you have any questions.    The 21st Century Cures Act makes medical notes like these available to patients in the interest of transparency. Please be advised this is a medical document. Medical documents are intended to carry relevant information, facts as evident, and the clinical opinion of the practitioner. The medical note is intended as peer to peer communication and may appear blunt or direct. It is written in medical language and may contain abbreviations or verbiage that are unfamiliar.   Total 40 minutes  Adonay Zaman MD

## 2024-12-31 NOTE — PATIENT INSTRUCTIONS
Will refer to surgery Keron Dixon MD  D3 2000 unit/day     2- day dexamethasone suppression test   start taking the medication at 9 am, take every 6 hrs for 2 days ( 9 am, 3 pm, 9 pm, 3 am, 9 am, 3 pm, 9 pm and 3 am) then do blood test between 8 and 9 am   Thyroid nodule on US in 9/2024   - will monitor with US in 3/2025

## 2025-01-03 ENCOUNTER — LAB ENCOUNTER (OUTPATIENT)
Dept: LAB | Age: 50
End: 2025-01-03
Attending: INTERNAL MEDICINE
Payer: COMMERCIAL

## 2025-01-03 DIAGNOSIS — D35.02 ADENOMA OF LEFT ADRENAL GLAND: ICD-10-CM

## 2025-01-03 LAB — CORTIS SERPL-MCNC: 1.3 UG/DL

## 2025-01-03 PROCEDURE — 82533 TOTAL CORTISOL: CPT

## 2025-01-03 PROCEDURE — 36415 COLL VENOUS BLD VENIPUNCTURE: CPT

## 2025-01-13 ENCOUNTER — PATIENT MESSAGE (OUTPATIENT)
Facility: LOCATION | Age: 50
End: 2025-01-13

## 2025-01-13 ENCOUNTER — TELEPHONE (OUTPATIENT)
Dept: ADMINISTRATIVE | Age: 50
End: 2025-01-13

## 2025-01-14 NOTE — TELEPHONE ENCOUNTER
Hello,    This request has been closed by [employer].    Please see message from [employer] below.   Thank you  Leeanna     Comment:  This is a referral to an out of network provider. Please have member evaluated in network prior to referral out of network, which includes: GANGA SOLO MD; GUSTAVO POPE MD; and EDWARDO CHEN MD. If you intend to pursue this request, please provide a letter of medical necessity verifying the rarity of this member's illness that precludes treatment within the [place] network is required to support the use of an out of network or tertiary facility and will be subject to Medical Director review. It is to be noted if specialist not available in [facility], referral to [facility] should be made prior to making an alternative request. Please utilize the [email or web address] website to find an in-, submit a new referral, check claims status, and more.

## 2025-01-21 ENCOUNTER — PATIENT MESSAGE (OUTPATIENT)
Dept: INTERNAL MEDICINE CLINIC | Facility: CLINIC | Age: 50
End: 2025-01-21

## 2025-02-06 DIAGNOSIS — I10 ESSENTIAL HYPERTENSION: ICD-10-CM

## 2025-02-06 RX ORDER — TOPIRAMATE 25 MG/1
25 TABLET, FILM COATED ORAL 2 TIMES DAILY
Qty: 180 TABLET | Refills: 1 | Status: SHIPPED | OUTPATIENT
Start: 2025-02-06

## 2025-02-09 RX ORDER — LISINOPRIL 10 MG/1
10 TABLET ORAL DAILY
Qty: 90 TABLET | Refills: 3 | Status: SHIPPED | OUTPATIENT
Start: 2025-02-09

## 2025-02-09 NOTE — TELEPHONE ENCOUNTER
Refill passed per [facility] protocol.    Requested Prescriptions   Pending Prescriptions Disp Refills    LISINOPRIL 10 MG Oral Tab [Pharmacy Med Name: LISINOPRIL 10 MG TABLET] 90 tablet 1     Sig: TAKE 1 TABLET BY MOUTH EVERY DAY       Hypertension Medications Protocol Passed - 2/9/2025 12:35 PM        Passed - CMP or BMP in past 12 months        Passed - Last BP reading less than 140/90     BP Readings from Last 1 Encounters:   12/31/24 126/80               Passed - In person appointment or virtual visit in the past 12 mos or appointment in next 3 mos     Recent Outpatient Visits              1 month ago Hyperparathyroidism (HCC)    [facility] Adonay Zaman MD    Office Visit    1 month ago Suspected sleep apnea    [facility]    Office Visit    2 months ago Parathyroid adenoma    [facility] Arron Parker MD    Office Visit    2 months ago Essential hypertension    [facility] Paco Eason MD    Telemedicine    2 months ago Current moderate episode of major depressive disorder without prior episode (HCC)    [facility] Kennedy Mesa MD    Office Visit          Future Appointments         Provider Department Appt Notes    In 4 days Paco Eason MD [facility]     In 2 weeks Kennedy Mesa MD [facility] Annual physical; last annual physical 10/03/2023     KG    In 1 month ADO US [address] Ultrasound - [place] 6 month follow up    In 2 months Jordy Pedraza PA-C [facility] Follow up    In 3 months Charlee Sweeney MD [facility] - OB/GYN Annual pap, last px 3/31/23                    Passed - EGFRCR or GFRNAA > 50     GFR  Evaluation  EGFRCR: 79 , resulted on 11/2/2024          Passed - Medication is active on med list             Future Appointments         Provider Department Appt Notes    In 4 days Paco Eason MD [facility]     In 2 weeks Kennedy Mesa MD [facility] Annual physical; last annual physical 10/03/2023     KG    In 1 month ADO US [address] Ultrasound - [place] 6 month follow up    In 2 months Jordy Pedraza PA-C [facility] Follow up    In 3 months Charlee Sweeney MD [facility] - OB/GYN Annual pap, last px 3/31/23            Recent Outpatient Visits              1 month ago Hyperparathyroidism (HCC)    [facility] Adonay Zaman MD    Office Visit    1 month ago Suspected sleep apnea    [facility]    Office Visit    2 months ago Parathyroid adenoma    [facility] Arron Parker MD    Office Visit    2 months ago Essential hypertension    [facility] Paco Eason MD    Telemedicine    2 months ago Current moderate episode of major depressive disorder without prior episode (HCC)    [facility] Kennedy Mesa MD    Office Visit

## 2025-05-07 RX ORDER — HYDROCHLOROTHIAZIDE 12.5 MG/1
12.5 CAPSULE ORAL DAILY
Qty: 90 CAPSULE | Refills: 1 | OUTPATIENT
Start: 2025-05-07

## (undated) DIAGNOSIS — I10 ESSENTIAL HYPERTENSION: ICD-10-CM

## (undated) DIAGNOSIS — R00.0 TACHYCARDIA: ICD-10-CM

## (undated) DIAGNOSIS — R63.2 BINGE EATING: ICD-10-CM

## (undated) DIAGNOSIS — F43.9 STRESS: ICD-10-CM

## (undated) DIAGNOSIS — K76.9 LIVER LESION: Primary | ICD-10-CM

## (undated) DIAGNOSIS — L30.4 INTERTRIGO: ICD-10-CM

## (undated) DEVICE — Device: Brand: DEFENDO AIR/WATER/SUCTION AND BIOPSY VALVE

## (undated) DEVICE — Device: Brand: CUSTOM PROCEDURE KIT

## (undated) DEVICE — LINE MNTR ADLT SET O2 INTMD

## (undated) DEVICE — MEDI-VAC NON-CONDUCTIVE SUCTION TUBING 6MM X 1.8M (6FT.) L: Brand: CARDINAL HEALTH

## (undated) DEVICE — 35 ML SYRINGE REGULAR TIP: Brand: MONOJECT

## (undated) DEVICE — ENDOSCOPY PACK - LOWER: Brand: MEDLINE INDUSTRIES, INC.

## (undated) DEVICE — SNARE OPTMZ PLPCTM TRP

## (undated) DEVICE — STERILE LATEX POWDER-FREE SURGICAL GLOVESWITH NITRILE COATING: Brand: PROTEXIS

## (undated) DEVICE — SNARE 9MM 230CM 2.4MM EXACTO

## (undated) DEVICE — Device

## (undated) DEVICE — FORCEP RADIAL JAW 4

## (undated) NOTE — LETTER
Date: 5/23/2023    Patient Name: Suzi Green          To Whom it may concern: This letter has been written at the patient's request. The above patient was seen at the [facility] for treatment of a medical condition.     This patient should be excused from attending work on 5/23- 5/24/23    The patient may return to work on 5/25/23        Sincerely,    Sarah Oneil., TIMN

## (undated) NOTE — LETTER
AUTHORIZATION FOR SURGICAL OPERATION OR OTHER PROCEDURE    1.  I hereby authorize Dr. Thomas White, and [address] [place] [address] staff assigned to my case to perform the following operation and/or procedure at the [address] [place] [address]:    ______________________________ Patient signature:  ___________________________________________________             Relationship to Patient:             Parent    Responsible person                            Spouse  In case of minor or                     Other  _____________   Incompet

## (undated) NOTE — LETTER
ASTHMA ACTION PLAN for Danny Baez     : 1975     Date: 10/03/23  Doctor:  Oscar Lake. MD Dean  Phone for doctor or clinic: [facility] [place]oc[place]io Alan[place], [place]  [address]   [place] [id number] [phone]      ACT Score: 21    ACT Goal: 20 or greater    Call your provider if you require your rescue/quick reliever medication more than 2-3 times in a 24 hour period. If you require your rescue inhaler/medication more than 2-3 times weekly, your asthma may not be under proper control and you should seek medical attention. *Quick Relievers are Xopenex and Albuterol*    You can use the colors of a traffic light to help learn about your asthma medicines. Year Round       1. Green - Go! % of Personal Best Peak Flow   Use controller medicine. Breathing is good  No cough or wheeze  Can work and play Medicine How much to take When to take it    Medications       Sympathomimetics Instructions     fluticasone-salmeterol (ADVAIR DISKUS) 250-50 MCG/ACT Inhalation Aerosol Powder, Breath Activated    Inhale 1 puff into the lungs every 12 (twelve) hours. albuterol 108 (90 Base) MCG/ACT Inhalation Aero Soln    Inhale 2 puffs into the lungs every 6 (six) hours as needed for Wheezing or Shortness of Breath. 2. Yellow - Caution. 50-79% Personal Best Peak Flow  Use reliever medicine to keep an asthma attack from getting bad. Cough  Quick Relievers  Wheezing  Tight Chest  Wake up at night Medicine How much to take When to take it    If symptoms are not improving in 24-48 hrs, call office for further instructions  Medications       Sympathomimetics Instructions     fluticasone-salmeterol (ADVAIR DISKUS) 250-50 MCG/ACT Inhalation Aerosol Powder, Breath Activated    Inhale 1 puff into the lungs every 12 (twelve) hours.      albuterol 108 (90 Base) MCG/ACT Inhalation Aero Soln    Inhale 2 puffs into the lungs every 6 (six) hours as needed for Wheezing or Shortness of Breath. 3. Red - Stop! Danger! <50% Personal Best Peak Flow  Continue Controller Medications But ADD:   Medicine not helping  Breathing is hard and fast  Nose opens wide  Can't walk  Ribs show  Can't talk well Medicine How much to take When to take it    If your symptoms do not improve in ONE hour -  go to the emergency room or call 911 immediately! If symptoms improve, call office for appointment immediately. Albuterol inhaler 2 puffs every 20 minutes for three treatments       Don't forget:  Rinse mouth after using inhaler  Use spacer for inhaler  Remember to get your Flu vaccine every fall! [x] Asthma Action Plan reviewed with the caregiver and patient, and a copy of the plan was given to the patient/caregiver. [] Asthma Action Plan reviewed with the caregiver and patient on the phone, and copy mailed to patient/caregiver or sent via [address] Signatures:   Provider  Jackson Zaragoza.  Geeta Joseph MD Patient  Tone Harris

## (undated) NOTE — LETTER
12/7/2023          To Whom It May Concern:    Earl Conley is currently under my medical care and may not return to work at this time. Please excuse Aris Soniella for 1 weeks. She may return to work on 12/15/2023. Activity is restricted as follows: none. If you require additional information please contact our office. Sincerely,        Taryn Groves. MD Dean          Document generated by:  Adam Canada MD

## (undated) NOTE — LETTER
AUTHORIZATION FOR SURGICAL OPERATION OR OTHER PROCEDURE    1.  I hereby authorize Dr. Bob Chavis and [facility] staff assigned to my case to perform the following operation and/or procedure at the [facility]:    ________________________________ ________ A. M.  P.M.        Patient Name:  ______________________________________________________  (please print)      Patient signature:  ___________________________________________________             Relationship to Patient:           []  Parent    Respon

## (undated) NOTE — Clinical Note
Santiago Mesa,  Thank you for referring this patient. Just wanted to send a quick note as she had a lot of concerns today. She mentioned she cancelled ENT appt as parathyroid imaging was unrevealing so she did not think it was needed. I encouraged her to follow up ENT and to d/w at her appt tomorrow as it looks like she has primary hyperparathyroidism and thinking she may be surgical candidate? I was not able to answer all of her questions related to this.   She is having daily acid reflux and taking 6-8 Tums per day which I'm sure may make the hypercalcemia worse. I discussed lifestyle modifications with her.  Thank you,  Jordy

## (undated) NOTE — LETTER
Date: 5/16/2024    Patient Name: Jaye Abebe          To Whom it may concern:    This letter has been written at the patient's request. The above patient was seen at [facility] for treatment of a medical condition.    This patient should be excused from attending work through 8/1/24.        Sincerely,       Kennedy Mesa MD

## (undated) NOTE — LETTER
11/30/2020    [address] [facility]            Dear Aubree Abdalla,      Our records indicate that you are due for an appointment for a Colonoscopy in January 2021, or shortly there after, with Cristopher Muniz

## (undated) NOTE — ED AVS SNAPSHOT
Ton Rosario   MRN: [id number]    Department:  [facility] Emergency Department   Date of Visit:  1/20/2019           Disclosure     Insurance plans vary and the physician(s) referred by the ER may not be covered by your plan.  Please contact yo CARE PHYSICIAN AT ONCE OR RETURN IMMEDIATELY TO THE EMERGENCY DEPARTMENT. If you have been prescribed any medication(s), please fill your prescription right away and begin taking the medication(s) as directed.   If you believe that any of the medications

## (undated) NOTE — LETTER
12/22/2022              Rosalie Spurling        [address]        [address] [address]         Dear Frankie Pastor,      The report of the Biopsy done on 12/19/22 shows a Hemangioma with hemorrhage. This is a benign (not cancerous) growth, and requires no further treatment. If you have any questions please contact our office.                 Yours Truly,    Jenifer Lake MD  [address] [place] [address] Road  [address]  [address] [address] [phone]

## (undated) NOTE — LETTER
AUTHORIZATION FOR SURGICAL OPERATION OR OTHER PROCEDURE    1.  I hereby authorize Dr. Zak Hoffman, and [facility] staff assigned to my case to perform the following operation and/or procedure at the [facility]:    Kenalog injection to left maddy Time:  ________ A. M.  P.M.        Patient Name:  ______________________________________________________  (please print)      Patient signature:  ___________________________________________________             Relationship to Patient:           []  Parent

## (undated) NOTE — LETTER
[facility]  Administration of Anesthesia  1. Becki Mena, or _________________________________ acting on her behalf, (Patient) (Dependent/Representative) request to receive anesthesia for my pending procedure/operation/treatment.   A infections, high spinal block, spinal bleeding, seizure, cardiac arrest and death. 7. AWARENESS: I understand that it is possible (but unlikely) to have explicit memory of events from the operating room while under general anesthesia.   8. ELECTROCONVULSIV (Date) (Time)                                                                                               (Responsible person in case of minor/ unconscious pt) /Relationship    My signature below affirms that prior to the time of the procedure, I have ex

## (undated) NOTE — LETTER
ASTHMA ACTION PLAN for Haile Harrington     : 1975     Date: 2021  Provider: Luke Brown MD  Phone for doctor or clinic: [address] [facility].  [address] [facility] [address] [phone]    [facility]

## (undated) NOTE — LETTER
Date: 3/15/2023    Patient Name: Helen Kwong          To Whom it may concern: This letter has been written at the patient's request. The above patient was seen today at the [facility] for treatment of a medical condition. Please excuse patient's absence from work. Patient may return as of Friday, 3/17/23, provided she is improving and without fever.         Sincerely,    EMILIE Austin  Family Nurse Practitioner

## (undated) NOTE — LETTER
Kristopher Sa, [address] [place] [address]       02/17/23        Patient: Donny Leyden   YOB: 1975   Date of Visit: 2/17/2023       Dear  Dr. Elder Edwards MD,      Thank you for referring Donny Leyden to my practice. Please find my assessment and plan below. ASSESSMENT AND PLAN    1. Cyst of skin of eyebrow  Small cystic or possible lipoma of the lateral canthal region just beneath the brow on the left. I did recommend excising this under local anesthesia in the surgery center setting. She understands the risks of surgery to include but not be limited to postoperative pain, bleeding as well as the potential for recurrence and poor cosmesis. She accepts these risks and wishes to proceed. We will also remove the small skin tag from the right eyelid in the OR at the same time. Sincerely,   Anisa Strong. Kell Graves MD   [address]/[place] , [place]  [address] [address]    Document electronically generated by:  Anisa Strong.  Kell Graves MD

## (undated) NOTE — MR AVS SNAPSHOT
[address] at [address] [facility] [id number] [phone]               Thank you for choosing us for your health care visit with Georgina Romo DO.   We are glad to serve you and happy to provide you with this chaudhary [address]   [facility]   Phone:  [phone]         Referral Orders      Normal Orders This Visit    BARIATRICS - INTERNAL [[id number] CUSTOM]  Order #:  [id number]         **REFERRAL REQUEST**    Your physician has referred you to a Take 1 tablet (500 mg total) by mouth 2 (two) times daily.    Commonly known as:  GLUCOPHAGE           MIRENA (52 MG) 20 MCG/24HR Iud   Generic drug:  Levonorgestrel   Use as directed  Mirena Lot [id number]   11-10                Where to Get Your Medications Water is best for hydration Fast food. Eat at home when possible     Tips for increasing your physical activity – Adults who are physically active are less likely to develop some chronic diseases than adults who are inactive.      HOW TO GET STARTED: HOW

## (undated) NOTE — LETTER
Date: 12/10/2024    Patient Name: Jaye Abebe          To Whom it may concern:    This letter has been written at the patient's request. The above patient was seen at [facility] for treatment of a medical condition.    This patient should be excused from attending work 11/18/24 - 1/6/2025.       Sincerely,         Kennedy Mesa MD